# Patient Record
Sex: FEMALE | Race: WHITE | Employment: OTHER | URBAN - NONMETROPOLITAN AREA
[De-identification: names, ages, dates, MRNs, and addresses within clinical notes are randomized per-mention and may not be internally consistent; named-entity substitution may affect disease eponyms.]

---

## 2019-04-15 ENCOUNTER — OFFICE VISIT (OUTPATIENT)
Dept: PRIMARY CARE CLINIC | Age: 74
End: 2019-04-15
Payer: MEDICARE

## 2019-04-15 VITALS
WEIGHT: 141 LBS | HEART RATE: 82 BPM | BODY MASS INDEX: 25.95 KG/M2 | HEIGHT: 62 IN | OXYGEN SATURATION: 96 % | DIASTOLIC BLOOD PRESSURE: 80 MMHG | SYSTOLIC BLOOD PRESSURE: 126 MMHG

## 2019-04-15 DIAGNOSIS — Z86.14 HISTORY OF MRSA INFECTION: ICD-10-CM

## 2019-04-15 DIAGNOSIS — E78.2 MIXED HYPERLIPIDEMIA: ICD-10-CM

## 2019-04-15 DIAGNOSIS — E03.9 HYPOTHYROIDISM, UNSPECIFIED TYPE: ICD-10-CM

## 2019-04-15 DIAGNOSIS — C91.10 CLL (CHRONIC LYMPHOCYTIC LEUKEMIA) (HCC): ICD-10-CM

## 2019-04-15 DIAGNOSIS — I10 ESSENTIAL HYPERTENSION: Primary | ICD-10-CM

## 2019-04-15 PROCEDURE — 4040F PNEUMOC VAC/ADMIN/RCVD: CPT | Performed by: NURSE PRACTITIONER

## 2019-04-15 PROCEDURE — 99203 OFFICE O/P NEW LOW 30 MIN: CPT | Performed by: NURSE PRACTITIONER

## 2019-04-15 PROCEDURE — 1123F ACP DISCUSS/DSCN MKR DOCD: CPT | Performed by: NURSE PRACTITIONER

## 2019-04-15 PROCEDURE — G8427 DOCREV CUR MEDS BY ELIG CLIN: HCPCS | Performed by: NURSE PRACTITIONER

## 2019-04-15 PROCEDURE — 3017F COLORECTAL CA SCREEN DOC REV: CPT | Performed by: NURSE PRACTITIONER

## 2019-04-15 PROCEDURE — G8400 PT W/DXA NO RESULTS DOC: HCPCS | Performed by: NURSE PRACTITIONER

## 2019-04-15 PROCEDURE — 1036F TOBACCO NON-USER: CPT | Performed by: NURSE PRACTITIONER

## 2019-04-15 PROCEDURE — G8419 CALC BMI OUT NRM PARAM NOF/U: HCPCS | Performed by: NURSE PRACTITIONER

## 2019-04-15 PROCEDURE — 1090F PRES/ABSN URINE INCON ASSESS: CPT | Performed by: NURSE PRACTITIONER

## 2019-04-15 RX ORDER — LEVOTHYROXINE SODIUM 88 UG/1
88 TABLET ORAL DAILY
Qty: 90 TABLET | Refills: 3 | Status: SHIPPED | OUTPATIENT
Start: 2019-04-15 | End: 2020-04-28 | Stop reason: SDUPTHER

## 2019-04-15 RX ORDER — LEVOTHYROXINE SODIUM 88 UG/1
88 TABLET ORAL DAILY
COMMUNITY
End: 2019-04-15 | Stop reason: SDUPTHER

## 2019-04-15 RX ORDER — ROSUVASTATIN CALCIUM 5 MG/1
5 TABLET, COATED ORAL DAILY
COMMUNITY
End: 2019-04-15 | Stop reason: SDUPTHER

## 2019-04-15 RX ORDER — ROSUVASTATIN CALCIUM 5 MG/1
5 TABLET, COATED ORAL DAILY
Qty: 90 TABLET | Refills: 2 | Status: SHIPPED | OUTPATIENT
Start: 2019-04-15 | End: 2020-04-09 | Stop reason: SDUPTHER

## 2019-04-15 RX ORDER — RAMIPRIL 5 MG/1
5 CAPSULE ORAL DAILY
COMMUNITY
End: 2019-06-03 | Stop reason: SDUPTHER

## 2019-04-15 SDOH — HEALTH STABILITY: MENTAL HEALTH: HOW OFTEN DO YOU HAVE A DRINK CONTAINING ALCOHOL?: 2-4 TIMES A MONTH

## 2019-04-15 SDOH — HEALTH STABILITY: MENTAL HEALTH: HOW MANY STANDARD DRINKS CONTAINING ALCOHOL DO YOU HAVE ON A TYPICAL DAY?: 1 OR 2

## 2019-04-15 ASSESSMENT — PATIENT HEALTH QUESTIONNAIRE - PHQ9
2. FEELING DOWN, DEPRESSED OR HOPELESS: 0
1. LITTLE INTEREST OR PLEASURE IN DOING THINGS: 0
SUM OF ALL RESPONSES TO PHQ QUESTIONS 1-9: 0
SUM OF ALL RESPONSES TO PHQ9 QUESTIONS 1 & 2: 0
SUM OF ALL RESPONSES TO PHQ QUESTIONS 1-9: 0

## 2019-04-15 ASSESSMENT — ENCOUNTER SYMPTOMS
BACK PAIN: 1
SHORTNESS OF BREATH: 0
NAUSEA: 0
DIARRHEA: 0
SORE THROAT: 0
CONSTIPATION: 0
ABDOMINAL PAIN: 0
PHOTOPHOBIA: 0
COUGH: 0
VOMITING: 0

## 2019-04-15 NOTE — PROGRESS NOTES
[address]  PRIMARY CARE  [address] [facility] [address]  Dept: [phone]    Mara Titus is a 74 y.o. female who presents today as an new patient for her medical conditionsas noted below. Chief Complaint   Patient presents with    New Patient     New to area from [place], now resides in the [facility] here at [facility]. Here to get established as a new patient. HPI:     HPI  No concerns today  Back pain related to recent move   A lot of exercise, tia chi, golf, and walking  CLL - father had it also, oncologist in [place], [place]   History of MRSA like boil on nose and moved to other parts of visit. She saw an ENT and was treated continues mupirocin. Last colonoscopy was completed about 3-4 years ago  Pneumonia vaccine upto date  No shingles vaccine due to CLL      No results found for: LDLCHOLESTEROL, LDLCALC    (goal LDL is <100)   No results found for: AST, ALT, BUN  BP Readings from Last 3 Encounters:   04/15/19 126/80          (goal 120/80)    Past Medical History:   Diagnosis Date    CLL (chronic lymphocytic leukemia) ([facility] [address] 2004    Hyperlipidemia     Hypothyroidism     MRSA (methicillin resistant Staphylococcus aureus) infection       Past Surgical History:   Procedure Laterality Date    BREAST BIOPSY Right        Family History   Problem Relation Age of Onset    Dementia Mother     Other Father         CLL       Social History     Tobacco Use    Smoking status: Never Smoker    Smokeless tobacco: Never Used   Substance Use Topics    Alcohol use: Yes     Frequency: 2-4 times a month     Drinks per session: 1 or 2     Binge frequency: Never      Current Outpatient Medications   Medication Sig Dispense Refill    ramipril (ALTACE) 5 MG capsule Take 5 mg by mouth daily      mupirocin (BACTROBAN) 2 % ointment Apply 3 times daily.  1 Tube 3    rosuvastatin (CRESTOR) 5 MG tablet Take 1 tablet by mouth daily 90 tablet well-developed and well-nourished. HENT:   Head: Normocephalic and atraumatic. Right Ear: External ear normal.   Left Ear: External ear normal.   Eyes: Pupils are equal, round, and reactive to light. Conjunctivae and EOM are normal.   Neck: Normal range of motion. Neck supple. No thyromegaly present. Cardiovascular: Normal rate, regular rhythm and normal heart sounds. No carotid bruit   Pulmonary/Chest: Effort normal and breath sounds normal.   Abdominal: Soft. Bowel sounds are normal.   Musculoskeletal: Normal range of motion. She exhibits no edema. Lymphadenopathy:     She has no cervical adenopathy. Neurological: She is alert and oriented to person, place, and time. No cranial nerve deficit. Skin: Skin is warm and dry. Capillary refill takes less than 2 seconds. Psychiatric: She has a normal mood and affect. Her behavior is normal. Thought content normal.   Nursing note and vitals reviewed. Assessment:       Diagnosis Orders   1. Essential hypertension     2. Mixed hyperlipidemia  rosuvastatin (CRESTOR) 5 MG tablet   3. Hypothyroidism, unspecified type  levothyroxine (SYNTHROID) 88 MCG tablet   4. CLL (chronic lymphocytic leukemia) [facility])  Jennifer Kelley MD, Hematology/Oncology, [place]   5. History of MRSA infection  mupirocin (BACTROBAN) 2 % ointment        Plan:    Referral to Dr. Yves Sinha  Refill medications  Obtain previous records    Return in about 6 months (around 10/15/2019) for med check. Orders Placed This Encounter   Procedures   Jennifer Kelley MD, Hematology/Oncology, [place]     Referral Priority:   Routine     Referral Type:   Eval and Treat     Referral Reason:   Specialty Services Required     Referred to Provider:   Disha Cabello MD     Requested Specialty:   Hematology and Oncology     Number of Visits Requested:   1     Orders Placed This Encounter   Medications    mupirocin (BACTROBAN) 2 % ointment     Sig: Apply 3 times daily. Dispense:  1 Tube     Refill:  3    rosuvastatin (CRESTOR) 5 MG tablet     Sig: Take 1 tablet by mouth daily     Dispense:  90 tablet     Refill:  2    levothyroxine (SYNTHROID) 88 MCG tablet     Sig: Take 1 tablet by mouth Daily     Dispense:  90 tablet     Refill:  3       Patient given educationalmaterials - see patient instructions. Discussed use, benefit, and side effectsof prescribed medications. All patient questions answered. Pt voiced understanding. Reviewed health maintenance. Instructed to continue current medications, diet andexercise. Patient agreed with treatment plan. Follow up as directed.      Electronicallysigned by KIRA Gonzalez CNP on 4/15/2019 at 9:56 PM

## 2019-04-23 ENCOUNTER — OFFICE VISIT (OUTPATIENT)
Dept: PRIMARY CARE CLINIC | Age: 74
End: 2019-04-23
Payer: MEDICARE

## 2019-04-23 VITALS
BODY MASS INDEX: 26.02 KG/M2 | SYSTOLIC BLOOD PRESSURE: 146 MMHG | WEIGHT: 141.4 LBS | RESPIRATION RATE: 16 BRPM | OXYGEN SATURATION: 98 % | HEIGHT: 62 IN | HEART RATE: 81 BPM | DIASTOLIC BLOOD PRESSURE: 90 MMHG

## 2019-04-23 DIAGNOSIS — M62.838 MUSCLE SPASM: ICD-10-CM

## 2019-04-23 DIAGNOSIS — M54.6 ACUTE BILATERAL THORACIC BACK PAIN: Primary | ICD-10-CM

## 2019-04-23 PROCEDURE — G8400 PT W/DXA NO RESULTS DOC: HCPCS | Performed by: NURSE PRACTITIONER

## 2019-04-23 PROCEDURE — 1090F PRES/ABSN URINE INCON ASSESS: CPT | Performed by: NURSE PRACTITIONER

## 2019-04-23 PROCEDURE — 3017F COLORECTAL CA SCREEN DOC REV: CPT | Performed by: NURSE PRACTITIONER

## 2019-04-23 PROCEDURE — G8419 CALC BMI OUT NRM PARAM NOF/U: HCPCS | Performed by: NURSE PRACTITIONER

## 2019-04-23 PROCEDURE — G8427 DOCREV CUR MEDS BY ELIG CLIN: HCPCS | Performed by: NURSE PRACTITIONER

## 2019-04-23 PROCEDURE — 99213 OFFICE O/P EST LOW 20 MIN: CPT | Performed by: NURSE PRACTITIONER

## 2019-04-23 PROCEDURE — 1036F TOBACCO NON-USER: CPT | Performed by: NURSE PRACTITIONER

## 2019-04-23 PROCEDURE — 4040F PNEUMOC VAC/ADMIN/RCVD: CPT | Performed by: NURSE PRACTITIONER

## 2019-04-23 PROCEDURE — 1123F ACP DISCUSS/DSCN MKR DOCD: CPT | Performed by: NURSE PRACTITIONER

## 2019-04-23 ASSESSMENT — ENCOUNTER SYMPTOMS
COUGH: 0
NAUSEA: 0
DIARRHEA: 0
BACK PAIN: 1
CONSTIPATION: 0
SHORTNESS OF BREATH: 0

## 2019-04-23 NOTE — PROGRESS NOTES
is oriented to person, place, and time. She appears well-developed and well-nourished. HENT:   Head: Normocephalic and atraumatic. Neck: Normal range of motion. Neck supple. Cardiovascular: Normal rate, regular rhythm and normal heart sounds. No murmur heard. No carotid bruit   Pulmonary/Chest: Effort normal and breath sounds normal.   Abdominal: Soft. Bowel sounds are normal.   Musculoskeletal: Normal range of motion. She exhibits no edema. Thoracic back: She exhibits pain. She exhibits normal range of motion, no tenderness, no bony tenderness and no spasm. Lumbar back: She exhibits normal range of motion, no tenderness, no bony tenderness and no pain. Neurological: She is alert and oriented to person, place, and time. Skin: Skin is warm and dry. Psychiatric: She has a normal mood and affect. Her behavior is normal.   Nursing note and vitals reviewed. Assessment:       Diagnosis Orders   1. Acute bilateral thoracic back pain  External Referral To Physical Therapy   2. Muscle spasm  External Referral To Physical Therapy        Plan:     Apply heat and ice to effective area. First apply heat to effective area for 15-20 minutes then immediately afterward apply ice for 15-20 minutes 2-3x per day. Physical therapy    Blood pressure elevated today - may be due to pain and coffee. Recheck in one week. Return if symptoms worsen or fail to improve, for nurse visit 1 week for HTN. Orders Placed This Encounter   Procedures    External Referral To Physical Therapy     Referral Priority:   Routine     Referral Type:   Eval and Treat     Referral Reason:   Specialty Services Required     Requested Specialty:   Physical Therapy     Number of Visits Requested:   1     No orders of the defined types were placed in this encounter. Patient given educationalmaterials - see patient instructions. Discussed use, benefit, and side effectsof prescribed medications.   All patient questions answered. Pt voiced understanding. Reviewed health maintenance. Instructed to continue current medications, diet andexercise. Patient agreed with treatment plan. Follow up as directed.      Electronicallysigned by KIRA Darling CNP on 4/23/2019 at 10:38 AM

## 2019-04-23 NOTE — PATIENT INSTRUCTIONS
Apply heat and ice to effective area. First apply heat to effective area for 15-20 minutes then immediately afterward apply ice for 15-20 minutes 2-3x per day.      Physical therapy

## 2019-04-24 ENCOUNTER — TELEPHONE (OUTPATIENT)
Dept: ONCOLOGY | Age: 74
End: 2019-04-24

## 2019-04-24 ENCOUNTER — OFFICE VISIT (OUTPATIENT)
Dept: ONCOLOGY | Age: 74
End: 2019-04-24
Payer: MEDICARE

## 2019-04-24 ENCOUNTER — HOSPITAL ENCOUNTER (OUTPATIENT)
Age: 74
Discharge: HOME | End: 2019-04-24
Payer: MEDICARE

## 2019-04-24 VITALS
DIASTOLIC BLOOD PRESSURE: 76 MMHG | HEART RATE: 80 BPM | BODY MASS INDEX: 26.25 KG/M2 | TEMPERATURE: 98.2 F | SYSTOLIC BLOOD PRESSURE: 135 MMHG | WEIGHT: 141.2 LBS

## 2019-04-24 DIAGNOSIS — C91.10 CLL (CHRONIC LYMPHOCYTIC LEUKEMIA) (HCC): ICD-10-CM

## 2019-04-24 DIAGNOSIS — E03.9 HYPOTHYROIDISM, UNSPECIFIED TYPE: ICD-10-CM

## 2019-04-24 DIAGNOSIS — Z86.14 HISTORY OF MRSA INFECTION: ICD-10-CM

## 2019-04-24 DIAGNOSIS — E78.2 MIXED HYPERLIPIDEMIA: ICD-10-CM

## 2019-04-24 DIAGNOSIS — C91.10 CLL (CHRONIC LYMPHOCYTIC LEUKEMIA) (HCC): Primary | ICD-10-CM

## 2019-04-24 DIAGNOSIS — I10 ESSENTIAL HYPERTENSION: ICD-10-CM

## 2019-04-24 LAB
ABSOLUTE EOS #: 0 K/UL (ref 0–0.4)
ABSOLUTE IMMATURE GRANULOCYTE: ABNORMAL K/UL (ref 0–0.3)
ABSOLUTE LYMPH #: 35.1 K/UL (ref 1–4.8)
ABSOLUTE MONO #: 0.78 K/UL (ref 0.1–1.2)
ALBUMIN SERPL-MCNC: 4.6 G/DL (ref 3.5–5.2)
ALBUMIN/GLOBULIN RATIO: 1.8 (ref 1–2.5)
ALP BLD-CCNC: 89 U/L (ref 35–104)
ALT SERPL-CCNC: 11 U/L (ref 5–33)
ANION GAP SERPL CALCULATED.3IONS-SCNC: 13 MMOL/L (ref 9–17)
AST SERPL-CCNC: 26 U/L
BASOPHILS # BLD: 0 % (ref 0–2)
BASOPHILS ABSOLUTE: 0 K/UL (ref 0–0.2)
BILIRUB SERPL-MCNC: 0.52 MG/DL (ref 0.3–1.2)
BUN BLDV-MCNC: 17 MG/DL (ref 8–23)
BUN/CREAT BLD: ABNORMAL (ref 9–20)
C-REACTIVE PROTEIN: 1.4 MG/L (ref 0–5)
CALCIUM SERPL-MCNC: 9.6 MG/DL (ref 8.6–10.4)
CHLORIDE BLD-SCNC: 103 MMOL/L (ref 98–107)
CO2: 26 MMOL/L (ref 20–31)
CREAT SERPL-MCNC: 0.8 MG/DL (ref 0.5–0.9)
DIFFERENTIAL TYPE: ABNORMAL
EOSINOPHILS RELATIVE PERCENT: 0 % (ref 1–4)
FREE KAPPA/LAMBDA RATIO: 4.06 (ref 0.26–1.65)
GFR AFRICAN AMERICAN: >60 ML/MIN
GFR NON-AFRICAN AMERICAN: >60 ML/MIN
GFR SERPL CREATININE-BSD FRML MDRD: ABNORMAL ML/MIN/{1.73_M2}
GFR SERPL CREATININE-BSD FRML MDRD: ABNORMAL ML/MIN/{1.73_M2}
GLUCOSE BLD-MCNC: 142 MG/DL (ref 70–99)
HCT VFR BLD CALC: 39.4 % (ref 36–46)
HEMOGLOBIN: 13.2 G/DL (ref 12–16)
IGA: ABNORMAL MG/DL (ref 70–400)
IGG: 521 MG/DL (ref 700–1600)
IGM: <25 MG/DL (ref 40–230)
IMMATURE GRANULOCYTES: ABNORMAL %
KAPPA FREE LIGHT CHAINS QNT: 3.65 MG/DL (ref 0.37–1.94)
LACTATE DEHYDROGENASE: 229 U/L (ref 135–214)
LAMBDA FREE LIGHT CHAINS QNT: 0.9 MG/DL (ref 0.57–2.63)
LYMPHOCYTES # BLD: 90 % (ref 24–44)
MCH RBC QN AUTO: 33.2 PG (ref 26–34)
MCHC RBC AUTO-ENTMCNC: 33.4 G/DL (ref 31–37)
MCV RBC AUTO: 99.5 FL (ref 80–100)
MONOCYTES # BLD: 2 % (ref 2–11)
MORPHOLOGY: ABNORMAL
NRBC AUTOMATED: ABNORMAL PER 100 WBC
PDW BLD-RTO: 14.2 % (ref 12.5–15.4)
PLATELET # BLD: 153 K/UL (ref 140–450)
PLATELET ESTIMATE: ABNORMAL
PMV BLD AUTO: 6.9 FL (ref 6–12)
POTASSIUM SERPL-SCNC: 4.7 MMOL/L (ref 3.7–5.3)
RBC # BLD: 3.96 M/UL (ref 4–5.2)
RBC # BLD: ABNORMAL 10*6/UL
SEDIMENTATION RATE, ERYTHROCYTE: 10 MM (ref 0–20)
SEG NEUTROPHILS: 8 % (ref 36–66)
SEGMENTED NEUTROPHILS ABSOLUTE COUNT: 3.12 K/UL (ref 1.8–7.7)
SODIUM BLD-SCNC: 142 MMOL/L (ref 135–144)
TOTAL PROTEIN: 7.1 G/DL (ref 6.4–8.3)
WBC # BLD: 39 K/UL (ref 3.5–11)
WBC # BLD: ABNORMAL 10*3/UL

## 2019-04-24 PROCEDURE — 36415 COLL VENOUS BLD VENIPUNCTURE: CPT

## 2019-04-24 PROCEDURE — G8427 DOCREV CUR MEDS BY ELIG CLIN: HCPCS | Performed by: INTERNAL MEDICINE

## 2019-04-24 PROCEDURE — 85025 COMPLETE CBC W/AUTO DIFF WBC: CPT

## 2019-04-24 PROCEDURE — 1123F ACP DISCUSS/DSCN MKR DOCD: CPT | Performed by: INTERNAL MEDICINE

## 2019-04-24 PROCEDURE — 85651 RBC SED RATE NONAUTOMATED: CPT

## 2019-04-24 PROCEDURE — 80053 COMPREHEN METABOLIC PANEL: CPT

## 2019-04-24 PROCEDURE — 1090F PRES/ABSN URINE INCON ASSESS: CPT | Performed by: INTERNAL MEDICINE

## 2019-04-24 PROCEDURE — 99204 OFFICE O/P NEW MOD 45 MIN: CPT | Performed by: INTERNAL MEDICINE

## 2019-04-24 PROCEDURE — 83883 ASSAY NEPHELOMETRY NOT SPEC: CPT

## 2019-04-24 PROCEDURE — 83615 LACTATE (LD) (LDH) ENZYME: CPT

## 2019-04-24 PROCEDURE — 82232 ASSAY OF BETA-2 PROTEIN: CPT

## 2019-04-24 PROCEDURE — 1036F TOBACCO NON-USER: CPT | Performed by: INTERNAL MEDICINE

## 2019-04-24 PROCEDURE — G8400 PT W/DXA NO RESULTS DOC: HCPCS | Performed by: INTERNAL MEDICINE

## 2019-04-24 PROCEDURE — 3017F COLORECTAL CA SCREEN DOC REV: CPT | Performed by: INTERNAL MEDICINE

## 2019-04-24 PROCEDURE — G8419 CALC BMI OUT NRM PARAM NOF/U: HCPCS | Performed by: INTERNAL MEDICINE

## 2019-04-24 PROCEDURE — 86140 C-REACTIVE PROTEIN: CPT

## 2019-04-24 PROCEDURE — 86334 IMMUNOFIX E-PHORESIS SERUM: CPT

## 2019-04-24 PROCEDURE — 99201 HC NEW PT, E/M LEVEL 1: CPT | Performed by: INTERNAL MEDICINE

## 2019-04-24 PROCEDURE — 84155 ASSAY OF PROTEIN SERUM: CPT

## 2019-04-24 PROCEDURE — 82784 ASSAY IGA/IGD/IGG/IGM EACH: CPT

## 2019-04-24 PROCEDURE — 84165 PROTEIN E-PHORESIS SERUM: CPT

## 2019-04-24 PROCEDURE — 4040F PNEUMOC VAC/ADMIN/RCVD: CPT | Performed by: INTERNAL MEDICINE

## 2019-04-24 NOTE — TELEPHONE ENCOUNTER
Per Dr Giovany Gunn AVS-pt to have labs before her return visit in July to see him-pt instructed to come to  a week before her rv to get labs-pt understood

## 2019-04-24 NOTE — PROGRESS NOTES
ONCOLOGY NOTE    PCP: KIRA Gonzalez - CNP  Referring Provider: KIRA Corbett -*    VISIT DIAGNOSIS:  The primary encounter diagnosis was CLL (chronic lymphocytic leukemia) ([facility] Diagnoses of Mixed hyperlipidemia, Hypothyroidism, unspecified type, Essential hypertension, and History of MRSA infection were also pertinent to this visit. STAGING:  Cancer Staging  No matching staging information was found for the patient. Patient Active Problem List    Diagnosis Date Noted    Mixed hyperlipidemia 04/15/2019    Hypothyroidism 04/15/2019    Essential hypertension 04/15/2019    CLL (chronic lymphocytic leukemia) ([facility] 04/15/2019    History of MRSA infection 04/15/2019       SUBJECTIVE/HPI:  Patt Egan is a very pleasant 74 y.o. female who is presenting for consultation at the request of her PCP. She was diagnosed with CLL/SLL around September 2004. She was following with a medical oncologist in [place], [place]. She has been living there for many years. Recently, she moved to [facility]. Because of her relocation, she was referred to me to establish care with a new oncologist.  Up to this point, her cancer has been treated with observational therapy alone. Today, she reports feeling extremely well. She's not reporting any fevers, chills or symptoms of infection. She does have a history of MRSA infection in the sinuses. However, this was adequately treated and resolved. She denies any drenching sweats or B symptoms. Her appetite and weight are stable. Her ECOG performance status is 0. She is a very good quality of life. She reports no worrisome symptoms such as pain, bruising or bleeding, lethargy or failure to thrive. She would like to hear my recommendations for ongoing surveillance of her cancer.     PAST MEDICAL HISTORY:      Diagnosis Date    CLL (chronic lymphocytic leukemia) ([facility] 2004    Hyperlipidemia     Hypothyroidism     MRSA (methicillin resistant Staphylococcus aureus) infection        PAST SURGICAL HISTORY:      Procedure Laterality Date    BREAST BIOPSY Right        CURRENT MEDICATIONS:   Current Outpatient Medications   Medication Sig Dispense Refill    ramipril (ALTACE) 5 MG capsule Take 5 mg by mouth daily      rosuvastatin (CRESTOR) 5 MG tablet Take 1 tablet by mouth daily 90 tablet 2    levothyroxine (SYNTHROID) 88 MCG tablet Take 1 tablet by mouth Daily 90 tablet 3     No current facility-administered medications for this visit. ALLERGIES:   Allergies   Allergen Reactions    Penicillins Hives, Itching and Swelling    Epinephrine Other (See Comments)     Can tolerate lower doses, but if higher dose: will cause high heart rate. FAMILY HISTORY:       Problem Relation Age of Onset    Dementia Mother     Other Father         CLL       SOCIAL HISTORY:  Social History     Tobacco Use    Smoking status: Never Smoker    Smokeless tobacco: Never Used   Substance Use Topics    Alcohol use: Yes     Frequency: 2-4 times a month     Drinks per session: 1 or 2     Binge frequency: Never    Drug use: Never       REVIEW OF SYSTEMS:   Review of Systems   All other systems reviewed and are negative. PHYSICAL EXAM:   Vitals:    04/24/19 1436   BP: 135/76   Pulse: 80   Temp: 98.2 °F (36.8 °C)        Physical Exam   Constitutional: She is oriented to person, place, and time. She appears well-developed and well-nourished. No distress. HENT:   Head: Normocephalic and atraumatic. Eyes: Pupils are equal, round, and reactive to light. Neck: Neck supple. Cardiovascular: Normal rate, regular rhythm and normal heart sounds. No murmur heard. Pulmonary/Chest: Effort normal and breath sounds normal. No stridor. No respiratory distress. She has no wheezes. Abdominal: Soft. Bowel sounds are normal. She exhibits no distension. There is no tenderness. Musculoskeletal: Normal range of motion. She exhibits no edema.    Neurological: She is alert and oriented to person, place, and time. No cranial nerve deficit. Skin: Skin is warm and dry. No rash noted. Psychiatric: She has a normal mood and affect. Her behavior is normal.   Nursing note and vitals reviewed. LABS:   No results found for this or any previous visit. IMPRESSION:     1. CLL (chronic lymphocytic leukemia) ([facility] [address]    2. Mixed hyperlipidemia    3. Hypothyroidism, unspecified type    4. Essential hypertension    5. History of MRSA infection        Patient Active Problem List   Diagnosis    Mixed hyperlipidemia    Hypothyroidism    Essential hypertension    CLL [facility]ch[facility] lymphocytic leukemia) ([facility])    History of MRSA infection       PLAN:     1. I met with the patient today for approximately 45 minutes or so face-to-face. We discussed her diagnosis, the natural history disease, the workup and treatment carried out of this point and recommendations for additional workup and management moving forward. The patient has numerous good questions. Abdomen best to answer these to her satisfaction. She was grateful for the time spent with her. 2. She was diagnosed with CLL/SLL around September 2004 by peripheral blood flow cytometry. She had asymptomatic leukocytosis with lymphocytosis. Over the course of the last 15 years or so, her white count and absolute lymphocyte count have been steadily climbing. However, she is asymptomatic. She's not reporting any bulky lymph nodes. She's not having any lethargy or failure to thrive. She has no drenching sweats or B symptoms. She has no evidence of end organ damage. All taken in the context, she doesn't really meet any criteria to recommend proceeding with systemic therapy. 3. In lieu of the above, I would advise continuation with observation and watchful waiting. Patient is highly agreeable with this plan. 4. We will check labs today to establish a baseline. Our plan is to recheck a CBC in 3 months to assess for stability. Patient is in agreement with this plan. 5. She'll return to see me in about 3 months or sooner if clinically indicated.       Electronically signed by Kristine Terry MD on 4/24/2019 at 3:05 PM

## 2019-04-25 LAB
BETA-2 MICROGLOBULIN: 3.1 MG/L (ref 0.6–2.4)
IGA, LOW RANGE: 34 MG/DL (ref 70–400)

## 2019-04-26 LAB
ALBUMIN (CALCULATED): 4.4 G/DL (ref 3.2–5.2)
ALBUMIN PERCENT: 70 % (ref 45–65)
ALPHA 1 PERCENT: 3 % (ref 3–6)
ALPHA 2 PERCENT: 11 % (ref 6–13)
ALPHA-1-GLOBULIN: 0.2 G/DL (ref 0.1–0.4)
ALPHA-2-GLOBULIN: 0.7 G/DL (ref 0.5–0.9)
BETA GLOBULIN: 0.6 G/DL (ref 0.5–1.1)
BETA PERCENT: 10 % (ref 11–19)
GAMMA GLOBULIN %: 6 % (ref 9–20)
GAMMA GLOBULIN: 0.4 G/DL (ref 0.5–1.5)
PATHOLOGIST: ABNORMAL
PATHOLOGIST: NORMAL
PROTEIN ELECTROPHORESIS, SERUM: ABNORMAL
SERUM IFX INTERP: NORMAL
TOTAL PROT. SUM,%: 100 % (ref 98–102)
TOTAL PROT. SUM: 6.3 G/DL (ref 6.3–8.2)
TOTAL PROTEIN: 6.3 G/DL (ref 6.4–8.3)

## 2019-04-30 ENCOUNTER — NURSE ONLY (OUTPATIENT)
Dept: PRIMARY CARE CLINIC | Age: 74
End: 2019-04-30

## 2019-04-30 VITALS
OXYGEN SATURATION: 95 % | WEIGHT: 141.4 LBS | HEIGHT: 62 IN | SYSTOLIC BLOOD PRESSURE: 130 MMHG | RESPIRATION RATE: 16 BRPM | DIASTOLIC BLOOD PRESSURE: 78 MMHG | BODY MASS INDEX: 26.02 KG/M2 | HEART RATE: 67 BPM

## 2019-04-30 DIAGNOSIS — I10 ESSENTIAL HYPERTENSION: Primary | ICD-10-CM

## 2019-04-30 NOTE — PROGRESS NOTES
Patient is here for a 1 week blood pressure check. She denies any headaches, light-headedness, dizziness, chest pain or SOB. She is currently taking Ramipril 5 mg daily for her blood pressure. After discussed with Zach Rogers, he said that her blood pressure is good and to have a HTN follow up in 3 months. Appointment was scheduled.

## 2019-06-03 RX ORDER — RAMIPRIL 5 MG/1
5 CAPSULE ORAL DAILY
Qty: 90 CAPSULE | Refills: 3 | Status: SHIPPED | OUTPATIENT
Start: 2019-06-03 | End: 2020-06-03 | Stop reason: SDUPTHER

## 2019-06-03 RX ORDER — RAMIPRIL 1.25 MG/1
1.25 CAPSULE ORAL DAILY
Qty: 90 CAPSULE | Refills: 3 | OUTPATIENT
Start: 2019-06-03

## 2019-06-03 NOTE — TELEPHONE ENCOUNTER
Need Ramipril 5mg normally given a 90 day supply.  Need to order prescription again, needs to be sent to [facility] at [facility]

## 2019-07-09 ENCOUNTER — HOSPITAL ENCOUNTER (OUTPATIENT)
Age: 74
Discharge: HOME | End: 2019-07-09
Payer: MEDICARE

## 2019-07-09 DIAGNOSIS — C91.10 CLL (CHRONIC LYMPHOCYTIC LEUKEMIA) (HCC): ICD-10-CM

## 2019-07-09 LAB
ABSOLUTE EOS #: 0 K/UL (ref 0–0.4)
ABSOLUTE IMMATURE GRANULOCYTE: ABNORMAL K/UL (ref 0–0.3)
ABSOLUTE LYMPH #: 47.12 K/UL (ref 1–4.8)
ABSOLUTE MONO #: 0.5 K/UL (ref 0.1–0.8)
ALBUMIN SERPL-MCNC: 4.5 G/DL (ref 3.5–5.2)
ALBUMIN/GLOBULIN RATIO: 2.3 (ref 1–2.5)
ALP BLD-CCNC: 65 U/L (ref 35–104)
ALT SERPL-CCNC: 10 U/L (ref 5–33)
ANION GAP SERPL CALCULATED.3IONS-SCNC: 9 MMOL/L (ref 9–17)
AST SERPL-CCNC: 24 U/L
BASOPHILS # BLD: 0 % (ref 0–2)
BASOPHILS ABSOLUTE: 0 K/UL (ref 0–0.2)
BILIRUB SERPL-MCNC: 0.55 MG/DL (ref 0.3–1.2)
BUN BLDV-MCNC: 19 MG/DL (ref 8–23)
BUN/CREAT BLD: ABNORMAL (ref 9–20)
CALCIUM SERPL-MCNC: 9.9 MG/DL (ref 8.6–10.4)
CHLORIDE BLD-SCNC: 104 MMOL/L (ref 98–107)
CO2: 29 MMOL/L (ref 20–31)
CREAT SERPL-MCNC: 0.84 MG/DL (ref 0.5–0.9)
DIFFERENTIAL TYPE: ABNORMAL
EOSINOPHILS RELATIVE PERCENT: 0 % (ref 1–4)
GFR AFRICAN AMERICAN: >60 ML/MIN
GFR NON-AFRICAN AMERICAN: >60 ML/MIN
GFR SERPL CREATININE-BSD FRML MDRD: ABNORMAL ML/MIN/{1.73_M2}
GFR SERPL CREATININE-BSD FRML MDRD: ABNORMAL ML/MIN/{1.73_M2}
GLUCOSE BLD-MCNC: 74 MG/DL (ref 70–99)
HCT VFR BLD CALC: 41.7 % (ref 36–46)
HEMOGLOBIN: 14 G/DL (ref 12–16)
IMMATURE GRANULOCYTES: ABNORMAL %
LACTATE DEHYDROGENASE: 231 U/L (ref 135–214)
LYMPHOCYTES # BLD: 95 % (ref 24–44)
MCH RBC QN AUTO: 33.8 PG (ref 26–34)
MCHC RBC AUTO-ENTMCNC: 33.5 G/DL (ref 31–37)
MCV RBC AUTO: 100.9 FL (ref 80–100)
MONOCYTES # BLD: 1 % (ref 1–7)
MORPHOLOGY: ABNORMAL
NRBC AUTOMATED: ABNORMAL PER 100 WBC
PDW BLD-RTO: 14.6 % (ref 12.5–15.4)
PLATELET # BLD: 123 K/UL (ref 140–450)
PLATELET ESTIMATE: ABNORMAL
PMV BLD AUTO: 7.2 FL (ref 6–12)
POTASSIUM SERPL-SCNC: 5.9 MMOL/L (ref 3.7–5.3)
RBC # BLD: 4.14 M/UL (ref 4–5.2)
RBC # BLD: ABNORMAL 10*6/UL
SEG NEUTROPHILS: 4 % (ref 36–66)
SEGMENTED NEUTROPHILS ABSOLUTE COUNT: 1.98 K/UL (ref 1.8–7.7)
SODIUM BLD-SCNC: 142 MMOL/L (ref 135–144)
TOTAL PROTEIN: 6.5 G/DL (ref 6.4–8.3)
WBC # BLD: 49.6 K/UL (ref 3.5–11)
WBC # BLD: ABNORMAL 10*3/UL

## 2019-07-09 PROCEDURE — 85025 COMPLETE CBC W/AUTO DIFF WBC: CPT

## 2019-07-09 PROCEDURE — 83615 LACTATE (LD) (LDH) ENZYME: CPT

## 2019-07-09 PROCEDURE — 36415 COLL VENOUS BLD VENIPUNCTURE: CPT

## 2019-07-09 PROCEDURE — 80053 COMPREHEN METABOLIC PANEL: CPT

## 2019-07-17 ENCOUNTER — OFFICE VISIT (OUTPATIENT)
Dept: ONCOLOGY | Age: 74
End: 2019-07-17
Payer: MEDICARE

## 2019-07-17 VITALS
SYSTOLIC BLOOD PRESSURE: 147 MMHG | BODY MASS INDEX: 26.02 KG/M2 | RESPIRATION RATE: 16 BRPM | TEMPERATURE: 97.7 F | HEIGHT: 62 IN | HEART RATE: 79 BPM | DIASTOLIC BLOOD PRESSURE: 82 MMHG | WEIGHT: 141.4 LBS

## 2019-07-17 DIAGNOSIS — D69.6 THROMBOCYTOPENIA (HCC): ICD-10-CM

## 2019-07-17 DIAGNOSIS — C91.10 CLL (CHRONIC LYMPHOCYTIC LEUKEMIA) (HCC): Primary | ICD-10-CM

## 2019-07-17 PROCEDURE — 1123F ACP DISCUSS/DSCN MKR DOCD: CPT | Performed by: INTERNAL MEDICINE

## 2019-07-17 PROCEDURE — 99211 OFF/OP EST MAY X REQ PHY/QHP: CPT | Performed by: INTERNAL MEDICINE

## 2019-07-17 PROCEDURE — 4040F PNEUMOC VAC/ADMIN/RCVD: CPT | Performed by: INTERNAL MEDICINE

## 2019-07-17 PROCEDURE — 1036F TOBACCO NON-USER: CPT | Performed by: INTERNAL MEDICINE

## 2019-07-17 PROCEDURE — G8427 DOCREV CUR MEDS BY ELIG CLIN: HCPCS | Performed by: INTERNAL MEDICINE

## 2019-07-17 PROCEDURE — 99214 OFFICE O/P EST MOD 30 MIN: CPT | Performed by: INTERNAL MEDICINE

## 2019-07-17 PROCEDURE — G8419 CALC BMI OUT NRM PARAM NOF/U: HCPCS | Performed by: INTERNAL MEDICINE

## 2019-07-17 PROCEDURE — 1090F PRES/ABSN URINE INCON ASSESS: CPT | Performed by: INTERNAL MEDICINE

## 2019-07-17 PROCEDURE — G8400 PT W/DXA NO RESULTS DOC: HCPCS | Performed by: INTERNAL MEDICINE

## 2019-09-25 ENCOUNTER — TELEPHONE (OUTPATIENT)
Dept: PRIMARY CARE CLINIC | Age: 74
End: 2019-09-25

## 2019-09-25 DIAGNOSIS — C91.10 CLL (CHRONIC LYMPHOCYTIC LEUKEMIA) (HCC): Primary | ICD-10-CM

## 2019-09-25 DIAGNOSIS — E78.2 MIXED HYPERLIPIDEMIA: ICD-10-CM

## 2019-09-25 DIAGNOSIS — Z12.31 ENCOUNTER FOR SCREENING MAMMOGRAM FOR BREAST CANCER: ICD-10-CM

## 2019-09-25 DIAGNOSIS — E03.9 HYPOTHYROIDISM, UNSPECIFIED TYPE: ICD-10-CM

## 2019-09-25 DIAGNOSIS — I10 ESSENTIAL HYPERTENSION: ICD-10-CM

## 2019-09-25 NOTE — TELEPHONE ENCOUNTER
Orders entered for CBC with diff., fasting CMP, fasting lipids, and TSH with reflex. Also order for mammogram. Dr. Oliva Carrion may want additional testing. Please advise.

## 2019-10-02 ENCOUNTER — HOSPITAL ENCOUNTER (OUTPATIENT)
Age: 74
Discharge: HOME | End: 2019-10-02
Payer: MEDICARE

## 2019-10-02 DIAGNOSIS — E78.2 MIXED HYPERLIPIDEMIA: ICD-10-CM

## 2019-10-02 DIAGNOSIS — E03.9 HYPOTHYROIDISM, UNSPECIFIED TYPE: ICD-10-CM

## 2019-10-02 DIAGNOSIS — I10 ESSENTIAL HYPERTENSION: ICD-10-CM

## 2019-10-02 DIAGNOSIS — C91.10 CLL (CHRONIC LYMPHOCYTIC LEUKEMIA) (HCC): ICD-10-CM

## 2019-10-02 LAB
ABSOLUTE EOS #: 0 K/UL (ref 0–0.4)
ABSOLUTE EOS #: 0 K/UL (ref 0–0.4)
ABSOLUTE IMMATURE GRANULOCYTE: ABNORMAL K/UL (ref 0–0.3)
ABSOLUTE IMMATURE GRANULOCYTE: ABNORMAL K/UL (ref 0–0.3)
ABSOLUTE LYMPH #: 38.22 K/UL (ref 1–4.8)
ABSOLUTE LYMPH #: 38.22 K/UL (ref 1–4.8)
ABSOLUTE MONO #: 0.82 K/UL (ref 0.1–1.2)
ABSOLUTE MONO #: 0.82 K/UL (ref 0.1–1.2)
ABSOLUTE RETIC #: 0.06 M/UL (ref 0.03–0.08)
ALBUMIN SERPL-MCNC: 4.8 G/DL (ref 3.5–5.2)
ALBUMIN/GLOBULIN RATIO: 2.8 (ref 1–2.5)
ALP BLD-CCNC: 70 U/L (ref 35–104)
ALT SERPL-CCNC: 12 U/L (ref 5–33)
ANION GAP SERPL CALCULATED.3IONS-SCNC: 8 MMOL/L (ref 9–17)
AST SERPL-CCNC: 29 U/L
BASOPHILS # BLD: 0 % (ref 0–2)
BASOPHILS # BLD: 0 % (ref 0–2)
BASOPHILS ABSOLUTE: 0 K/UL (ref 0–0.2)
BASOPHILS ABSOLUTE: 0 K/UL (ref 0–0.2)
BILIRUB SERPL-MCNC: 0.81 MG/DL (ref 0.3–1.2)
BUN BLDV-MCNC: 15 MG/DL (ref 8–23)
BUN/CREAT BLD: ABNORMAL (ref 9–20)
CALCIUM SERPL-MCNC: 9.7 MG/DL (ref 8.6–10.4)
CHLORIDE BLD-SCNC: 106 MMOL/L (ref 98–107)
CHOLESTEROL, FASTING: 180 MG/DL
CHOLESTEROL/HDL RATIO: 3
CO2: 29 MMOL/L (ref 20–31)
CREAT SERPL-MCNC: 0.76 MG/DL (ref 0.5–0.9)
DIFFERENTIAL TYPE: ABNORMAL
DIFFERENTIAL TYPE: ABNORMAL
EOSINOPHILS RELATIVE PERCENT: 0 % (ref 1–4)
EOSINOPHILS RELATIVE PERCENT: 0 % (ref 1–4)
FERRITIN: 82 UG/L (ref 13–150)
FOLATE: 15.2 NG/ML
FREE KAPPA/LAMBDA RATIO: 6.68 (ref 0.26–1.65)
GFR AFRICAN AMERICAN: >60 ML/MIN
GFR NON-AFRICAN AMERICAN: >60 ML/MIN
GFR SERPL CREATININE-BSD FRML MDRD: ABNORMAL ML/MIN/{1.73_M2}
GFR SERPL CREATININE-BSD FRML MDRD: ABNORMAL ML/MIN/{1.73_M2}
GLUCOSE FASTING: 107 MG/DL (ref 70–99)
HAPTOGLOBIN: 106 MG/DL (ref 30–200)
HCT VFR BLD CALC: 41.3 % (ref 36–46)
HCT VFR BLD CALC: 41.3 % (ref 36–46)
HDLC SERPL-MCNC: 60 MG/DL
HEMOGLOBIN: 13.3 G/DL (ref 12–16)
HEMOGLOBIN: 13.3 G/DL (ref 12–16)
IGA, LOW RANGE: 26 MG/DL (ref 70–400)
IGA: ABNORMAL MG/DL (ref 70–400)
IGG: 466 MG/DL (ref 700–1600)
IGM: <25 MG/DL (ref 40–230)
IMMATURE GRANULOCYTES: ABNORMAL %
IMMATURE GRANULOCYTES: ABNORMAL %
IMMATURE RETIC FRACT: 8.9 % (ref 2.7–18.3)
IRON SATURATION: 34 % (ref 20–55)
IRON: 98 UG/DL (ref 37–145)
KAPPA FREE LIGHT CHAINS QNT: 4.01 MG/DL (ref 0.37–1.94)
LACTATE DEHYDROGENASE: 235 U/L (ref 135–214)
LAMBDA FREE LIGHT CHAINS QNT: 0.6 MG/DL (ref 0.57–2.63)
LDL CHOLESTEROL: 105 MG/DL (ref 0–130)
LYMPHOCYTES # BLD: 93 % (ref 24–44)
LYMPHOCYTES # BLD: 93 % (ref 24–44)
MCH RBC QN AUTO: 32.3 PG (ref 26–34)
MCH RBC QN AUTO: 32.3 PG (ref 26–34)
MCHC RBC AUTO-ENTMCNC: 32.3 G/DL (ref 31–37)
MCHC RBC AUTO-ENTMCNC: 32.3 G/DL (ref 31–37)
MCV RBC AUTO: 100 FL (ref 80–100)
MCV RBC AUTO: 100 FL (ref 80–100)
MONOCYTES # BLD: 2 % (ref 2–11)
MONOCYTES # BLD: 2 % (ref 2–11)
MORPHOLOGY: ABNORMAL
MORPHOLOGY: ABNORMAL
NRBC AUTOMATED: ABNORMAL PER 100 WBC
NRBC AUTOMATED: ABNORMAL PER 100 WBC
PDW BLD-RTO: 14.7 % (ref 12.5–15.4)
PDW BLD-RTO: 14.7 % (ref 12.5–15.4)
PLATELET # BLD: 115 K/UL (ref 140–450)
PLATELET # BLD: 115 K/UL (ref 140–450)
PLATELET ESTIMATE: ABNORMAL
PLATELET ESTIMATE: ABNORMAL
PMV BLD AUTO: 7.3 FL (ref 6–12)
PMV BLD AUTO: 7.3 FL (ref 6–12)
POTASSIUM SERPL-SCNC: 4.9 MMOL/L (ref 3.7–5.3)
RBC # BLD: 4.13 M/UL (ref 4–5.2)
RBC # BLD: 4.13 M/UL (ref 4–5.2)
RBC # BLD: ABNORMAL 10*6/UL
RBC # BLD: ABNORMAL 10*6/UL
RETIC %: 1.4 % (ref 0.5–1.9)
RETIC HEMOGLOBIN: 36.6 PG (ref 28.2–35.7)
SEG NEUTROPHILS: 5 % (ref 36–66)
SEG NEUTROPHILS: 5 % (ref 36–66)
SEGMENTED NEUTROPHILS ABSOLUTE COUNT: 2.06 K/UL (ref 1.8–7.7)
SEGMENTED NEUTROPHILS ABSOLUTE COUNT: 2.06 K/UL (ref 1.8–7.7)
SODIUM BLD-SCNC: 143 MMOL/L (ref 135–144)
TOTAL IRON BINDING CAPACITY: 285 UG/DL (ref 250–450)
TOTAL PROTEIN: 6.5 G/DL (ref 6.4–8.3)
TRIGLYCERIDE, FASTING: 73 MG/DL
TSH SERPL DL<=0.05 MIU/L-ACNC: 4.45 MIU/L (ref 0.3–5)
UNSATURATED IRON BINDING CAPACITY: 187 UG/DL (ref 112–347)
VITAMIN B-12: 499 PG/ML (ref 232–1245)
VLDLC SERPL CALC-MCNC: NORMAL MG/DL (ref 1–30)
WBC # BLD: 41.1 K/UL (ref 3.5–11)
WBC # BLD: 41.1 K/UL (ref 3.5–11)
WBC # BLD: ABNORMAL 10*3/UL
WBC # BLD: ABNORMAL 10*3/UL

## 2019-10-02 PROCEDURE — 83540 ASSAY OF IRON: CPT

## 2019-10-02 PROCEDURE — 84165 PROTEIN E-PHORESIS SERUM: CPT

## 2019-10-02 PROCEDURE — 82746 ASSAY OF FOLIC ACID SERUM: CPT

## 2019-10-02 PROCEDURE — 83550 IRON BINDING TEST: CPT

## 2019-10-02 PROCEDURE — 85045 AUTOMATED RETICULOCYTE COUNT: CPT

## 2019-10-02 PROCEDURE — 84155 ASSAY OF PROTEIN SERUM: CPT

## 2019-10-02 PROCEDURE — 80053 COMPREHEN METABOLIC PANEL: CPT

## 2019-10-02 PROCEDURE — 82784 ASSAY IGA/IGD/IGG/IGM EACH: CPT

## 2019-10-02 PROCEDURE — 85025 COMPLETE CBC W/AUTO DIFF WBC: CPT

## 2019-10-02 PROCEDURE — 83883 ASSAY NEPHELOMETRY NOT SPEC: CPT

## 2019-10-02 PROCEDURE — 83010 ASSAY OF HAPTOGLOBIN QUANT: CPT

## 2019-10-02 PROCEDURE — 82232 ASSAY OF BETA-2 PROTEIN: CPT

## 2019-10-02 PROCEDURE — 84443 ASSAY THYROID STIM HORMONE: CPT

## 2019-10-02 PROCEDURE — 86334 IMMUNOFIX E-PHORESIS SERUM: CPT

## 2019-10-02 PROCEDURE — 36415 COLL VENOUS BLD VENIPUNCTURE: CPT

## 2019-10-02 PROCEDURE — 80061 LIPID PANEL: CPT

## 2019-10-02 PROCEDURE — 83615 LACTATE (LD) (LDH) ENZYME: CPT

## 2019-10-02 PROCEDURE — 82607 VITAMIN B-12: CPT

## 2019-10-02 PROCEDURE — 82728 ASSAY OF FERRITIN: CPT

## 2019-10-03 ENCOUNTER — HOSPITAL ENCOUNTER (OUTPATIENT)
Dept: MAMMOGRAPHY | Age: 74
Discharge: HOME | End: 2019-10-05
Payer: MEDICARE

## 2019-10-03 DIAGNOSIS — Z12.31 ENCOUNTER FOR SCREENING MAMMOGRAM FOR BREAST CANCER: ICD-10-CM

## 2019-10-03 LAB — BETA-2 MICROGLOBULIN: 2.5 MG/L (ref 0.6–2.4)

## 2019-10-03 PROCEDURE — 77063 BREAST TOMOSYNTHESIS BI: CPT

## 2019-10-04 LAB
ALBUMIN (CALCULATED): 4.7 G/DL (ref 3.2–5.2)
ALBUMIN PERCENT: 75 % (ref 45–65)
ALPHA 1 PERCENT: 2 % (ref 3–6)
ALPHA 2 PERCENT: 10 % (ref 6–13)
ALPHA-1-GLOBULIN: 0.1 G/DL (ref 0.1–0.4)
ALPHA-2-GLOBULIN: 0.6 G/DL (ref 0.5–0.9)
BETA GLOBULIN: 0.5 G/DL (ref 0.5–1.1)
BETA PERCENT: 9 % (ref 11–19)
GAMMA GLOBULIN %: 5 % (ref 9–20)
GAMMA GLOBULIN: 0.3 G/DL (ref 0.5–1.5)
PATHOLOGIST: ABNORMAL
PATHOLOGIST: NORMAL
PROTEIN ELECTROPHORESIS, SERUM: ABNORMAL
SERUM IFX INTERP: NORMAL
TOTAL PROT. SUM,%: 101 % (ref 98–102)
TOTAL PROT. SUM: 6.2 G/DL (ref 6.3–8.2)
TOTAL PROTEIN: 6.2 G/DL (ref 6.4–8.3)

## 2019-10-09 ENCOUNTER — OFFICE VISIT (OUTPATIENT)
Dept: ONCOLOGY | Age: 74
End: 2019-10-09
Payer: MEDICARE

## 2019-10-09 ENCOUNTER — TELEPHONE (OUTPATIENT)
Dept: ONCOLOGY | Age: 74
End: 2019-10-09

## 2019-10-09 VITALS
TEMPERATURE: 97.7 F | WEIGHT: 141.4 LBS | SYSTOLIC BLOOD PRESSURE: 134 MMHG | DIASTOLIC BLOOD PRESSURE: 80 MMHG | HEART RATE: 75 BPM | BODY MASS INDEX: 25.86 KG/M2

## 2019-10-09 DIAGNOSIS — D69.6 THROMBOCYTOPENIA (HCC): ICD-10-CM

## 2019-10-09 DIAGNOSIS — C91.10 CLL (CHRONIC LYMPHOCYTIC LEUKEMIA) (HCC): Primary | ICD-10-CM

## 2019-10-09 PROCEDURE — G8400 PT W/DXA NO RESULTS DOC: HCPCS | Performed by: INTERNAL MEDICINE

## 2019-10-09 PROCEDURE — G8484 FLU IMMUNIZE NO ADMIN: HCPCS | Performed by: INTERNAL MEDICINE

## 2019-10-09 PROCEDURE — G8419 CALC BMI OUT NRM PARAM NOF/U: HCPCS | Performed by: INTERNAL MEDICINE

## 2019-10-09 PROCEDURE — 99211 OFF/OP EST MAY X REQ PHY/QHP: CPT | Performed by: INTERNAL MEDICINE

## 2019-10-09 PROCEDURE — 1036F TOBACCO NON-USER: CPT | Performed by: INTERNAL MEDICINE

## 2019-10-09 PROCEDURE — 1123F ACP DISCUSS/DSCN MKR DOCD: CPT | Performed by: INTERNAL MEDICINE

## 2019-10-09 PROCEDURE — 99214 OFFICE O/P EST MOD 30 MIN: CPT | Performed by: INTERNAL MEDICINE

## 2019-10-09 PROCEDURE — 4040F PNEUMOC VAC/ADMIN/RCVD: CPT | Performed by: INTERNAL MEDICINE

## 2019-10-09 PROCEDURE — G8427 DOCREV CUR MEDS BY ELIG CLIN: HCPCS | Performed by: INTERNAL MEDICINE

## 2019-10-09 PROCEDURE — 1090F PRES/ABSN URINE INCON ASSESS: CPT | Performed by: INTERNAL MEDICINE

## 2019-10-14 ENCOUNTER — OFFICE VISIT (OUTPATIENT)
Dept: PRIMARY CARE CLINIC | Age: 74
End: 2019-10-14
Payer: MEDICARE

## 2019-10-14 VITALS
OXYGEN SATURATION: 95 % | HEIGHT: 62 IN | SYSTOLIC BLOOD PRESSURE: 132 MMHG | WEIGHT: 142.6 LBS | BODY MASS INDEX: 26.24 KG/M2 | RESPIRATION RATE: 16 BRPM | TEMPERATURE: 98 F | HEART RATE: 71 BPM | DIASTOLIC BLOOD PRESSURE: 76 MMHG

## 2019-10-14 DIAGNOSIS — Z23 ENCOUNTER FOR IMMUNIZATION: Primary | ICD-10-CM

## 2019-10-14 DIAGNOSIS — Z00.00 ROUTINE GENERAL MEDICAL EXAMINATION AT A HEALTH CARE FACILITY: ICD-10-CM

## 2019-10-14 PROCEDURE — G0008 ADMIN INFLUENZA VIRUS VAC: HCPCS | Performed by: NURSE PRACTITIONER

## 2019-10-14 PROCEDURE — 90653 IIV ADJUVANT VACCINE IM: CPT | Performed by: NURSE PRACTITIONER

## 2019-10-14 PROCEDURE — 4040F PNEUMOC VAC/ADMIN/RCVD: CPT | Performed by: NURSE PRACTITIONER

## 2019-10-14 PROCEDURE — 1123F ACP DISCUSS/DSCN MKR DOCD: CPT | Performed by: NURSE PRACTITIONER

## 2019-10-14 PROCEDURE — G0402 INITIAL PREVENTIVE EXAM: HCPCS | Performed by: NURSE PRACTITIONER

## 2019-10-14 ASSESSMENT — PATIENT HEALTH QUESTIONNAIRE - PHQ9
SUM OF ALL RESPONSES TO PHQ QUESTIONS 1-9: 0
SUM OF ALL RESPONSES TO PHQ QUESTIONS 1-9: 0

## 2020-01-06 ENCOUNTER — OFFICE VISIT (OUTPATIENT)
Dept: PRIMARY CARE CLINIC | Age: 75
End: 2020-01-06
Payer: MEDICARE

## 2020-01-06 VITALS
OXYGEN SATURATION: 99 % | HEART RATE: 96 BPM | BODY MASS INDEX: 25.95 KG/M2 | DIASTOLIC BLOOD PRESSURE: 70 MMHG | HEIGHT: 62 IN | WEIGHT: 141 LBS | RESPIRATION RATE: 16 BRPM | SYSTOLIC BLOOD PRESSURE: 110 MMHG

## 2020-01-06 PROBLEM — D69.6 THROMBOCYTOPENIA (HCC): Status: ACTIVE | Noted: 2020-01-06

## 2020-01-06 PROCEDURE — 4040F PNEUMOC VAC/ADMIN/RCVD: CPT | Performed by: NURSE PRACTITIONER

## 2020-01-06 PROCEDURE — G8427 DOCREV CUR MEDS BY ELIG CLIN: HCPCS | Performed by: NURSE PRACTITIONER

## 2020-01-06 PROCEDURE — G8417 CALC BMI ABV UP PARAM F/U: HCPCS | Performed by: NURSE PRACTITIONER

## 2020-01-06 PROCEDURE — 99213 OFFICE O/P EST LOW 20 MIN: CPT | Performed by: NURSE PRACTITIONER

## 2020-01-06 PROCEDURE — G8482 FLU IMMUNIZE ORDER/ADMIN: HCPCS | Performed by: NURSE PRACTITIONER

## 2020-01-06 PROCEDURE — 1123F ACP DISCUSS/DSCN MKR DOCD: CPT | Performed by: NURSE PRACTITIONER

## 2020-01-06 PROCEDURE — 1090F PRES/ABSN URINE INCON ASSESS: CPT | Performed by: NURSE PRACTITIONER

## 2020-01-06 PROCEDURE — 1036F TOBACCO NON-USER: CPT | Performed by: NURSE PRACTITIONER

## 2020-01-06 PROCEDURE — G8400 PT W/DXA NO RESULTS DOC: HCPCS | Performed by: NURSE PRACTITIONER

## 2020-01-06 ASSESSMENT — ENCOUNTER SYMPTOMS
DIARRHEA: 0
COUGH: 0
EYE PAIN: 0
NAUSEA: 0
BACK PAIN: 0
EYE REDNESS: 0
CONSTIPATION: 0
RHINORRHEA: 1
SHORTNESS OF BREATH: 0

## 2020-01-06 ASSESSMENT — PATIENT HEALTH QUESTIONNAIRE - PHQ9
2. FEELING DOWN, DEPRESSED OR HOPELESS: 0
SUM OF ALL RESPONSES TO PHQ9 QUESTIONS 1 & 2: 0
SUM OF ALL RESPONSES TO PHQ QUESTIONS 1-9: 0
SUM OF ALL RESPONSES TO PHQ QUESTIONS 1-9: 0
1. LITTLE INTEREST OR PLEASURE IN DOING THINGS: 0

## 2020-01-06 NOTE — PROGRESS NOTES
m)   Wt 141 lb (64 kg)   SpO2 99%   BMI 26.00 kg/m²   Physical Exam  Vitals signs and nursing note reviewed. Constitutional:       Appearance: Normal appearance. HENT:      Head: Normocephalic and atraumatic. Right Ear: Ear canal and external ear normal. Tympanic membrane is scarred. Left Ear: Tympanic membrane, ear canal and external ear normal.      Nose: Nose normal.      Mouth/Throat:      Mouth: Mucous membranes are moist.      Pharynx: Oropharynx is clear. Eyes:      Conjunctiva/sclera: Conjunctivae normal.      Pupils: Pupils are equal, round, and reactive to light. Cardiovascular:      Rate and Rhythm: Normal rate and regular rhythm. Pulses: Normal pulses. Heart sounds: Normal heart sounds. No murmur. Pulmonary:      Breath sounds: Normal breath sounds. Abdominal:      General: Bowel sounds are normal.      Palpations: Abdomen is soft. Skin:     General: Skin is warm and dry. Capillary Refill: Capillary refill takes less than 2 seconds. Neurological:      Mental Status: She is alert and oriented to person, place, and time. Cranial Nerves: No cranial nerve deficit. Psychiatric:         Mood and Affect: Mood normal.         Thought Content: Thought content normal.         Judgment: Judgment normal.         Assessment:       Diagnosis Orders   1. Ear congestion, bilateral  loratadine (CLARITIN) 5 MG chewable tablet   2. CLL (chronic lymphocytic leukemia) ([facility] [address]     3. Thrombocytopenia (HCC)          Plan:     CLL and thrombocytopenia is controlled  - managed by Dr. Andrea Ervin    Loratadine 5 mg daily for ear congestion    Return if symptoms worsen or fail to improve. No orders of the defined types were placed in this encounter. Orders Placed This Encounter   Medications    loratadine (CLARITIN) 5 MG chewable tablet     Sig: Take 1 tablet by mouth daily     Dispense:  1 tablet     Refill:  0       Patient given educationalmaterials - see patient instructions. Discussed use, benefit, and side effectsof prescribed medications. All patient questions answered. Pt voiced understanding. Reviewed health maintenance. Instructed to continue current medications, diet andexercise. Patient agreed with treatment plan. Follow up as directed.      Electronicallysigned by KIRA Colon CNP on 1/6/2020 at 9:14 PM

## 2020-01-09 ENCOUNTER — OFFICE VISIT (OUTPATIENT)
Dept: PODIATRY | Age: 75
End: 2020-01-09
Payer: MEDICARE

## 2020-01-09 VITALS — HEIGHT: 61 IN | BODY MASS INDEX: 26.62 KG/M2 | WEIGHT: 141 LBS

## 2020-01-09 PROCEDURE — 1123F ACP DISCUSS/DSCN MKR DOCD: CPT | Performed by: PODIATRIST

## 2020-01-09 PROCEDURE — G8400 PT W/DXA NO RESULTS DOC: HCPCS | Performed by: PODIATRIST

## 2020-01-09 PROCEDURE — 4040F PNEUMOC VAC/ADMIN/RCVD: CPT | Performed by: PODIATRIST

## 2020-01-09 PROCEDURE — G8482 FLU IMMUNIZE ORDER/ADMIN: HCPCS | Performed by: PODIATRIST

## 2020-01-09 PROCEDURE — G8427 DOCREV CUR MEDS BY ELIG CLIN: HCPCS | Performed by: PODIATRIST

## 2020-01-09 PROCEDURE — G8417 CALC BMI ABV UP PARAM F/U: HCPCS | Performed by: PODIATRIST

## 2020-01-09 PROCEDURE — 99203 OFFICE O/P NEW LOW 30 MIN: CPT | Performed by: PODIATRIST

## 2020-01-09 PROCEDURE — 1090F PRES/ABSN URINE INCON ASSESS: CPT | Performed by: PODIATRIST

## 2020-01-09 PROCEDURE — 1036F TOBACCO NON-USER: CPT | Performed by: PODIATRIST

## 2020-01-09 PROCEDURE — 11721 DEBRIDE NAIL 6 OR MORE: CPT | Performed by: PODIATRIST

## 2020-01-09 NOTE — PROGRESS NOTES
[address] [facility] [address] [id number] [address]  Dept: [phone]    NEW PATIENT NAIL PAIN PROGRESS NOTE  Date of patient's visit: 1/9/2020  Patient's Name:  Warren Fairchild YOB: 1945            Patient Care Team:  KIRA Stanley CNP as PCP - General (Family Nurse Practitioner)  KIRA Stanley CNP as PCP - [facility] Empaneled Provider      Chief Complaint   Patient presents with    New Patient    Nail Problem       Subjective: This Warren Fairchild comes to clinic for foot and nail care. Pt currently has complaint of thickened, painful, elongated nails that he/she cannot manage by themselves. Pt. Relates pain to nails with shoe gear. Pt's primary care physician is KIRA Stanley CNP last seen January 6 2020   Past Medical History:   Diagnosis Date    CLL (chronic lymphocytic leukemia) ([facility] [address] 2004    Hyperlipidemia     Hypothyroidism     MRSA (methicillin resistant Staphylococcus aureus) infection        Allergies   Allergen Reactions    Penicillins Hives, Itching and Swelling    Epinephrine Other (See Comments)     Can tolerate lower doses, but if higher dose: will cause high heart rate. Current Outpatient Medications on File Prior to Visit   Medication Sig Dispense Refill    loratadine (CLARITIN) 5 MG chewable tablet Take 1 tablet by mouth daily 1 tablet 0    ramipril (ALTACE) 5 MG capsule Take 1 capsule by mouth daily 90 capsule 3    rosuvastatin (CRESTOR) 5 MG tablet Take 1 tablet by mouth daily 90 tablet 2    levothyroxine (SYNTHROID) 88 MCG tablet Take 1 tablet by mouth Daily 90 tablet 3     No current facility-administered medications on file prior to visit.       Review of Systems      Review of Systems:   History obtained from chart review and the patient  General ROS: negative for - chills, fatigue, fever, night sweats or weight gain  Constitutional: Negative for chills, diaphoresis, fatigue, fever and 2, Bilateral.     Vascular: DP and PT pulses palpable 2/4, Bilateral.  CFT <5 seconds, Bilateral.  Hair growth absent to the level of the digits, Bilateral.  Edema present, Bilateral.  Varicosities absent, Bilateral. Erythema absent, Bilateral    Neurological: Sensation intact to light touch to level of digits, Bilateral.  Protective sensation intact 10/10 sites via 5.07/10g [place] Monofilament, Bilateral.  negative Tinel's, Bilateral.  negative Valleix sign, Bilateral.      Integument: Warm, dry, supple, Bilateral.  Open lesion absent, Bilateral.  Interdigital maceration absent to web spaces 4, Bilateral.  Nails 1-5 left and 1-5 right thickened > 3.0 mm, dystrophic and crumbly, discolored with subungual debris. Fissures absent, Bilateral.     Assessment:  66 y.o. female with:    Diagnosis Orders   1. Dermatophytosis of nail  61803 - CT DEBRIDEMENT OF NAILS, 6 OR MORE   2. Bilateral lower extremity pain  53843 - CT DEBRIDEMENT OF NAILS, 6 OR MORE   3. Ingrown nail  02836 - CT DEBRIDEMENT OF NAILS, 6 OR MORE   4. Hammer toes of both feet  32065 - CT DEBRIDEMENT OF NAILS, 6 OR MORE           Plan:   Pt was evaluated and examined. Patient was given personalized discharge instructions. Nails 1-10 were debrided in length and thickness sharply with a nail nipper and  without incident. Pt will follow up in 9 weeks or sooner if any problems arise. Diagnosis was discussed with the pt and all of their questions were answered in detail. Proper foot hygiene and care was discussed with the pt. Patient to check feet daily and contact the office with any questions/problems/concerns. Other comorbidity noted and will be managed by PCP. Pain waiver discussed with patient and confirmed.    1/9/2020    Electronically signed by Lebron Loya DPM on 1/9/2020 at 9:38 AM  1/9/2020

## 2020-01-16 ENCOUNTER — TELEPHONE (OUTPATIENT)
Dept: ONCOLOGY | Age: 75
End: 2020-01-16

## 2020-02-04 ENCOUNTER — TELEPHONE (OUTPATIENT)
Dept: ONCOLOGY | Age: 75
End: 2020-02-04

## 2020-02-04 ENCOUNTER — HOSPITAL ENCOUNTER (OUTPATIENT)
Age: 75
Discharge: HOME | End: 2020-02-04
Payer: MEDICARE

## 2020-02-04 DIAGNOSIS — D69.6 THROMBOCYTOPENIA (HCC): ICD-10-CM

## 2020-02-04 DIAGNOSIS — C91.10 CLL (CHRONIC LYMPHOCYTIC LEUKEMIA) (HCC): ICD-10-CM

## 2020-02-04 LAB
ABSOLUTE EOS #: 0 K/UL (ref 0–0.4)
ABSOLUTE IMMATURE GRANULOCYTE: ABNORMAL K/UL (ref 0–0.3)
ABSOLUTE LYMPH #: 39.32 K/UL (ref 1–4.8)
ABSOLUTE MONO #: 0.86 K/UL (ref 0.1–0.8)
ALBUMIN SERPL-MCNC: 4.6 G/DL (ref 3.5–5.2)
ALBUMIN/GLOBULIN RATIO: 2.1 (ref 1–2.5)
ALP BLD-CCNC: 98 U/L (ref 35–104)
ALT SERPL-CCNC: 9 U/L (ref 5–33)
ANION GAP SERPL CALCULATED.3IONS-SCNC: 7 MMOL/L (ref 9–17)
AST SERPL-CCNC: 23 U/L
ATYPICAL LYMPHOCYTE ABSOLUTE COUNT: 0.86 K/UL
ATYPICAL LYMPHOCYTES: 2 %
BASOPHILS # BLD: 0 % (ref 0–2)
BASOPHILS ABSOLUTE: 0 K/UL (ref 0–0.2)
BILIRUB SERPL-MCNC: 0.56 MG/DL (ref 0.3–1.2)
BUN BLDV-MCNC: 18 MG/DL (ref 8–23)
BUN/CREAT BLD: ABNORMAL (ref 9–20)
CALCIUM SERPL-MCNC: 9.9 MG/DL (ref 8.6–10.4)
CHLORIDE BLD-SCNC: 102 MMOL/L (ref 98–107)
CO2: 30 MMOL/L (ref 20–31)
CREAT SERPL-MCNC: 0.74 MG/DL (ref 0.5–0.9)
DIFFERENTIAL TYPE: ABNORMAL
EOSINOPHILS RELATIVE PERCENT: 0 % (ref 1–4)
GFR AFRICAN AMERICAN: >60 ML/MIN
GFR NON-AFRICAN AMERICAN: >60 ML/MIN
GFR SERPL CREATININE-BSD FRML MDRD: ABNORMAL ML/MIN/{1.73_M2}
GFR SERPL CREATININE-BSD FRML MDRD: ABNORMAL ML/MIN/{1.73_M2}
GLUCOSE BLD-MCNC: 70 MG/DL (ref 70–99)
HCT VFR BLD CALC: 41.1 % (ref 36–46)
HEMOGLOBIN: 13.3 G/DL (ref 12–16)
IMMATURE GRANULOCYTES: ABNORMAL %
LYMPHOCYTES # BLD: 91 % (ref 24–44)
MCH RBC QN AUTO: 32.3 PG (ref 26–34)
MCHC RBC AUTO-ENTMCNC: 32.3 G/DL (ref 31–37)
MCV RBC AUTO: 100 FL (ref 80–100)
MONOCYTES # BLD: 2 % (ref 1–7)
MORPHOLOGY: ABNORMAL
NRBC AUTOMATED: ABNORMAL PER 100 WBC
PDW BLD-RTO: 14 % (ref 12.5–15.4)
PLATELET # BLD: 139 K/UL (ref 140–450)
PLATELET ESTIMATE: ABNORMAL
PMV BLD AUTO: 7.5 FL (ref 6–12)
POTASSIUM SERPL-SCNC: 5.4 MMOL/L (ref 3.7–5.3)
RBC # BLD: 4.11 M/UL (ref 4–5.2)
RBC # BLD: ABNORMAL 10*6/UL
SEG NEUTROPHILS: 5 % (ref 36–66)
SEGMENTED NEUTROPHILS ABSOLUTE COUNT: 2.16 K/UL (ref 1.8–7.7)
SODIUM BLD-SCNC: 139 MMOL/L (ref 135–144)
TOTAL PROTEIN: 6.8 G/DL (ref 6.4–8.3)
WBC # BLD: 43.2 K/UL (ref 3.5–11)
WBC # BLD: ABNORMAL 10*3/UL

## 2020-02-04 PROCEDURE — 80053 COMPREHEN METABOLIC PANEL: CPT

## 2020-02-04 PROCEDURE — 85025 COMPLETE CBC W/AUTO DIFF WBC: CPT

## 2020-02-04 PROCEDURE — 36415 COLL VENOUS BLD VENIPUNCTURE: CPT

## 2020-02-04 NOTE — TELEPHONE ENCOUNTER
Lan Carreon from lab called to let us know the patient had a critical WBC of 43.2 today. Patient is a CLL patient. He WBC run in that range. Patient is switching to Dr. Jacinto Mcintyre from Dr. Yan Su. She has an appt with Dr. Jacinto Mcintyre on 2/12/20, next Wednesday.

## 2020-02-12 ENCOUNTER — TELEPHONE (OUTPATIENT)
Dept: ONCOLOGY | Age: 75
End: 2020-02-12

## 2020-02-12 ENCOUNTER — OFFICE VISIT (OUTPATIENT)
Dept: ONCOLOGY | Age: 75
End: 2020-02-12
Payer: MEDICARE

## 2020-02-12 VITALS
HEART RATE: 75 BPM | TEMPERATURE: 97.8 F | RESPIRATION RATE: 16 BRPM | DIASTOLIC BLOOD PRESSURE: 76 MMHG | SYSTOLIC BLOOD PRESSURE: 127 MMHG | WEIGHT: 144.2 LBS | BODY MASS INDEX: 27.25 KG/M2

## 2020-02-12 PROCEDURE — 99214 OFFICE O/P EST MOD 30 MIN: CPT | Performed by: INTERNAL MEDICINE

## 2020-02-12 PROCEDURE — G8417 CALC BMI ABV UP PARAM F/U: HCPCS | Performed by: INTERNAL MEDICINE

## 2020-02-12 PROCEDURE — 1123F ACP DISCUSS/DSCN MKR DOCD: CPT | Performed by: INTERNAL MEDICINE

## 2020-02-12 PROCEDURE — 4040F PNEUMOC VAC/ADMIN/RCVD: CPT | Performed by: INTERNAL MEDICINE

## 2020-02-12 PROCEDURE — G8400 PT W/DXA NO RESULTS DOC: HCPCS | Performed by: INTERNAL MEDICINE

## 2020-02-12 PROCEDURE — 1036F TOBACCO NON-USER: CPT | Performed by: INTERNAL MEDICINE

## 2020-02-12 PROCEDURE — 1090F PRES/ABSN URINE INCON ASSESS: CPT | Performed by: INTERNAL MEDICINE

## 2020-02-12 PROCEDURE — 99211 OFF/OP EST MAY X REQ PHY/QHP: CPT | Performed by: INTERNAL MEDICINE

## 2020-02-12 PROCEDURE — G8482 FLU IMMUNIZE ORDER/ADMIN: HCPCS | Performed by: INTERNAL MEDICINE

## 2020-02-12 PROCEDURE — G8427 DOCREV CUR MEDS BY ELIG CLIN: HCPCS | Performed by: INTERNAL MEDICINE

## 2020-02-12 NOTE — PROGRESS NOTES
hemoptysis. Cardiovascular: Denies chest pain, PND or orthopnea. No L E swelling or palpitations. Gastrointestinal: No nausea or vomiting, abdominal pain, diarrhea or constipation. Genitourinary: Denies dysuria, hematuria, frequency, urgency or incontinence. Neurological: Denies headaches, decreased LOC, no sensory or motor focal deficits. Musculoskeletal: No arthralgia no back pain or joint swelling. Skin: There are no rashes or bleeding. Psych: Denies hallucinations or intentions to harm self      PHYSICAL EXAM:     Physical Exam  Vitals signs and nursing note reviewed. Constitutional:       General: She is not in acute distress. Appearance: She is well-developed. HENT:      Head: Normocephalic and atraumatic. Eyes:      Pupils: Pupils are equal, round, and reactive to light. Neck:      Musculoskeletal: Neck supple. Cardiovascular:      Rate and Rhythm: Normal rate and regular rhythm. Heart sounds: Normal heart sounds. No murmur. Pulmonary:      Effort: Pulmonary effort is normal. No respiratory distress. Breath sounds: Normal breath sounds. No stridor. No wheezing. Abdominal:      General: Bowel sounds are normal. There is no distension. Palpations: Abdomen is soft. Tenderness: There is no abdominal tenderness. Musculoskeletal: Normal range of motion. Skin:     General: Skin is warm and dry. Findings: No rash. Neurological:      Mental Status: She is alert and oriented to person, place, and time. Cranial Nerves: No cranial nerve deficit.    Psychiatric:         Behavior: Behavior normal.         LABS:   Results for orders placed or performed during the hospital encounter of 02/04/20   Comprehensive Metabolic Panel   Result Value Ref Range    Glucose 70 70 - 99 mg/dL    BUN 18 8 - 23 mg/dL    CREATININE 0.74 0.50 - 0.90 mg/dL    Bun/Cre Ratio NOT REPORTED 9 - 20    Calcium 9.9 8.6 - 10.4 mg/dL    Sodium 139 135 - 144 mmol/L    Potassium 5.4 (H) 3.7 - 5.3 mmol/L    Chloride 102 98 - 107 mmol/L    CO2 30 20 - 31 mmol/L    Anion Gap 7 (L) 9 - 17 mmol/L    Alkaline Phosphatase 98 35 - 104 U/L    ALT 9 5 - 33 U/L    AST 23 <32 U/L    Total Bilirubin 0.56 0.3 - 1.2 mg/dL    Total Protein 6.8 6.4 - 8.3 g/dL    Alb 4.6 3.5 - 5.2 g/dL    Albumin/Globulin Ratio 2.1 1.0 - 2.5    GFR Non-African American >60 >60 mL/min    GFR African American >60 >60 mL/min    GFR Comment          GFR Staging NOT REPORTED    CBC Auto Differential   Result Value Ref Range    WBC 43.2 (HH) 3.5 - 11.0 k/uL    RBC 4.11 4.0 - 5.2 m/uL    Hemoglobin 13.3 12.0 - 16.0 g/dL    Hematocrit 41.1 36 - 46 %    .0 80 - 100 fL    MCH 32.3 26 - 34 pg    MCHC 32.3 31 - 37 g/dL    RDW 14.0 12.5 - 15.4 %    Platelets 713 (L) 762 - 450 k/uL    MPV 7.5 6.0 - 12.0 fL    NRBC Automated NOT REPORTED per 100 WBC    Differential Type NOT REPORTED     Immature Granulocytes NOT REPORTED 0 %    Absolute Immature Granulocyte NOT REPORTED 0.00 - 0.30 k/uL    WBC Morphology NOT REPORTED     RBC Morphology NOT REPORTED     Platelet Estimate NOT REPORTED     Seg Neutrophils 5 (L) 36 - 66 %    Lymphocytes 91 (H) 24 - 44 %    Atypical Lymphocytes 2 %    Monocytes 2 1 - 7 %    Eosinophils % 0 (L) 1 - 4 %    Basophils 0 0 - 2 %    Segs Absolute 2.16 1.8 - 7.7 k/uL    Absolute Lymph # 39.32 (H) 1.0 - 4.8 k/uL    Atypical Lymphocytes Absolute 0.86 k/uL    Absolute Mono # 0.86 (H) 0.1 - 0.8 k/uL    Absolute Eos # 0.00 0.0 - 0.4 k/uL    Basophils Absolute 0.00 0.0 - 0.2 k/uL    Morphology SMUDGE CELLS PRESENT        IMPRESSION:     1. CLL (chronic lymphocytic leukemia) ([facility]        Patient Active Problem List   Diagnosis    Mixed hyperlipidemia    Hypothyroidism    Essential hypertension    CLL (chronic lymphocytic leukemia) ([facility]    History of MRSA infection    Thrombocytopenia ([address]       PLAN:   We reviewed her diagnostic and surveillance history.  Her lab work was reviewed and discussed, she is slightly hyperkalemic

## 2020-04-09 RX ORDER — ROSUVASTATIN CALCIUM 5 MG/1
5 TABLET, COATED ORAL DAILY
Qty: 90 TABLET | Refills: 2 | Status: SHIPPED | OUTPATIENT
Start: 2020-04-09 | End: 2021-10-04 | Stop reason: SDUPTHER

## 2020-04-28 RX ORDER — LEVOTHYROXINE SODIUM 88 UG/1
88 TABLET ORAL DAILY
Qty: 90 TABLET | Refills: 3 | Status: SHIPPED | OUTPATIENT
Start: 2020-04-28 | End: 2021-05-03 | Stop reason: SDUPTHER

## 2020-06-02 ENCOUNTER — HOSPITAL ENCOUNTER (OUTPATIENT)
Age: 75
Discharge: HOME | End: 2020-06-02
Payer: MEDICARE

## 2020-06-02 DIAGNOSIS — D69.6 THROMBOCYTOPENIA (HCC): ICD-10-CM

## 2020-06-02 DIAGNOSIS — C91.10 CLL (CHRONIC LYMPHOCYTIC LEUKEMIA) (HCC): ICD-10-CM

## 2020-06-02 DIAGNOSIS — E03.9 HYPOTHYROIDISM, UNSPECIFIED TYPE: ICD-10-CM

## 2020-06-02 DIAGNOSIS — D80.1 HYPOGAMMAGLOBULINEMIA (HCC): ICD-10-CM

## 2020-06-02 LAB
ABSOLUTE EOS #: 0 K/UL (ref 0–0.4)
ABSOLUTE IMMATURE GRANULOCYTE: ABNORMAL K/UL (ref 0–0.3)
ABSOLUTE LYMPH #: 35.91 K/UL (ref 1–4.8)
ABSOLUTE MONO #: 0.38 K/UL (ref 0.1–1.2)
ALBUMIN SERPL-MCNC: 4.6 G/DL (ref 3.5–5.2)
ALBUMIN/GLOBULIN RATIO: 2.7 (ref 1–2.5)
ALP BLD-CCNC: 72 U/L (ref 35–104)
ALT SERPL-CCNC: 12 U/L (ref 5–33)
ANION GAP SERPL CALCULATED.3IONS-SCNC: 9 MMOL/L (ref 9–17)
AST SERPL-CCNC: 30 U/L
BASOPHILS # BLD: 0 % (ref 0–2)
BASOPHILS ABSOLUTE: 0 K/UL (ref 0–0.2)
BILIRUB SERPL-MCNC: 0.63 MG/DL (ref 0.3–1.2)
BUN BLDV-MCNC: 14 MG/DL (ref 8–23)
BUN/CREAT BLD: ABNORMAL (ref 9–20)
CALCIUM SERPL-MCNC: 9.9 MG/DL (ref 8.6–10.4)
CHLORIDE BLD-SCNC: 102 MMOL/L (ref 98–107)
CO2: 30 MMOL/L (ref 20–31)
CREAT SERPL-MCNC: 0.68 MG/DL (ref 0.5–0.9)
DIFFERENTIAL TYPE: ABNORMAL
EOSINOPHILS RELATIVE PERCENT: 0 % (ref 1–4)
GFR AFRICAN AMERICAN: >60 ML/MIN
GFR NON-AFRICAN AMERICAN: >60 ML/MIN
GFR SERPL CREATININE-BSD FRML MDRD: ABNORMAL ML/MIN/{1.73_M2}
GFR SERPL CREATININE-BSD FRML MDRD: ABNORMAL ML/MIN/{1.73_M2}
GLUCOSE BLD-MCNC: 95 MG/DL (ref 70–99)
HCT VFR BLD CALC: 40.5 % (ref 36–46)
HEMOGLOBIN: 13.1 G/DL (ref 12–16)
IGA, LOW RANGE: 26 MG/DL (ref 70–400)
IGA: ABNORMAL MG/DL (ref 70–400)
IGG: 456 MG/DL (ref 700–1600)
IGM: <25 MG/DL (ref 40–230)
IMMATURE GRANULOCYTES: ABNORMAL %
LACTATE DEHYDROGENASE: 228 U/L (ref 135–214)
LYMPHOCYTES # BLD: 94 % (ref 24–44)
MCH RBC QN AUTO: 32.4 PG (ref 26–34)
MCHC RBC AUTO-ENTMCNC: 32.4 G/DL (ref 31–37)
MCV RBC AUTO: 99.9 FL (ref 80–100)
MONOCYTES # BLD: 1 % (ref 2–11)
MORPHOLOGY: ABNORMAL
NRBC AUTOMATED: ABNORMAL PER 100 WBC
PDW BLD-RTO: 14.3 % (ref 12.5–15.4)
PLATELET # BLD: 115 K/UL (ref 140–450)
PLATELET ESTIMATE: ABNORMAL
PMV BLD AUTO: 7.7 FL (ref 6–12)
POTASSIUM SERPL-SCNC: 5.3 MMOL/L (ref 3.7–5.3)
RBC # BLD: 4.05 M/UL (ref 4–5.2)
RBC # BLD: ABNORMAL 10*6/UL
SEG NEUTROPHILS: 5 % (ref 36–66)
SEGMENTED NEUTROPHILS ABSOLUTE COUNT: 1.91 K/UL (ref 1.8–7.7)
SODIUM BLD-SCNC: 141 MMOL/L (ref 135–144)
TOTAL PROTEIN: 6.3 G/DL (ref 6.4–8.3)
WBC # BLD: 38.2 K/UL (ref 3.5–11)
WBC # BLD: ABNORMAL 10*3/UL

## 2020-06-02 PROCEDURE — 82784 ASSAY IGA/IGD/IGG/IGM EACH: CPT

## 2020-06-02 PROCEDURE — 85025 COMPLETE CBC W/AUTO DIFF WBC: CPT

## 2020-06-02 PROCEDURE — 36415 COLL VENOUS BLD VENIPUNCTURE: CPT

## 2020-06-02 PROCEDURE — 83615 LACTATE (LD) (LDH) ENZYME: CPT

## 2020-06-02 PROCEDURE — 80053 COMPREHEN METABOLIC PANEL: CPT

## 2020-06-03 RX ORDER — RAMIPRIL 5 MG/1
5 CAPSULE ORAL DAILY
Qty: 90 CAPSULE | Refills: 3 | Status: SHIPPED | OUTPATIENT
Start: 2020-06-03 | End: 2021-06-07 | Stop reason: SDUPTHER

## 2020-06-10 ENCOUNTER — OFFICE VISIT (OUTPATIENT)
Dept: ONCOLOGY | Age: 75
End: 2020-06-10
Payer: MEDICARE

## 2020-06-10 ENCOUNTER — TELEPHONE (OUTPATIENT)
Dept: ONCOLOGY | Age: 75
End: 2020-06-10

## 2020-06-10 VITALS
WEIGHT: 146.6 LBS | TEMPERATURE: 97.9 F | SYSTOLIC BLOOD PRESSURE: 145 MMHG | DIASTOLIC BLOOD PRESSURE: 81 MMHG | HEART RATE: 67 BPM | BODY MASS INDEX: 27.7 KG/M2

## 2020-06-10 PROCEDURE — 99211 OFF/OP EST MAY X REQ PHY/QHP: CPT | Performed by: INTERNAL MEDICINE

## 2020-06-10 PROCEDURE — G8427 DOCREV CUR MEDS BY ELIG CLIN: HCPCS | Performed by: INTERNAL MEDICINE

## 2020-06-10 PROCEDURE — 1036F TOBACCO NON-USER: CPT | Performed by: INTERNAL MEDICINE

## 2020-06-10 PROCEDURE — 99213 OFFICE O/P EST LOW 20 MIN: CPT | Performed by: INTERNAL MEDICINE

## 2020-06-10 PROCEDURE — G8417 CALC BMI ABV UP PARAM F/U: HCPCS | Performed by: INTERNAL MEDICINE

## 2020-06-10 PROCEDURE — G8400 PT W/DXA NO RESULTS DOC: HCPCS | Performed by: INTERNAL MEDICINE

## 2020-06-10 PROCEDURE — 1090F PRES/ABSN URINE INCON ASSESS: CPT | Performed by: INTERNAL MEDICINE

## 2020-06-10 PROCEDURE — 1123F ACP DISCUSS/DSCN MKR DOCD: CPT | Performed by: INTERNAL MEDICINE

## 2020-06-10 PROCEDURE — 4040F PNEUMOC VAC/ADMIN/RCVD: CPT | Performed by: INTERNAL MEDICINE

## 2020-06-10 NOTE — PROGRESS NOTES
ONCOLOGY NOTE    PCP: KIRA Thomas CNP  Referring Provider: No ref. provider found     DIAGNOSIS:   CLL    CURRENT TREATMENT:   Surveillance    BRIEF CASE HISTORY:   Bill Kapoor is a very pleasant 66 y.o. female who is presenting for follow-up for CLL. She was referred to me in 02/2020 for management of CLL/SLL which was diagnosed around September 2004. She had been living in [place] for quite some time. She moved to [facility] in early 2019. She had MRSA infection in 2016. INTERIM HISTORY: The patient presents today for follow up for CLL and to review lab work. She is doing well with no fever, chills, night sweats, recurrent or prolonged infection, or weight loss. PAST MEDICAL HISTORY:      Diagnosis Date    CLL (chronic lymphocytic leukemia) ([facility] [facility] 2004    Hyperlipidemia     Hypothyroidism     MRSA (methicillin resistant Staphylococcus aureus) infection        PAST SURGICAL HISTORY:      Procedure Laterality Date    BREAST BIOPSY Right        CURRENT MEDICATIONS:   Current Outpatient Medications   Medication Sig Dispense Refill    ramipril (ALTACE) 5 MG capsule Take 1 capsule by mouth daily 90 capsule 3    levothyroxine (SYNTHROID) 88 MCG tablet Take 1 tablet by mouth Daily 90 tablet 3    rosuvastatin (CRESTOR) 5 MG tablet Take 1 tablet by mouth daily 90 tablet 2    loratadine (CLARITIN) 5 MG chewable tablet Take 1 tablet by mouth daily 1 tablet 0     No current facility-administered medications for this visit. Vitals:    06/10/20 1119   BP: (!) 145/81   Pulse: 67   Temp:      General: No fever or night sweats, Weight is stable. ENT: No double or blurred vision, no hearing problem, no dysphagia or sore throat   Respiratory: No chest pain, no shortness of breath, no cough or hemoptysis. Cardiovascular: Denies chest pain, PND or orthopnea. No L E swelling or palpitations.   Gastrointestinal: No nausea or vomiting, abdominal pain, diarrhea or constipation. Genitourinary: Denies dysuria, hematuria, frequency, urgency or incontinence. Neurological: Denies headaches, decreased LOC, no sensory or motor focal deficits. Musculoskeletal: No arthralgia no back pain or joint swelling. Skin: There are no rashes or bleeding. Psych: Denies hallucinations or intentions to harm self      PHYSICAL EXAM:         Physical Exam  Vitals signs and nursing note reviewed. Constitutional:       General: She is not in acute distress. Appearance: She is well-developed. HENT:      Head: Normocephalic and atraumatic. Eyes:      Pupils: Pupils are equal, round, and reactive to light. Neck:      Musculoskeletal: Neck supple. Cardiovascular:      Rate and Rhythm: Normal rate and regular rhythm. Heart sounds: Normal heart sounds. No murmur. Pulmonary:      Effort: Pulmonary effort is normal. No respiratory distress. Breath sounds: Normal breath sounds. No stridor. No wheezing. Abdominal:      General: Bowel sounds are normal. There is no distension. Palpations: Abdomen is soft. Tenderness: There is no abdominal tenderness. Musculoskeletal: Normal range of motion. Skin:     General: Skin is warm and dry. Findings: No rash. Neurological:      Mental Status: She is alert and oriented to person, place, and time. Cranial Nerves: No cranial nerve deficit. Psychiatric:         Behavior: Behavior normal.         LABS:   Results for orders placed or performed during the hospital encounter of 06/02/20   Immunoglobulin Panel (IgG, IgA, IgM)   Result Value Ref Range    IgG 456 (L) 700 - 1600 mg/dL    IgA Refer to IGA, Low Range for result.  70 - 400 mg/dL    IgM <25 (L) 40 - 230 mg/dL   Lactate Dehydrogenase   Result Value Ref Range     (H) 135 - 214 U/L   Comprehensive Metabolic Panel   Result Value Ref Range    Glucose 95 70 - 99 mg/dL    BUN 14 8 - 23 mg/dL    CREATININE 0.68 0.50 - 0.90 mg/dL    Bun/Cre Ratio NOT REPORTED 9 - 20    Calcium 9.9 8.6 - 10.4 mg/dL    Sodium 141 135 - 144 mmol/L    Potassium 5.3 3.7 - 5.3 mmol/L    Chloride 102 98 - 107 mmol/L    CO2 30 20 - 31 mmol/L    Anion Gap 9 9 - 17 mmol/L    Alkaline Phosphatase 72 35 - 104 U/L    ALT 12 5 - 33 U/L    AST 30 <32 U/L    Total Bilirubin 0.63 0.3 - 1.2 mg/dL    Total Protein 6.3 (L) 6.4 - 8.3 g/dL    Alb 4.6 3.5 - 5.2 g/dL    Albumin/Globulin Ratio 2.7 (H) 1.0 - 2.5    GFR Non-African American >60 >60 mL/min    GFR African American >60 >60 mL/min    GFR Comment          GFR Staging NOT REPORTED    CBC Auto Differential   Result Value Ref Range    WBC 38.2 (HH) 3.5 - 11.0 k/uL    RBC 4.05 4.0 - 5.2 m/uL    Hemoglobin 13.1 12.0 - 16.0 g/dL    Hematocrit 40.5 36 - 46 %    MCV 99.9 80 - 100 fL    MCH 32.4 26 - 34 pg    MCHC 32.4 31 - 37 g/dL    RDW 14.3 12.5 - 15.4 %    Platelets 462 (L) 588 - 450 k/uL    MPV 7.7 6.0 - 12.0 fL    NRBC Automated NOT REPORTED per 100 WBC    Differential Type NOT REPORTED     Immature Granulocytes NOT REPORTED 0 %    Absolute Immature Granulocyte NOT REPORTED 0.00 - 0.30 k/uL    WBC Morphology NOT REPORTED     RBC Morphology NOT REPORTED     Platelet Estimate NOT REPORTED     Seg Neutrophils 5 (L) 36 - 66 %    Lymphocytes 94 (H) 24 - 44 %    Monocytes 1 (L) 2 - 11 %    Eosinophils % 0 (L) 1 - 4 %    Basophils 0 0 - 2 %    Segs Absolute 1.91 1.8 - 7.7 k/uL    Absolute Lymph # 35.91 (H) 1.0 - 4.8 k/uL    Absolute Mono # 0.38 0.1 - 1.2 k/uL    Absolute Eos # 0.00 0.0 - 0.4 k/uL    Basophils Absolute 0.00 0.0 - 0.2 k/uL    Morphology SMUDGE CELLS PRESENT    IGA, Low Range   Result Value Ref Range    IGA, Low Range 26 (L) 70 - 400 mg/dL       IMPRESSION:     1.  CLL (chronic lymphocytic leukemia) ([facility]        Patient Active Problem List   Diagnosis    Mixed hyperlipidemia    Hypothyroidism    Essential hypertension    CLL (chronic lymphocytic leukemia) ([facility]    History of MRSA infection    [facility] ([facility]       PLAN:   Her lab work

## 2020-07-21 ENCOUNTER — OFFICE VISIT (OUTPATIENT)
Dept: PRIMARY CARE CLINIC | Age: 75
End: 2020-07-21
Payer: MEDICARE

## 2020-07-21 VITALS
WEIGHT: 143.2 LBS | DIASTOLIC BLOOD PRESSURE: 72 MMHG | OXYGEN SATURATION: 93 % | TEMPERATURE: 97 F | BODY MASS INDEX: 27.06 KG/M2 | SYSTOLIC BLOOD PRESSURE: 138 MMHG | HEART RATE: 70 BPM

## 2020-07-21 PROCEDURE — 1090F PRES/ABSN URINE INCON ASSESS: CPT | Performed by: NURSE PRACTITIONER

## 2020-07-21 PROCEDURE — G8400 PT W/DXA NO RESULTS DOC: HCPCS | Performed by: NURSE PRACTITIONER

## 2020-07-21 PROCEDURE — G8427 DOCREV CUR MEDS BY ELIG CLIN: HCPCS | Performed by: NURSE PRACTITIONER

## 2020-07-21 PROCEDURE — 4040F PNEUMOC VAC/ADMIN/RCVD: CPT | Performed by: NURSE PRACTITIONER

## 2020-07-21 PROCEDURE — 99213 OFFICE O/P EST LOW 20 MIN: CPT | Performed by: NURSE PRACTITIONER

## 2020-07-21 PROCEDURE — 1036F TOBACCO NON-USER: CPT | Performed by: NURSE PRACTITIONER

## 2020-07-21 PROCEDURE — G8417 CALC BMI ABV UP PARAM F/U: HCPCS | Performed by: NURSE PRACTITIONER

## 2020-07-21 PROCEDURE — 1123F ACP DISCUSS/DSCN MKR DOCD: CPT | Performed by: NURSE PRACTITIONER

## 2020-07-21 ASSESSMENT — ENCOUNTER SYMPTOMS
EYE PAIN: 0
COUGH: 0
SHORTNESS OF BREATH: 0
NAUSEA: 0
EYE REDNESS: 0
CONSTIPATION: 0
DIARRHEA: 0
SINUS PRESSURE: 1
BACK PAIN: 0

## 2020-07-21 NOTE — PROGRESS NOTES
Allergen Reactions    Penicillins Hives, Itching and Swelling    Epinephrine Other (See Comments)     Can tolerate lower doses, but if higher dose: will cause high heart rate. Health Maintenance   Topic Date Due    DTaP/Tdap/Td vaccine (1 - Tdap) 06/[birth year]/1962    Shingles Vaccine (1 of 2) 06/15/1993    Pneumococcal 65+ yrs at Risk Vaccine (2 of 2 - PCV13) 10/27/2010    Flu vaccine (1) 09/01/2020    Lipid screen  10/02/2020    TSH testing  10/02/2020    Annual Wellness Visit (AWV)  10/14/2020    Potassium monitoring  06/02/2021    Creatinine monitoring  06/02/2021    DEXA (modify frequency per FRAX score)  Completed    Hepatitis A vaccine  Aged Out    Hepatitis B vaccine  Aged Out    Hib vaccine  Aged Out    Meningococcal (ACWY) vaccine  Aged Out       Subjective:      Review of Systems   Constitutional: Negative for chills, fatigue and fever. HENT: Positive for sinus pressure (with change in pressure). Negative for congestion and ear pain. Eyes: Negative for pain and redness. Respiratory: Negative for cough and shortness of breath. Cardiovascular: Negative for chest pain, palpitations and leg swelling. Gastrointestinal: Negative for constipation, diarrhea and nausea. Genitourinary: Negative for difficulty urinating and dysuria. Musculoskeletal: Negative for back pain and gait problem. Skin: Negative for rash and wound. Neurological: Negative for dizziness, light-headedness and headaches. Psychiatric/Behavioral: Negative for dysphoric mood and sleep disturbance. The patient is not nervous/anxious. Objective:     /72   Pulse 70   Temp 97 °F (36.1 °C)   Wt 143 lb 3.2 oz (65 kg)   SpO2 93%   BMI 27.06 kg/m²   Physical Exam  Vitals signs and nursing note reviewed. Constitutional:       Appearance: Normal appearance. HENT:      Head: Normocephalic and atraumatic.       Right Ear: Tympanic membrane, ear canal and external ear normal.      Left Ear: Tympanic membrane, ear canal and external ear normal.   Eyes:      Extraocular Movements: Extraocular movements intact. Conjunctiva/sclera: Conjunctivae normal.   Neck:      Musculoskeletal: Normal range of motion and neck supple. Cardiovascular:      Rate and Rhythm: Normal rate and regular rhythm. Heart sounds: Normal heart sounds. Comments: No carotid bruit  Pulmonary:      Effort: Pulmonary effort is normal.      Breath sounds: Normal breath sounds. Abdominal:      General: Bowel sounds are normal.      Palpations: Abdomen is soft. Musculoskeletal: Normal range of motion. Right lower leg: No edema. Left lower leg: No edema. Skin:     General: Skin is warm and dry. Capillary Refill: Capillary refill takes less than 2 seconds. Neurological:      Mental Status: She is alert and oriented to person, place, and time. Cranial Nerves: No cranial nerve deficit. Psychiatric:         Mood and Affect: Mood normal.         Thought Content: Thought content normal.         Judgment: Judgment normal.         Assessment:       Diagnosis Orders   1. Essential hypertension          Plan:    Continue current medication  Continue frequent exercise     Will check with Dr. Almaz Michaud   On Shingles and pneumonia (Prevnar) vaccine  Return in about 6 months (around 1/21/2021) for HTN, Hyperthyroidism. No orders of the defined types were placed in this encounter. No orders of the defined types were placed in this encounter. Patient given educationalmaterials - see patient instructions. Discussed use, benefit, and side effectsof prescribed medications. All patient questions answered. Pt voiced understanding. Reviewed health maintenance. Instructed to continue current medications, diet andexercise. Patient agreed with treatment plan. Follow up as directed.      Electronicallysigned by KIRA Romeo CNP on 7/21/2020 at 5:26 PM

## 2020-10-08 ENCOUNTER — TELEPHONE (OUTPATIENT)
Dept: INFUSION THERAPY | Age: 75
End: 2020-10-08

## 2020-10-08 ENCOUNTER — HOSPITAL ENCOUNTER (OUTPATIENT)
Age: 75
Discharge: HOME | End: 2020-10-08
Payer: MEDICARE

## 2020-10-08 ENCOUNTER — TELEPHONE (OUTPATIENT)
Dept: ONCOLOGY | Age: 75
End: 2020-10-08

## 2020-10-08 DIAGNOSIS — D72.820 LYMPHOCYTOSIS: ICD-10-CM

## 2020-10-08 DIAGNOSIS — C91.10 CLL (CHRONIC LYMPHOCYTIC LEUKEMIA) (HCC): ICD-10-CM

## 2020-10-08 DIAGNOSIS — D80.1 HYPOGAMMAGLOBULINEMIA (HCC): ICD-10-CM

## 2020-10-08 DIAGNOSIS — D69.6 THROMBOCYTOPENIA (HCC): ICD-10-CM

## 2020-10-08 LAB
ABSOLUTE EOS #: 0 K/UL (ref 0–0.4)
ABSOLUTE IMMATURE GRANULOCYTE: ABNORMAL K/UL (ref 0–0.3)
ABSOLUTE LYMPH #: 38.49 K/UL (ref 1–4.8)
ABSOLUTE MONO #: 0.85 K/UL (ref 0.1–0.8)
ALBUMIN SERPL-MCNC: 4.5 G/DL (ref 3.5–5.2)
ALBUMIN/GLOBULIN RATIO: 2.6 (ref 1–2.5)
ALP BLD-CCNC: 69 U/L (ref 35–104)
ALT SERPL-CCNC: 10 U/L (ref 5–33)
ANION GAP SERPL CALCULATED.3IONS-SCNC: 10 MMOL/L (ref 9–17)
AST SERPL-CCNC: 29 U/L
BASOPHILS # BLD: 0 % (ref 0–2)
BASOPHILS ABSOLUTE: 0 K/UL (ref 0–0.2)
BILIRUB SERPL-MCNC: 0.49 MG/DL (ref 0.3–1.2)
BUN BLDV-MCNC: 17 MG/DL (ref 8–23)
BUN/CREAT BLD: ABNORMAL (ref 9–20)
CALCIUM SERPL-MCNC: 9.7 MG/DL (ref 8.6–10.4)
CHLORIDE BLD-SCNC: 104 MMOL/L (ref 98–107)
CO2: 28 MMOL/L (ref 20–31)
CREAT SERPL-MCNC: 0.73 MG/DL (ref 0.5–0.9)
DIFFERENTIAL TYPE: ABNORMAL
EOSINOPHILS RELATIVE PERCENT: 0 % (ref 1–4)
GFR AFRICAN AMERICAN: >60 ML/MIN
GFR NON-AFRICAN AMERICAN: >60 ML/MIN
GFR SERPL CREATININE-BSD FRML MDRD: ABNORMAL ML/MIN/{1.73_M2}
GFR SERPL CREATININE-BSD FRML MDRD: ABNORMAL ML/MIN/{1.73_M2}
GLUCOSE BLD-MCNC: 98 MG/DL (ref 70–99)
HCT VFR BLD CALC: 40 % (ref 36–46)
HEMOGLOBIN: 12.8 G/DL (ref 12–16)
IGA, LOW RANGE: 25 MG/DL (ref 70–400)
IGA: ABNORMAL MG/DL (ref 70–400)
IGG: 461 MG/DL (ref 700–1600)
IGM: <25 MG/DL (ref 40–230)
IMMATURE GRANULOCYTES: ABNORMAL %
LACTATE DEHYDROGENASE: 238 U/L (ref 135–214)
LYMPHOCYTES # BLD: 91 % (ref 24–44)
MCH RBC QN AUTO: 32.5 PG (ref 26–34)
MCHC RBC AUTO-ENTMCNC: 32 G/DL (ref 31–37)
MCV RBC AUTO: 101.5 FL (ref 80–100)
MONOCYTES # BLD: 2 % (ref 1–7)
MORPHOLOGY: ABNORMAL
NRBC AUTOMATED: ABNORMAL PER 100 WBC
PDW BLD-RTO: 13.9 % (ref 12.5–15.4)
PLATELET # BLD: 128 K/UL (ref 140–450)
PLATELET ESTIMATE: ABNORMAL
PMV BLD AUTO: 7.8 FL (ref 6–12)
POTASSIUM SERPL-SCNC: 5.4 MMOL/L (ref 3.7–5.3)
RBC # BLD: 3.94 M/UL (ref 4–5.2)
RBC # BLD: ABNORMAL 10*6/UL
SEG NEUTROPHILS: 7 % (ref 36–66)
SEGMENTED NEUTROPHILS ABSOLUTE COUNT: 2.96 K/UL (ref 1.8–7.7)
SODIUM BLD-SCNC: 142 MMOL/L (ref 135–144)
TOTAL PROTEIN: 6.2 G/DL (ref 6.4–8.3)
WBC # BLD: 42.3 K/UL (ref 3.5–11)
WBC # BLD: ABNORMAL 10*3/UL

## 2020-10-08 PROCEDURE — 83615 LACTATE (LD) (LDH) ENZYME: CPT

## 2020-10-08 PROCEDURE — 82784 ASSAY IGA/IGD/IGG/IGM EACH: CPT

## 2020-10-08 PROCEDURE — 85025 COMPLETE CBC W/AUTO DIFF WBC: CPT

## 2020-10-08 PROCEDURE — 80053 COMPREHEN METABOLIC PANEL: CPT

## 2020-10-08 PROCEDURE — 36415 COLL VENOUS BLD VENIPUNCTURE: CPT

## 2020-10-08 NOTE — TELEPHONE ENCOUNTER
Notified of critical WBC=42.3 from Kaley Gavin in lab. Pt hx. CLL. Has appt 10/14/20 with Dr. Kenia Velez.

## 2020-10-08 NOTE — TELEPHONE ENCOUNTER
Left message for patient to remind of lab work needed prior to upcoming appointment. Advised that if the labs are done a at [facility] the orders are in the chart, or if the labs were already done or orders needed faxed to the lab of their choice to call our office.

## 2020-10-09 ENCOUNTER — TELEPHONE (OUTPATIENT)
Dept: PRIMARY CARE CLINIC | Age: 75
End: 2020-10-09

## 2020-10-09 NOTE — TELEPHONE ENCOUNTER
Patient stated her pcp told her to let him know when she was ready for her order to get blood work. Patient would like to know if this can be done at this time and if she can also have her flu shot at the same time. Patient would like a call and can be reached at [phone].

## 2020-10-09 NOTE — TELEPHONE ENCOUNTER
Thyroid lab and lipids ordered. Looks like Dr. Tanvi Green just had other labs drawn. She may make a nurse appointment to have the flu vaccine and have blood work done a the same time.

## 2020-10-13 ENCOUNTER — HOSPITAL ENCOUNTER (OUTPATIENT)
Age: 75
Setting detail: SPECIMEN
Discharge: HOME | End: 2020-10-13
Payer: MEDICARE

## 2020-10-13 LAB
CHOLESTEROL, FASTING: 188 MG/DL
CHOLESTEROL/HDL RATIO: 2.8
HDLC SERPL-MCNC: 67 MG/DL
LDL CHOLESTEROL: 103 MG/DL (ref 0–130)
TRIGLYCERIDE, FASTING: 90 MG/DL
TSH SERPL DL<=0.05 MIU/L-ACNC: 3.07 MIU/L (ref 0.3–5)
VLDLC SERPL CALC-MCNC: NORMAL MG/DL (ref 1–30)

## 2020-10-14 ENCOUNTER — OFFICE VISIT (OUTPATIENT)
Dept: ONCOLOGY | Age: 75
End: 2020-10-14
Payer: MEDICARE

## 2020-10-14 ENCOUNTER — TELEPHONE (OUTPATIENT)
Dept: ONCOLOGY | Age: 75
End: 2020-10-14

## 2020-10-14 VITALS
DIASTOLIC BLOOD PRESSURE: 81 MMHG | RESPIRATION RATE: 16 BRPM | TEMPERATURE: 97.9 F | SYSTOLIC BLOOD PRESSURE: 153 MMHG | HEART RATE: 68 BPM | BODY MASS INDEX: 28.02 KG/M2 | WEIGHT: 148.3 LBS

## 2020-10-14 PROCEDURE — G8427 DOCREV CUR MEDS BY ELIG CLIN: HCPCS | Performed by: INTERNAL MEDICINE

## 2020-10-14 PROCEDURE — 1036F TOBACCO NON-USER: CPT | Performed by: INTERNAL MEDICINE

## 2020-10-14 PROCEDURE — G8400 PT W/DXA NO RESULTS DOC: HCPCS | Performed by: INTERNAL MEDICINE

## 2020-10-14 PROCEDURE — 4040F PNEUMOC VAC/ADMIN/RCVD: CPT | Performed by: INTERNAL MEDICINE

## 2020-10-14 PROCEDURE — 1090F PRES/ABSN URINE INCON ASSESS: CPT | Performed by: INTERNAL MEDICINE

## 2020-10-14 PROCEDURE — 99211 OFF/OP EST MAY X REQ PHY/QHP: CPT | Performed by: INTERNAL MEDICINE

## 2020-10-14 PROCEDURE — 1123F ACP DISCUSS/DSCN MKR DOCD: CPT | Performed by: INTERNAL MEDICINE

## 2020-10-14 PROCEDURE — G8417 CALC BMI ABV UP PARAM F/U: HCPCS | Performed by: INTERNAL MEDICINE

## 2020-10-14 PROCEDURE — 99214 OFFICE O/P EST MOD 30 MIN: CPT | Performed by: INTERNAL MEDICINE

## 2020-10-14 PROCEDURE — G8484 FLU IMMUNIZE NO ADMIN: HCPCS | Performed by: INTERNAL MEDICINE

## 2020-10-14 NOTE — PROGRESS NOTES
ONCOLOGY NOTE    PCP: KIRA Townsend CNP  Referring Provider: No ref. provider found     DIAGNOSIS:   CLL    CURRENT TREATMENT:   Surveillance    BRIEF CASE HISTORY:   Luis Armando Centeno is a very pleasant 66 y.o. female who is presenting for follow-up for CLL. She was referred to me in 02/2020 for management of CLL/SLL which was diagnosed around September 2004. She had been living in [place], [place] for quite some time. She moved to [facility] in early 2019. She had MRSA infection in 2016. INTERIM HISTORY: The patient presents today for follow up for CLL and to review lab work. She is doing well with no fever, chills, night sweats, recurrent or prolonged infection, or weight loss. She has no new complaints or concerns. During this visit patient's allergy, social, medical, surgical history and medications were reviewed and updated. PAST MEDICAL HISTORY:      Diagnosis Date    CLL (chronic lymphocytic leukemia) ([facility] 2004    Hyperlipidemia     Hypothyroidism     MRSA (methicillin resistant Staphylococcus aureus) infection        PAST SURGICAL HISTORY:      Procedure Laterality Date    BREAST BIOPSY Right        CURRENT MEDICATIONS:   Current Outpatient Medications   Medication Sig Dispense Refill    ramipril (ALTACE) 5 MG capsule Take 1 capsule by mouth daily 90 capsule 3    levothyroxine (SYNTHROID) 88 MCG tablet Take 1 tablet by mouth Daily 90 tablet 3    rosuvastatin (CRESTOR) 5 MG tablet Take 1 tablet by mouth daily 90 tablet 2    loratadine (CLARITIN) 5 MG chewable tablet Take 1 tablet by mouth daily 1 tablet 0     No current facility-administered medications for this visit. Vitals:    10/14/20 1119   BP: (!) 153/81   Pulse: 68   Resp: 16   Temp: 97.9 °F (36.6 °C)     General: No fever or night sweats, Weight is stable.   ENT: No double or blurred vision, no hearing problem, no dysphagia or sore throat   Respiratory: No chest pain, no shortness of breath, no cough or hemoptysis. Cardiovascular: Denies chest pain, PND or orthopnea. No L E swelling or palpitations. Gastrointestinal: No nausea or vomiting, abdominal pain, diarrhea or constipation. Genitourinary: Denies dysuria, hematuria, frequency, urgency or incontinence. Neurological: Denies headaches, decreased LOC, no sensory or motor focal deficits. Musculoskeletal: No arthralgia no back pain or joint swelling. Skin: There are no rashes or bleeding. Psych: Denies hallucinations or intentions to harm self      PHYSICAL EXAM:         Physical Exam  Vitals signs and nursing note reviewed. Constitutional:       General: She is not in acute distress. Appearance: She is well-developed. HENT:      Head: Normocephalic and atraumatic. Eyes:      Pupils: Pupils are equal, round, and reactive to light. Neck:      Musculoskeletal: Neck supple. Cardiovascular:      Rate and Rhythm: Normal rate and regular rhythm. Heart sounds: Normal heart sounds. No murmur. Pulmonary:      Effort: Pulmonary effort is normal. No respiratory distress. Breath sounds: Normal breath sounds. No stridor. No wheezing. Abdominal:      General: Bowel sounds are normal. There is no distension. Palpations: Abdomen is soft. Tenderness: There is no abdominal tenderness. Musculoskeletal: Normal range of motion. Skin:     General: Skin is warm and dry. Findings: No rash. Neurological:      Mental Status: She is alert and oriented to person, place, and time. Cranial Nerves: No cranial nerve deficit.    Psychiatric:         Behavior: Behavior normal.         LABS:   Results for orders placed or performed during the hospital encounter of 10/13/20   Lipid, Fasting   Result Value Ref Range    Cholesterol, Fasting 188 <200 mg/dL    HDL 67 >40 mg/dL    LDL Cholesterol 103 0 - 130 mg/dL    Chol/HDL Ratio 2.8 <5    Triglyceride, Fasting 90 <150 mg/dL    VLDL NOT REPORTED 1 - 30 mg/dL   TSH With Reflex Ft4 Result Value Ref Range    TSH 3.07 0.30 - 5.00 mIU/L     Lab Results   Component Value Date    WBC 42.3 (HH) 10/08/2020    HGB 12.8 10/08/2020    HCT 40.0 10/08/2020    .5 (H) 10/08/2020     (L) 10/08/2020       Chemistry        Component Value Date/Time     10/08/2020 1040    K 5.4 (H) 10/08/2020 1040     10/08/2020 1040    CO2 28 10/08/2020 1040    BUN 17 10/08/2020 1040    CREATININE 0.73 10/08/2020 1040        Component Value Date/Time    CALCIUM 9.7 10/08/2020 1040    ALKPHOS 69 10/08/2020 1040    AST 29 10/08/2020 1040    ALT 10 10/08/2020 1040    BILITOT 0.49 10/08/2020 1040            IMPRESSION:     No diagnosis found. Patient Active Problem List   Diagnosis    Mixed hyperlipidemia    Hypothyroidism    Essential hypertension    [facility]) ([facility]    History of MRSA infection    Thrombocytopenia ([facility])       PLAN:   We discussed her lab work, WBC worsened however it is not an indication for treatment. Platelet count is low but stable. , she is hyperkalemic possibly due to ramipril. Clinically the patient is well with no symptoms. We reivewed her medications, she is not on potassium supplementation, she is on Ramipril which could be factor and we discussed potassium rich food in moderation. Patient's immunoglobulin level, IgG is low but patient denies any recurrent infections. Recommend continued surveillance  Discussed natural history of CLL. Reviewed indications for treatment. Continue surveillance. Patient was counseled on the symptoms and also advised to notify our office if he noted any. We discussed vaccinations, she may not have any live vaccines. Return in 4 months with repeat lab work.        Electronically signed by Al Chong MD on 10/14/2020 at 11:47 AM      This note is created with the assistance of a speech recognition program.  While intending to generate a document that actually reflects the content of the visit, the document can still have some errors including those of syntax and sound a like substitutions which may escape proof reading. It such instances, actual meaning can be extrapolated by contextual diversion.

## 2020-10-14 NOTE — TELEPHONE ENCOUNTER
Per avs, rv in 4 months with labs prior (cbc, cmp, lactate dehydrogenase)appt scheduled for 02/17/2021. Lab orders given to pt to be done prior to rv-pt aware and understood.

## 2020-10-20 ENCOUNTER — NURSE ONLY (OUTPATIENT)
Dept: PRIMARY CARE CLINIC | Age: 75
End: 2020-10-20
Payer: MEDICARE

## 2020-10-20 VITALS — TEMPERATURE: 97.1 F

## 2020-10-20 PROCEDURE — 90694 VACC AIIV4 NO PRSRV 0.5ML IM: CPT | Performed by: FAMILY MEDICINE

## 2020-10-20 PROCEDURE — G0008 ADMIN INFLUENZA VIRUS VAC: HCPCS | Performed by: FAMILY MEDICINE

## 2020-10-20 NOTE — PROGRESS NOTES
After obtaining consent, and per orders of Dr. Naeem Smyth, injection of Fluad given in Left deltoid by Liudmila Bruno. Patient tolerated well.

## 2020-10-30 ENCOUNTER — TELEMEDICINE (OUTPATIENT)
Dept: PRIMARY CARE CLINIC | Age: 75
End: 2020-10-30
Payer: MEDICARE

## 2020-10-30 PROCEDURE — 4040F PNEUMOC VAC/ADMIN/RCVD: CPT | Performed by: NURSE PRACTITIONER

## 2020-10-30 PROCEDURE — 1036F TOBACCO NON-USER: CPT | Performed by: NURSE PRACTITIONER

## 2020-10-30 PROCEDURE — G8484 FLU IMMUNIZE NO ADMIN: HCPCS | Performed by: NURSE PRACTITIONER

## 2020-10-30 PROCEDURE — G8417 CALC BMI ABV UP PARAM F/U: HCPCS | Performed by: NURSE PRACTITIONER

## 2020-10-30 PROCEDURE — G8400 PT W/DXA NO RESULTS DOC: HCPCS | Performed by: NURSE PRACTITIONER

## 2020-10-30 PROCEDURE — 1090F PRES/ABSN URINE INCON ASSESS: CPT | Performed by: NURSE PRACTITIONER

## 2020-10-30 PROCEDURE — 1123F ACP DISCUSS/DSCN MKR DOCD: CPT | Performed by: NURSE PRACTITIONER

## 2020-10-30 PROCEDURE — 99213 OFFICE O/P EST LOW 20 MIN: CPT | Performed by: NURSE PRACTITIONER

## 2020-10-30 PROCEDURE — G8427 DOCREV CUR MEDS BY ELIG CLIN: HCPCS | Performed by: NURSE PRACTITIONER

## 2020-10-30 ASSESSMENT — ENCOUNTER SYMPTOMS
BACK PAIN: 1
CONSTIPATION: 0
COUGH: 0
DIARRHEA: 0

## 2020-10-30 NOTE — PROGRESS NOTES
[facility] PRIMARY CARE  [address]  [facility] [address]  Dept: [phone]    David Augustin is a 66 y.o. female who presents today for her medical conditions/complaintsas noted below. Chief Complaint   Patient presents with    Back Pain     Upper back pain, Pt thinks she pulls a muscle. Pt has been using cold/heat compresses. Pt states she feels better today       HPI:     HPI   Last year pulled upper back muscle while unpacking  She was cleaning closet and feels like the same muscle are painful and spasm  She is doing yoga  She does walking yoga  She golf's  Lifting arms and swinging to right causes pain. Noticed that back muscle were tired and spasm  She tried to exercise it out but did not get better. Then tried alternating heat and ice and feels better  She sneezed and caused spasm and pain  If laying bed good  Worse when standing    She is in her vehicle and she is alone. Provider is in office and student Marie Malone is present. David Augustin is a 66 y.o. female being evaluated by a Virtual Visit (video visit) encounter to address concerns as mentioned above. A caregiver was present when appropriate. Due to this being a TeleHealth encounter (During COKOB-74 public health emergency), evaluation of the following organ systems was limited: Vitals/Constitutional/EENT/Resp/CV/GI//MS/Neuro/Skin/Heme-Lymph-Imm. Pursuant to the emergency declaration under the [facility], [address] [place] [address] and the [employer] Act, this Virtual Visit was conducted with patient's (and/or legal guardian's) consent, to reduce the patient's risk of exposure to COVID-19 and provide necessary medical care.   The patient (and/or legal guardian) has also been advised to contact this office for worsening conditions or problems, and seek emergency medical treatment and/or call 911 if deemed necessary. Patient identification was verified at the start of the visit: Yes    Total time spent for this encounter: Not billed by time    Services were provided through a video synchronous discussion virtually to substitute for in-person clinic visit. Patient and provider were located at their individual homes. --Greig Jeans, APRN - CNP on 10/30/2020 at 10:55 AM    An electronic signature was used to authenticate this note. LDL Cholesterol (mg/dL)   Date Value   10/13/2020 103   10/02/2019 105       (goal LDL is <100)   AST (U/L)   Date Value   10/08/2020 29     ALT (U/L)   Date Value   10/08/2020 10     BUN (mg/dL)   Date Value   10/08/2020 17     BP Readings from Last 3 Encounters:   10/14/20 (!) 153/81   07/21/20 138/72   06/10/20 (!) 145/81          (goal 120/80)    Past Medical History:   Diagnosis Date    [facility]chronic lymphocytic [facility]) ([facility] [address] 2004    Hyperlipidemia     Hypothyroidism     MRSA (methicillin resistant Staphylococcus aureus) infection       Past Surgical History:   Procedure Laterality Date    BREAST BIOPSY Right        Family History   Problem Relation Age of Onset    Dementia Mother     Other Father         CLL       Social History     Tobacco Use    Smoking status: Never Smoker    Smokeless tobacco: Never Used   Substance Use Topics    Alcohol use: Yes     Frequency: 2-4 times a month     Drinks per session: 1 or 2     Binge frequency: Never      Current Outpatient Medications   Medication Sig Dispense Refill    ramipril (ALTACE) 5 MG capsule Take 1 capsule by mouth daily 90 capsule 3    levothyroxine (SYNTHROID) 88 MCG tablet Take 1 tablet by mouth Daily 90 tablet 3    rosuvastatin (CRESTOR) 5 MG tablet Take 1 tablet by mouth daily 90 tablet 2    loratadine (CLARITIN) 5 MG chewable tablet Take 1 tablet by mouth daily 1 tablet 0     No current facility-administered medications for this visit.       Allergies   Allergen Reactions    Penicillins Hives,

## 2020-12-01 ENCOUNTER — TELEPHONE (OUTPATIENT)
Dept: PRIMARY CARE CLINIC | Age: 75
End: 2020-12-01

## 2020-12-01 NOTE — TELEPHONE ENCOUNTER
Pt is having extreme dry mouth and is concerned she may have diabetes. She states [facility] was supposed to contact you regarding this but has not heard anything back.  She has not changed any of her meds or vitamins

## 2020-12-02 ENCOUNTER — OUTSIDE SERVICES (OUTPATIENT)
Dept: PRIMARY CARE CLINIC | Age: 75
End: 2020-12-02

## 2020-12-02 VITALS
OXYGEN SATURATION: 96 % | SYSTOLIC BLOOD PRESSURE: 158 MMHG | TEMPERATURE: 97.6 F | DIASTOLIC BLOOD PRESSURE: 88 MMHG | HEART RATE: 80 BPM

## 2020-12-02 ASSESSMENT — ENCOUNTER SYMPTOMS
SINUS PAIN: 0
EYE PAIN: 0
SINUS PRESSURE: 0
COUGH: 1
EYE REDNESS: 0
DIARRHEA: 0
CONSTIPATION: 0
BACK PAIN: 1
NAUSEA: 0
SHORTNESS OF BREATH: 0

## 2020-12-03 NOTE — PROGRESS NOTES
Timi Eng is a 66 y.o. female being seen for her monthly follow up. Location of visit: [facility] and  patient residence    Visit Date:  12/2/2020       Reason for Visit:    Chief Complaint   Patient presents with    Concern For COVID-19        HPI   She started feeling bad on Nov. 9 with cough, fatigue, and gradually worsened. She was still able to work with PT. Then she felt increase fatigue, chills, fever,  On 11/14 she lost sense of taste and smell and felt worse - she was tested for Covid and it was positive.  also tested positive  She had a fluctuating temperature, Neurological Headache, dry mouth, fatigue, some bowel distress, cough  Dry mouth is still somewhat present but getting better. Energy level is improved  She has CLL    Review of Systems   Constitutional: Positive for fatigue (improved). HENT: Negative for congestion, sinus pressure and sinus pain. Eyes: Negative for pain and redness. Respiratory: Positive for cough. Negative for shortness of breath. Cardiovascular: Negative for chest pain, palpitations and leg swelling. Gastrointestinal: Negative for constipation, diarrhea and nausea. Endocrine: Negative for polydipsia and polyphagia. Genitourinary: Negative for difficulty urinating and dysuria. Musculoskeletal: Positive for back pain. Shoulder pain   Skin: Negative for rash and wound. Neurological: Negative for dizziness, light-headedness and headaches. Psychiatric/Behavioral: Negative for dysphoric mood and sleep disturbance. The patient is not nervous/anxious. Physical Exam  Vitals signs and nursing note reviewed. Constitutional:       General: She is not in acute distress. Appearance: She is well-developed. She is not ill-appearing. HENT:      Head: Normocephalic and atraumatic. Right Ear: External ear normal.      Left Ear: External ear normal.   Neck:      Thyroid: No thyromegaly. Trachea: No tracheal deviation. Cardiovascular:      Rate and Rhythm: Normal rate and regular rhythm. Heart sounds: Normal heart sounds. Pulmonary:      Effort: Pulmonary effort is normal. No respiratory distress. Breath sounds: Normal breath sounds. No wheezing. Abdominal:      General: Bowel sounds are normal.      Palpations: Abdomen is soft. Musculoskeletal:      Right lower leg: No edema. Left lower leg: No edema. Lymphadenopathy:      Cervical: No cervical adenopathy. Skin:     General: Skin is warm. Findings: No rash. Neurological:      Mental Status: She is alert and oriented to person, place, and time. Psychiatric:         Mood and Affect: Mood normal.         Behavior: Behavior normal.         Thought Content: Thought content normal.          Current Outpatient Medications   Medication Sig Dispense Refill    ramipril (ALTACE) 5 MG capsule Take 1 capsule by mouth daily 90 capsule 3    levothyroxine (SYNTHROID) 88 MCG tablet Take 1 tablet by mouth Daily 90 tablet 3    rosuvastatin (CRESTOR) 5 MG tablet Take 1 tablet by mouth daily 90 tablet 2    loratadine (CLARITIN) 5 MG chewable tablet Take 1 tablet by mouth daily 1 tablet 0     No current facility-administered medications for this visit. Allergies   Allergen Reactions    Penicillins Hives, Itching and Swelling    Epinephrine Other (See Comments)     Can tolerate lower doses, but if higher dose: will cause high heart rate. Past Medical History:   Diagnosis Date    CLL (chronic lymphocytic leukemia) ([facility] [address] 2004    Hyperlipidemia     Hypothyroidism     MRSA (methicillin resistant Staphylococcus aureus) infection         ASSESSMENT:  BP (!) 158/88   Pulse 80   Temp 97.6 °F (36.4 °C)   SpO2 96%       Diagnosis Orders   1. COVID-19     2.  Dry mouth          Plan:  Continue to get plenty of rest  Stay hydrated  Resume exercise

## 2020-12-09 ENCOUNTER — TELEPHONE (OUTPATIENT)
Dept: ONCOLOGY | Age: 75
End: 2020-12-09

## 2020-12-09 NOTE — TELEPHONE ENCOUNTER
Patient called in stating that she had tested postitive on 11/14/20 for covid-19. She states she tested negative on 11/28/20. She wanted you aware in case you wanted her to have labs and appointment sooner than her scheduled appointment in Feb 2020. She states to call her if she is to come in sooner than scheduled.

## 2020-12-14 ENCOUNTER — HOSPITAL ENCOUNTER (OUTPATIENT)
Dept: MAMMOGRAPHY | Age: 75
Discharge: HOME | End: 2020-12-16
Payer: MEDICARE

## 2020-12-14 PROCEDURE — 77063 BREAST TOMOSYNTHESIS BI: CPT

## 2021-01-05 ENCOUNTER — TELEPHONE (OUTPATIENT)
Dept: PRIMARY CARE CLINIC | Age: 76
End: 2021-01-05

## 2021-01-05 NOTE — TELEPHONE ENCOUNTER
Pt had a thorough check up in December when she had Covid and she wanted to know if she needed to keep her appt 1/12/2021 with Cinthya Gaytan or if that could be cancelled. Please call pt back either way.

## 2021-01-05 NOTE — TELEPHONE ENCOUNTER
He blood pressure was unusually high the day of her check up. If her blood pressure is normal now she cancel upcoming appointment and schedule medicare wellness visit in a couple months.

## 2021-01-12 ENCOUNTER — OFFICE VISIT (OUTPATIENT)
Dept: PRIMARY CARE CLINIC | Age: 76
End: 2021-01-12
Payer: MEDICARE

## 2021-01-12 VITALS
WEIGHT: 142.8 LBS | BODY MASS INDEX: 26.96 KG/M2 | HEIGHT: 61 IN | DIASTOLIC BLOOD PRESSURE: 82 MMHG | SYSTOLIC BLOOD PRESSURE: 130 MMHG | HEART RATE: 83 BPM | OXYGEN SATURATION: 97 % | TEMPERATURE: 97 F

## 2021-01-12 DIAGNOSIS — D69.6 THROMBOCYTOPENIA (HCC): ICD-10-CM

## 2021-01-12 DIAGNOSIS — I10 ESSENTIAL HYPERTENSION: Primary | ICD-10-CM

## 2021-01-12 DIAGNOSIS — C91.10 CLL (CHRONIC LYMPHOCYTIC LEUKEMIA) (HCC): ICD-10-CM

## 2021-01-12 DIAGNOSIS — D80.1 HYPOGAMMAGLOBULINEMIA (HCC): ICD-10-CM

## 2021-01-12 PROCEDURE — G8400 PT W/DXA NO RESULTS DOC: HCPCS | Performed by: NURSE PRACTITIONER

## 2021-01-12 PROCEDURE — 99213 OFFICE O/P EST LOW 20 MIN: CPT | Performed by: NURSE PRACTITIONER

## 2021-01-12 PROCEDURE — G8484 FLU IMMUNIZE NO ADMIN: HCPCS | Performed by: NURSE PRACTITIONER

## 2021-01-12 PROCEDURE — 1036F TOBACCO NON-USER: CPT | Performed by: NURSE PRACTITIONER

## 2021-01-12 PROCEDURE — 4040F PNEUMOC VAC/ADMIN/RCVD: CPT | Performed by: NURSE PRACTITIONER

## 2021-01-12 PROCEDURE — 1090F PRES/ABSN URINE INCON ASSESS: CPT | Performed by: NURSE PRACTITIONER

## 2021-01-12 PROCEDURE — G8417 CALC BMI ABV UP PARAM F/U: HCPCS | Performed by: NURSE PRACTITIONER

## 2021-01-12 PROCEDURE — 1123F ACP DISCUSS/DSCN MKR DOCD: CPT | Performed by: NURSE PRACTITIONER

## 2021-01-12 PROCEDURE — G8427 DOCREV CUR MEDS BY ELIG CLIN: HCPCS | Performed by: NURSE PRACTITIONER

## 2021-01-12 ASSESSMENT — ENCOUNTER SYMPTOMS
NAUSEA: 0
SHORTNESS OF BREATH: 0
WHEEZING: 0
RHINORRHEA: 0
COUGH: 0
EYE REDNESS: 0
DIARRHEA: 0
VOMITING: 0
EYE DISCHARGE: 0
ABDOMINAL PAIN: 0
SORE THROAT: 0

## 2021-01-12 ASSESSMENT — PATIENT HEALTH QUESTIONNAIRE - PHQ9
SUM OF ALL RESPONSES TO PHQ QUESTIONS 1-9: 0
SUM OF ALL RESPONSES TO PHQ QUESTIONS 1-9: 0

## 2021-01-12 NOTE — PROGRESS NOTES
[facility] PRIMARY CARE  [address]  [facility] [address]  Dept: [phone]    Jacquelin Muñoz is a 66 y.o. female who presents today for her medical conditions/complaintsas noted below.       Chief Complaint   Patient presents with    Hypertension       HPI:     HPI  1/7/2021 She had wellness check and blood pressure check was 167/81  No lightheadedness and dizziness or headache  She is getting the Covid 19 vaccine on 1/29/2021 first dose  Dr. Chanel Umana ordered blood work - next appointment in Feb.      LDL Cholesterol (mg/dL)   Date Value   10/13/2020 103   10/02/2019 105       (goal LDL is <100)   AST (U/L)   Date Value   10/08/2020 29     ALT (U/L)   Date Value   10/08/2020 10     BUN (mg/dL)   Date Value   10/08/2020 17     BP Readings from Last 3 Encounters:   01/12/21 130/82   12/02/20 (!) 158/88   10/14/20 (!) 153/81          (goal 120/80)    Past Medical History:   Diagnosis Date    CLL (chronic lymphocytic leukemia) ([facility] [address] 2004    Hyperlipidemia     Hypothyroidism     MRSA (methicillin resistant Staphylococcus aureus) infection       Past Surgical History:   Procedure Laterality Date    BREAST BIOPSY Right        Family History   Problem Relation Age of Onset    Dementia Mother     Other Father         CLL       Social History     Tobacco Use    Smoking status: Never Smoker    Smokeless tobacco: Never Used   Substance Use Topics    Alcohol use: Yes     Frequency: 2-4 times a month     Drinks per session: 1 or 2     Binge frequency: Never      Current Outpatient Medications   Medication Sig Dispense Refill    ramipril (ALTACE) 5 MG capsule Take 1 capsule by mouth daily 90 capsule 3    levothyroxine (SYNTHROID) 88 MCG tablet Take 1 tablet by mouth Daily 90 tablet 3    rosuvastatin (CRESTOR) 5 MG tablet Take 1 tablet by mouth daily 90 tablet 2    loratadine (CLARITIN) 5 MG chewable tablet Take 1 tablet by mouth daily 1 tablet 0 No current facility-administered medications for this visit. Allergies   Allergen Reactions    Penicillins Hives, Itching and Swelling    Epinephrine Other (See Comments)     Can tolerate lower doses, but if higher dose: will cause high heart rate. Health Maintenance   Topic Date Due    Hepatitis C screen  1945    DTaP/Tdap/Td vaccine (1 - Tdap) 06/15/1962    Shingles Vaccine (1 of 2) 06/15/1993    Pneumococcal 65+ yrs at Risk Vaccine (2 of 2 - PCV13) 10/27/2010    Annual Wellness Visit (AWV)  05/29/2019    Potassium monitoring  10/08/2021    Creatinine monitoring  10/08/2021    Lipid screen  10/13/2021    TSH testing  10/13/2021    DEXA (modify frequency per FRAX score)  Completed    Flu vaccine  Completed    Hepatitis A vaccine  Aged Out    Hepatitis B vaccine  Aged Out    Hib vaccine  Aged Out    Meningococcal (ACWY) vaccine  Aged Out       Subjective:      Review of Systems   Constitutional: Negative for chills and fever. HENT: Negative for rhinorrhea and sore throat. Eyes: Negative for discharge and redness. Respiratory: Negative for cough, shortness of breath and wheezing. Cardiovascular: Negative for chest pain and palpitations. Gastrointestinal: Negative for abdominal pain, diarrhea, nausea and vomiting. Genitourinary: Negative for dysuria and frequency. Musculoskeletal: Negative for arthralgias and myalgias. Neurological: Negative for dizziness, light-headedness and headaches. Psychiatric/Behavioral: Negative for dysphoric mood and sleep disturbance. The patient is not nervous/anxious. Objective:     /82   Pulse 83   Temp 97 °F (36.1 °C)   Ht 5' 0.83\" (1.545 m)   Wt 142 lb 12.8 oz (64.8 kg)   SpO2 97%   BMI 27.14 kg/m²   Physical Exam  Vitals signs and nursing note reviewed. Constitutional:       General: She is not in acute distress. Appearance: She is well-developed. She is not ill-appearing.    HENT: Patient given educationalmaterials - see patient instructions. Discussed use, benefit, and side effectsof prescribed medications. All patient questions answered. Pt voiced understanding. Reviewed health maintenance. Instructed to continue current medications, diet andexercise. Patient agreed with treatment plan. Follow up as directed.      Electronicallysigned by KIRA Leyva CNP on 1/12/2021 at 11:34 AM

## 2021-02-09 ENCOUNTER — HOSPITAL ENCOUNTER (OUTPATIENT)
Age: 76
Discharge: HOME | End: 2021-02-09
Payer: MEDICARE

## 2021-02-09 DIAGNOSIS — E87.5 HYPERKALEMIA: ICD-10-CM

## 2021-02-09 DIAGNOSIS — D80.1 HYPOGAMMAGLOBULINEMIA (HCC): ICD-10-CM

## 2021-02-09 DIAGNOSIS — E03.9 HYPOTHYROIDISM, UNSPECIFIED TYPE: ICD-10-CM

## 2021-02-09 DIAGNOSIS — C91.10 CLL (CHRONIC LYMPHOCYTIC LEUKEMIA) (HCC): ICD-10-CM

## 2021-02-09 DIAGNOSIS — D69.6 THROMBOCYTOPENIA (HCC): ICD-10-CM

## 2021-02-09 LAB
ABSOLUTE EOS #: 0.36 K/UL (ref 0–0.4)
ABSOLUTE IMMATURE GRANULOCYTE: ABNORMAL K/UL (ref 0–0.3)
ABSOLUTE LYMPH #: 32.22 K/UL (ref 1–4.8)
ABSOLUTE MONO #: 0.36 K/UL (ref 0.1–1.2)
ALBUMIN SERPL-MCNC: 4.2 G/DL (ref 3.5–5.2)
ALBUMIN/GLOBULIN RATIO: 1.8 (ref 1–2.5)
ALP BLD-CCNC: 85 U/L (ref 35–104)
ALT SERPL-CCNC: 11 U/L (ref 5–33)
ANION GAP SERPL CALCULATED.3IONS-SCNC: 8 MMOL/L (ref 9–17)
AST SERPL-CCNC: 24 U/L
BASOPHILS # BLD: 1 % (ref 0–2)
BASOPHILS ABSOLUTE: 0.36 K/UL (ref 0–0.2)
BILIRUB SERPL-MCNC: 0.66 MG/DL (ref 0.3–1.2)
BUN BLDV-MCNC: 14 MG/DL (ref 8–23)
BUN/CREAT BLD: ABNORMAL (ref 9–20)
CALCIUM SERPL-MCNC: 9.5 MG/DL (ref 8.6–10.4)
CHLORIDE BLD-SCNC: 103 MMOL/L (ref 98–107)
CO2: 30 MMOL/L (ref 20–31)
CREAT SERPL-MCNC: 0.75 MG/DL (ref 0.5–0.9)
DIFFERENTIAL TYPE: ABNORMAL
EOSINOPHILS RELATIVE PERCENT: 1 % (ref 1–4)
GFR AFRICAN AMERICAN: >60 ML/MIN
GFR NON-AFRICAN AMERICAN: >60 ML/MIN
GFR SERPL CREATININE-BSD FRML MDRD: ABNORMAL ML/MIN/{1.73_M2}
GFR SERPL CREATININE-BSD FRML MDRD: ABNORMAL ML/MIN/{1.73_M2}
GLUCOSE BLD-MCNC: 93 MG/DL (ref 70–99)
HCT VFR BLD CALC: 40.2 % (ref 36–46)
HEMOGLOBIN: 12.9 G/DL (ref 12–16)
IMMATURE GRANULOCYTES: ABNORMAL %
LACTATE DEHYDROGENASE: 290 U/L (ref 135–214)
LYMPHOCYTES # BLD: 89 % (ref 24–44)
MCH RBC QN AUTO: 31.9 PG (ref 26–34)
MCHC RBC AUTO-ENTMCNC: 32.1 G/DL (ref 31–37)
MCV RBC AUTO: 99.3 FL (ref 80–100)
MONOCYTES # BLD: 1 % (ref 2–11)
MORPHOLOGY: ABNORMAL
NRBC AUTOMATED: ABNORMAL PER 100 WBC
PDW BLD-RTO: 14.8 % (ref 12.5–15.4)
PLATELET # BLD: 133 K/UL (ref 140–450)
PLATELET ESTIMATE: ABNORMAL
PMV BLD AUTO: 6.7 FL (ref 6–12)
POTASSIUM SERPL-SCNC: 4.9 MMOL/L (ref 3.7–5.3)
RBC # BLD: 4.04 M/UL (ref 4–5.2)
RBC # BLD: ABNORMAL 10*6/UL
SEG NEUTROPHILS: 8 % (ref 36–66)
SEGMENTED NEUTROPHILS ABSOLUTE COUNT: 2.9 K/UL (ref 1.8–7.7)
SODIUM BLD-SCNC: 141 MMOL/L (ref 135–144)
TOTAL PROTEIN: 6.5 G/DL (ref 6.4–8.3)
WBC # BLD: 36.2 K/UL (ref 3.5–11)
WBC # BLD: ABNORMAL 10*3/UL

## 2021-02-09 PROCEDURE — 36415 COLL VENOUS BLD VENIPUNCTURE: CPT

## 2021-02-09 PROCEDURE — 83615 LACTATE (LD) (LDH) ENZYME: CPT

## 2021-02-09 PROCEDURE — 80053 COMPREHEN METABOLIC PANEL: CPT

## 2021-02-09 PROCEDURE — 85025 COMPLETE CBC W/AUTO DIFF WBC: CPT

## 2021-03-02 ENCOUNTER — TELEPHONE (OUTPATIENT)
Dept: PRIMARY CARE CLINIC | Age: 76
End: 2021-03-02

## 2021-03-24 ENCOUNTER — TELEPHONE (OUTPATIENT)
Dept: ONCOLOGY | Age: 76
End: 2021-03-24

## 2021-03-24 ENCOUNTER — OFFICE VISIT (OUTPATIENT)
Dept: ONCOLOGY | Age: 76
End: 2021-03-24
Payer: MEDICARE

## 2021-03-24 VITALS
DIASTOLIC BLOOD PRESSURE: 80 MMHG | RESPIRATION RATE: 16 BRPM | BODY MASS INDEX: 26.74 KG/M2 | TEMPERATURE: 97.8 F | SYSTOLIC BLOOD PRESSURE: 138 MMHG | HEART RATE: 71 BPM | WEIGHT: 140.7 LBS

## 2021-03-24 DIAGNOSIS — C91.10 CLL (CHRONIC LYMPHOCYTIC LEUKEMIA) (HCC): Primary | ICD-10-CM

## 2021-03-24 DIAGNOSIS — D69.6 THROMBOCYTOPENIA (HCC): ICD-10-CM

## 2021-03-24 DIAGNOSIS — D80.1 HYPOGAMMAGLOBULINEMIA (HCC): ICD-10-CM

## 2021-03-24 DIAGNOSIS — D72.820 LYMPHOCYTOSIS: ICD-10-CM

## 2021-03-24 DIAGNOSIS — E03.9 HYPOTHYROIDISM, UNSPECIFIED TYPE: ICD-10-CM

## 2021-03-24 PROCEDURE — 1036F TOBACCO NON-USER: CPT | Performed by: INTERNAL MEDICINE

## 2021-03-24 PROCEDURE — G8484 FLU IMMUNIZE NO ADMIN: HCPCS | Performed by: INTERNAL MEDICINE

## 2021-03-24 PROCEDURE — 1090F PRES/ABSN URINE INCON ASSESS: CPT | Performed by: INTERNAL MEDICINE

## 2021-03-24 PROCEDURE — G8417 CALC BMI ABV UP PARAM F/U: HCPCS | Performed by: INTERNAL MEDICINE

## 2021-03-24 PROCEDURE — 4040F PNEUMOC VAC/ADMIN/RCVD: CPT | Performed by: INTERNAL MEDICINE

## 2021-03-24 PROCEDURE — 99214 OFFICE O/P EST MOD 30 MIN: CPT | Performed by: INTERNAL MEDICINE

## 2021-03-24 PROCEDURE — 99211 OFF/OP EST MAY X REQ PHY/QHP: CPT | Performed by: INTERNAL MEDICINE

## 2021-03-24 PROCEDURE — G8400 PT W/DXA NO RESULTS DOC: HCPCS | Performed by: INTERNAL MEDICINE

## 2021-03-24 PROCEDURE — G8427 DOCREV CUR MEDS BY ELIG CLIN: HCPCS | Performed by: INTERNAL MEDICINE

## 2021-03-24 PROCEDURE — 1123F ACP DISCUSS/DSCN MKR DOCD: CPT | Performed by: INTERNAL MEDICINE

## 2021-03-24 NOTE — PROGRESS NOTES
ONCOLOGY NOTE    PCP: KIRA Santoyo CNP  Referring Provider: No ref. provider found     DIAGNOSIS:   CLL    CURRENT TREATMENT:   Active surveillance    BRIEF CASE HISTORY:   Shaun Song is a very pleasant 66 y.o. female who is presenting for follow-up for CLL. She was referred to me in 02/2020 for management of CLL/SLL which was diagnosed around September 2004. She had been living in [place] for quite some time. She moved to [facility] in early 2019. She had MRSA infection in 2016. INTERIM HISTORY: The patient presents today for follow up for CLL and to review lab work. She reports she had COVID infection 69/3168 with no complications and has been fully vaccinated since. She is well recovered and is back to baseline and remains active. She is feeling well currently with no symptoms. During this visit patient's allergy, social, medical, surgical history and medications were reviewed and updated. PAST MEDICAL HISTORY:      Diagnosis Date    CLL (chronic lymphocytic leukemia) ([facility] [address] 2004    Hyperlipidemia     Hypothyroidism     MRSA (methicillin resistant Staphylococcus aureus) infection        PAST SURGICAL HISTORY:      Procedure Laterality Date    BREAST BIOPSY Right        CURRENT MEDICATIONS:   Current Outpatient Medications   Medication Sig Dispense Refill    ramipril (ALTACE) 5 MG capsule Take 1 capsule by mouth daily 90 capsule 3    levothyroxine (SYNTHROID) 88 MCG tablet Take 1 tablet by mouth Daily 90 tablet 3    rosuvastatin (CRESTOR) 5 MG tablet Take 1 tablet by mouth daily 90 tablet 2    loratadine (CLARITIN) 5 MG chewable tablet Take 1 tablet by mouth daily 1 tablet 0     No current facility-administered medications for this visit. Vitals:    03/24/21 1009   BP: 138/80   Pulse: 71   Resp: 16   Temp: 97.8 °F (36.6 °C)     General: No fever or night sweats, Weight is stable.   ENT: No double or blurred vision, no hearing problem, no dysphagia or sore throat   Respiratory: No chest pain, no shortness of breath, no cough or hemoptysis. Cardiovascular: Denies chest pain, PND or orthopnea. No L E swelling or palpitations. Gastrointestinal: No nausea or vomiting, abdominal pain, diarrhea or constipation. Genitourinary: Denies dysuria, hematuria, frequency, urgency or incontinence. Neurological: Denies headaches, decreased LOC, no sensory or motor focal deficits. Musculoskeletal: No arthralgia no back pain or joint swelling. Skin: There are no rashes or bleeding. Psych: Denies hallucinations or intentions to harm self      PHYSICAL EXAM:         Physical Exam  Vitals signs and nursing note reviewed. Constitutional:       General: She is not in acute distress. Appearance: She is well-developed. HENT:      Head: Normocephalic and atraumatic. Eyes:      Pupils: Pupils are equal, round, and reactive to light. Neck:      Musculoskeletal: Neck supple. Cardiovascular:      Rate and Rhythm: Normal rate and regular rhythm. Heart sounds: Normal heart sounds. No murmur. Pulmonary:      Effort: Pulmonary effort is normal. No respiratory distress. Breath sounds: Normal breath sounds. No stridor. No wheezing. Abdominal:      General: Bowel sounds are normal. There is no distension. Palpations: Abdomen is soft. Tenderness: There is no abdominal tenderness. Musculoskeletal: Normal range of motion. Skin:     General: Skin is warm and dry. Findings: No rash. Neurological:      Mental Status: She is alert and oriented to person, place, and time. Cranial Nerves: No cranial nerve deficit.    Psychiatric:         Behavior: Behavior normal.         LABS:   Results for orders placed or performed during the hospital encounter of 02/09/21   Lactate Dehydrogenase   Result Value Ref Range     (H) 135 - 214 U/L   Comprehensive Metabolic Panel   Result Value Ref Range    Glucose 93 70 - 99 mg/dL    BUN 14 8 - 23 mg/dL CREATININE 0.75 0.50 - 0.90 mg/dL    Bun/Cre Ratio NOT REPORTED 9 - 20    Calcium 9.5 8.6 - 10.4 mg/dL    Sodium 141 135 - 144 mmol/L    Potassium 4.9 3.7 - 5.3 mmol/L    Chloride 103 98 - 107 mmol/L    CO2 30 20 - 31 mmol/L    Anion Gap 8 (L) 9 - 17 mmol/L    Alkaline Phosphatase 85 35 - 104 U/L    ALT 11 5 - 33 U/L    AST 24 <32 U/L    Total Bilirubin 0.66 0.3 - 1.2 mg/dL    Total Protein 6.5 6.4 - 8.3 g/dL    Albumin 4.2 3.5 - 5.2 g/dL    Albumin/Globulin Ratio 1.8 1.0 - 2.5    GFR Non-African American >60 >60 mL/min    GFR African American >60 >60 mL/min    GFR Comment          GFR Staging NOT REPORTED    CBC Auto Differential   Result Value Ref Range    WBC 36.2 (HH) 3.5 - 11.0 k/uL    RBC 4.04 4.0 - 5.2 m/uL    Hemoglobin 12.9 12.0 - 16.0 g/dL    Hematocrit 40.2 36 - 46 %    MCV 99.3 80 - 100 fL    MCH 31.9 26 - 34 pg    MCHC 32.1 31 - 37 g/dL    RDW 14.8 12.5 - 15.4 %    Platelets 014 (L) 110 - 450 k/uL    MPV 6.7 6.0 - 12.0 fL    NRBC Automated NOT REPORTED per 100 WBC    Differential Type NOT REPORTED     Immature Granulocytes NOT REPORTED 0 %    Absolute Immature Granulocyte NOT REPORTED 0.00 - 0.30 k/uL    WBC Morphology NOT REPORTED     RBC Morphology NOT REPORTED     Platelet Estimate NOT REPORTED     Seg Neutrophils 8 (L) 36 - 66 %    Lymphocytes 89 (H) 24 - 44 %    Monocytes 1 (L) 2 - 11 %    Eosinophils % 1 1 - 4 %    Basophils 1 0 - 2 %    Segs Absolute 2.90 1.8 - 7.7 k/uL    Absolute Lymph # 32.22 (H) 1.0 - 4.8 k/uL    Absolute Mono # 0.36 0.1 - 1.2 k/uL    Absolute Eos # 0.36 0.0 - 0.4 k/uL    Basophils Absolute 0.36 (H) 0.0 - 0.2 k/uL    Morphology SMUDGE CELLS PRESENT      IMPRESSION:     No diagnosis found.     Patient Active Problem List   Diagnosis    Mixed hyperlipidemia    Hypothyroidism    Essential hypertension    [facility]ocytic [facility]) ([facility] [address]    History of MRSA infection    Thrombocytopenia ([facility] [address]    [facility]       PLAN:   We discussed her COVID infection and subsequent vaccination, she is fully recovered and has resumed active lifestyle. Her lab work was reviewed and discussed, her leukocytosis persists with CLL but remains stable, counts are in range otherwise. Clinically she is doing very well with no symptoms and her disease remains stable. Reviewed indication for treatment for CLL. Platelet count is on the lower side but adequate. Reviewed goals and expectations. Patient wishes to pursue continued surveillance. Immunoglobulin level is low but patient denies recurrent infections. We will continue to monitor. we will continue with surveillance as per the guidelines. Return in 4 months. Electronically signed by Trini Godinez MD on 3/24/2021 at 10:20 AM      This note is created with the assistance of a speech recognition program.  While intending to generate a document that actually reflects the content of the visit, the document can still have some errors including those of syntax and sound a like substitutions which may escape proof reading. It such instances, actual meaning can be extrapolated by contextual diversion.

## 2021-03-24 NOTE — TELEPHONE ENCOUNTER
AVS from 3/24/21     rv in 4 months with labs couple of days prior     RV scheduled 7/21/21 @ 10:30am    PT was given AVS and an appt schedule    Electronically signed by Gilles Reyna on 3/24/2021 at 11:21 AM

## 2021-05-03 DIAGNOSIS — E03.9 HYPOTHYROIDISM, UNSPECIFIED TYPE: ICD-10-CM

## 2021-05-03 RX ORDER — LEVOTHYROXINE SODIUM 88 UG/1
88 TABLET ORAL DAILY
Qty: 90 TABLET | Refills: 3 | Status: SHIPPED | OUTPATIENT
Start: 2021-05-03 | End: 2022-04-25 | Stop reason: SDUPTHER

## 2021-06-07 RX ORDER — RAMIPRIL 5 MG/1
5 CAPSULE ORAL DAILY
Qty: 90 CAPSULE | Refills: 3 | Status: SHIPPED | OUTPATIENT
Start: 2021-06-07 | End: 2022-05-23

## 2021-07-08 ENCOUNTER — HOSPITAL ENCOUNTER (OUTPATIENT)
Age: 76
Discharge: HOME | End: 2021-07-08
Payer: MEDICARE

## 2021-07-08 DIAGNOSIS — C91.10 CLL (CHRONIC LYMPHOCYTIC LEUKEMIA) (HCC): ICD-10-CM

## 2021-07-08 DIAGNOSIS — D80.1 HYPOGAMMAGLOBULINEMIA (HCC): ICD-10-CM

## 2021-07-08 DIAGNOSIS — D69.6 THROMBOCYTOPENIA (HCC): ICD-10-CM

## 2021-07-08 DIAGNOSIS — E03.9 HYPOTHYROIDISM, UNSPECIFIED TYPE: ICD-10-CM

## 2021-07-08 LAB
ABSOLUTE EOS #: 0 K/UL (ref 0–0.4)
ABSOLUTE IMMATURE GRANULOCYTE: ABNORMAL K/UL (ref 0–0.3)
ABSOLUTE LYMPH #: 32.12 K/UL (ref 1–4.8)
ABSOLUTE MONO #: 0.77 K/UL (ref 0.1–0.8)
ALBUMIN SERPL-MCNC: 4.6 G/DL (ref 3.5–5.2)
ALBUMIN/GLOBULIN RATIO: 2.7 (ref 1–2.5)
ALP BLD-CCNC: 75 U/L (ref 35–104)
ALT SERPL-CCNC: 10 U/L (ref 5–33)
ANION GAP SERPL CALCULATED.3IONS-SCNC: 10 MMOL/L (ref 9–17)
AST SERPL-CCNC: 27 U/L
ATYPICAL LYMPHOCYTE ABSOLUTE COUNT: 2.71 K/UL
ATYPICAL LYMPHOCYTES: 7 %
BASOPHILS # BLD: 0 % (ref 0–2)
BASOPHILS ABSOLUTE: 0 K/UL (ref 0–0.2)
BILIRUB SERPL-MCNC: 0.66 MG/DL (ref 0.3–1.2)
BUN BLDV-MCNC: 12 MG/DL (ref 8–23)
BUN/CREAT BLD: ABNORMAL (ref 9–20)
CALCIUM SERPL-MCNC: 9.7 MG/DL (ref 8.6–10.4)
CHLORIDE BLD-SCNC: 103 MMOL/L (ref 98–107)
CO2: 28 MMOL/L (ref 20–31)
CREAT SERPL-MCNC: 0.82 MG/DL (ref 0.5–0.9)
DIFFERENTIAL TYPE: ABNORMAL
EOSINOPHILS RELATIVE PERCENT: 0 % (ref 1–4)
GFR AFRICAN AMERICAN: >60 ML/MIN
GFR NON-AFRICAN AMERICAN: >60 ML/MIN
GFR SERPL CREATININE-BSD FRML MDRD: ABNORMAL ML/MIN/{1.73_M2}
GFR SERPL CREATININE-BSD FRML MDRD: ABNORMAL ML/MIN/{1.73_M2}
GLUCOSE BLD-MCNC: 88 MG/DL (ref 70–99)
HCT VFR BLD CALC: 41.4 % (ref 36–46)
HEMOGLOBIN: 13.1 G/DL (ref 12–16)
IMMATURE GRANULOCYTES: ABNORMAL %
LACTATE DEHYDROGENASE: 240 U/L (ref 135–214)
LYMPHOCYTES # BLD: 83 % (ref 24–44)
MCH RBC QN AUTO: 31.5 PG (ref 26–34)
MCHC RBC AUTO-ENTMCNC: 31.7 G/DL (ref 31–37)
MCV RBC AUTO: 99.4 FL (ref 80–100)
MONOCYTES # BLD: 2 % (ref 1–7)
MORPHOLOGY: ABNORMAL
NRBC AUTOMATED: ABNORMAL PER 100 WBC
PDW BLD-RTO: 14.5 % (ref 12.5–15.4)
PLATELET # BLD: 129 K/UL (ref 140–450)
PLATELET ESTIMATE: ABNORMAL
PMV BLD AUTO: 7.3 FL (ref 6–12)
POTASSIUM SERPL-SCNC: 4.6 MMOL/L (ref 3.7–5.3)
RBC # BLD: 4.17 M/UL (ref 4–5.2)
RBC # BLD: ABNORMAL 10*6/UL
SEG NEUTROPHILS: 8 % (ref 36–66)
SEGMENTED NEUTROPHILS ABSOLUTE COUNT: 3.1 K/UL (ref 1.8–7.7)
SODIUM BLD-SCNC: 141 MMOL/L (ref 135–144)
TOTAL PROTEIN: 6.3 G/DL (ref 6.4–8.3)
WBC # BLD: 38.7 K/UL (ref 3.5–11)
WBC # BLD: ABNORMAL 10*3/UL

## 2021-07-08 PROCEDURE — 83615 LACTATE (LD) (LDH) ENZYME: CPT

## 2021-07-08 PROCEDURE — 80053 COMPREHEN METABOLIC PANEL: CPT

## 2021-07-08 PROCEDURE — 85025 COMPLETE CBC W/AUTO DIFF WBC: CPT

## 2021-07-08 PROCEDURE — 36415 COLL VENOUS BLD VENIPUNCTURE: CPT

## 2021-07-13 ENCOUNTER — OFFICE VISIT (OUTPATIENT)
Dept: PRIMARY CARE CLINIC | Age: 76
End: 2021-07-13
Payer: MEDICARE

## 2021-07-13 VITALS
DIASTOLIC BLOOD PRESSURE: 86 MMHG | SYSTOLIC BLOOD PRESSURE: 130 MMHG | HEART RATE: 68 BPM | OXYGEN SATURATION: 96 % | BODY MASS INDEX: 27.14 KG/M2 | WEIGHT: 142.8 LBS

## 2021-07-13 DIAGNOSIS — Z13.220 ENCOUNTER FOR LIPID SCREENING FOR CARDIOVASCULAR DISEASE: ICD-10-CM

## 2021-07-13 DIAGNOSIS — Z13.6 ENCOUNTER FOR LIPID SCREENING FOR CARDIOVASCULAR DISEASE: ICD-10-CM

## 2021-07-13 DIAGNOSIS — I10 ESSENTIAL HYPERTENSION: ICD-10-CM

## 2021-07-13 DIAGNOSIS — Z00.00 ROUTINE GENERAL MEDICAL EXAMINATION AT A HEALTH CARE FACILITY: Primary | ICD-10-CM

## 2021-07-13 DIAGNOSIS — E03.9 HYPOTHYROIDISM, UNSPECIFIED TYPE: ICD-10-CM

## 2021-07-13 PROCEDURE — 1123F ACP DISCUSS/DSCN MKR DOCD: CPT | Performed by: NURSE PRACTITIONER

## 2021-07-13 PROCEDURE — 4040F PNEUMOC VAC/ADMIN/RCVD: CPT | Performed by: NURSE PRACTITIONER

## 2021-07-13 PROCEDURE — G0439 PPPS, SUBSEQ VISIT: HCPCS | Performed by: NURSE PRACTITIONER

## 2021-07-13 SDOH — ECONOMIC STABILITY: FOOD INSECURITY: WITHIN THE PAST 12 MONTHS, YOU WORRIED THAT YOUR FOOD WOULD RUN OUT BEFORE YOU GOT MONEY TO BUY MORE.: NEVER TRUE

## 2021-07-13 SDOH — ECONOMIC STABILITY: FOOD INSECURITY: WITHIN THE PAST 12 MONTHS, THE FOOD YOU BOUGHT JUST DIDN'T LAST AND YOU DIDN'T HAVE MONEY TO GET MORE.: NEVER TRUE

## 2021-07-13 ASSESSMENT — PATIENT HEALTH QUESTIONNAIRE - PHQ9
2. FEELING DOWN, DEPRESSED OR HOPELESS: 0
1. LITTLE INTEREST OR PLEASURE IN DOING THINGS: 0
SUM OF ALL RESPONSES TO PHQ QUESTIONS 1-9: 0
SUM OF ALL RESPONSES TO PHQ QUESTIONS 1-9: 0
SUM OF ALL RESPONSES TO PHQ9 QUESTIONS 1 & 2: 0
SUM OF ALL RESPONSES TO PHQ QUESTIONS 1-9: 0

## 2021-07-13 ASSESSMENT — SOCIAL DETERMINANTS OF HEALTH (SDOH): HOW HARD IS IT FOR YOU TO PAY FOR THE VERY BASICS LIKE FOOD, HOUSING, MEDICAL CARE, AND HEATING?: NOT HARD AT ALL

## 2021-07-13 NOTE — PROGRESS NOTES
Medicare Annual Wellness Visit  Name: Gail Sevilla Date: 2021   MRN: [id number] Sex: Female   Age: 64 y.o. Ethnicity: Non- / Non    : 1945 Race: White (non-)      Navarro Keys is here for Medicare AWV    Screenings for behavioral, psychosocial and functional/safety risks, and cognitive dysfunction are all negative except as indicated below. These results, as well as other patient data from the [address] form, are documented in Flowsheets linked to this Encounter. Allergies   Allergen Reactions    Penicillins Hives, Itching and Swelling    Epinephrine Other (See Comments)     Can tolerate lower doses, but if higher dose: will cause high heart rate. Prior to Visit Medications    Medication Sig Taking?  Authorizing Provider   ramipril (ALTACE) 5 MG capsule Take 1 capsule by mouth daily Yes KIRA Donis CNP   levothyroxine (SYNTHROID) 88 MCG tablet Take 1 tablet by mouth Daily Yes KIRA Donis CNP   rosuvastatin (CRESTOR) 5 MG tablet Take 1 tablet by mouth daily Yes KIRA Bustillos CNP         Past Medical History:   Diagnosis Date    CLL [facility]chronic lymphocytic [facility]) ([facility] [address] 2004    Hyperlipidemia     Hypothyroidism     MRSA (methicillin resistant Staphylococcus aureus) infection        Past Surgical History:   Procedure Laterality Date    BREAST BIOPSY Right          Family History   Problem Relation Age of Onset    Dementia Mother     Other Father         CLL       CareTeam (Including outside providers/suppliers regularly involved in providing care):   Patient Care Team:  KIRA Donis CNP as PCP - General (Family Nurse Practitioner)  KIRA Donis CNP as PCP - [facility] Empaneled Provider    Wt Readings from Last 3 Encounters:   21 142 lb 9.6 oz (64.7 kg)   21 142 lb 12.8 oz (64.8 kg)   21 140 lb 11.2 oz (63.8 kg)     Vitals:    21 1154   BP: 130/86   Pulse: 68   SpO2: 96%   Weight: 142 lb 12.8 oz (64.8 kg)     Body mass index is 27.14 kg/m². Based upon direct observation of the patient, evaluation of cognition reveals recent and remote memory intact. General Appearance: alert and oriented to person, place and time, well-developed and well-nourished, in no acute distress  Skin: warm and dry, no rash or erythema  Head: normocephalic and atraumatic  Eyes: pupils equal, round, and reactive to light, extraocular eye movements intact, conjunctivae normal  ENT: tympanic membrane, external ear and ear canal normal bilaterally, oropharynx clear and moist with normal mucous membranes  Neck: neck supple and non tender without mass, no thyromegaly or thyroid nodules, no cervical lymphadenopathy   Pulmonary/Chest: clear to auscultation bilaterally- no wheezes, rales or rhonchi, normal air movement, no respiratory distress  Cardiovascular: normal rate, normal S1 and S2, no gallops and no carotid bruits  Abdomen: soft, non-tender, non-distended, normal bowel sounds, no masses or organomegaly  Extremities: no cyanosis, no clubbing and no edema  Musculoskeletal: normal range of motion, no joint swelling, deformity or tenderness  Neurologic: gait and coordination normal and speech normal    Patient's complete Health Risk Assessment and screening values have been reviewed and are found in Flowsheets. The following problems were reviewed today and where indicated follow up appointments were made and/or referrals ordered. Positive Risk Factor Screenings with Interventions:      Cognitive:  Clock Drawing Test (CDT) Score: Normal  Total Score Interpretation: Positive Mini-Cog  Cognitive Impairment Interventions:  · She has no cognitive impairment         General Health and ACP:  General  In general, how would you say your health is?: Very Good  In the past 7 days, have you experienced any of the following?  New or Increased Pain, New or Increased Fatigue, Loneliness, Social Isolation, Stress or Anger?: None of These  Do you get the social and emotional support that you need?: Yes  Do you have a Living Will?: Yes  Advance Directives     Power of [facility] Will ACP-Advance Directive ACP-Power of     Not on File Not on File Not on File Not on File      General Health Risk Interventions:  · Patient is up-to-date with health maintenance, has living will in place, no focal concerns. Personalized Preventive Plan   Current Health Maintenance Status  Immunization History   Administered Date(s) Administered    COVID-19, Mio Wheatley PF, 30mcg/0.3mL 01/29/2021, 02/19/2021    Influenza, High Dose (Fluzone 65 yrs and older) 12/29/2011, 10/17/2013, 10/25/2014, 10/01/2015, 09/14/2016, 10/15/2017, 09/30/2018    Influenza, Quadv, adjuvanted, 65 yrs +, IM, PF (Fluad) 10/20/2020    Influenza, Triv, inactivated, subunit, adjuvanted, IM (Fluad 65 yrs and older) 10/14/2019    Pneumococcal Polysaccharide ([id number]) 10/27/2009        Health Maintenance   Topic Date Due    Hepatitis C screen  Never done    DTaP/Tdap/Td vaccine (1 - Tdap) Never done    Shingles Vaccine (1 of 2) Never done    Pneumococcal 65+ yrs at Risk Vaccine (2 of 2 - PCV13) 10/27/2010    COVID-19 Vaccine (3 - Pfizer risk 3-dose series) 03/19/2021    Flu vaccine (1) 09/01/2021    Lipid screen  10/13/2021    TSH testing  10/13/2021    Potassium monitoring  07/08/2022    Creatinine monitoring  07/08/2022    Annual Wellness Visit (AWV)  07/14/2022    DEXA (modify frequency per FRAX score)  Completed    Hepatitis A vaccine  Aged Out    Hepatitis B vaccine  Aged Out    Hib vaccine  Aged Out    Meningococcal (ACWY) vaccine  Aged Out     Recommendations for [employer] Due: see orders and patient instructions/AVS.  . Recommended screening schedule for the next 5-10 years is provided to the patient in written form: see Patient Instructions/AVS.    Phoenix Loyola was seen today for medicare awv.     Diagnoses and all orders for this visit:    Routine general medical examination at a health care facility

## 2021-07-21 ENCOUNTER — TELEPHONE (OUTPATIENT)
Dept: ONCOLOGY | Age: 76
End: 2021-07-21

## 2021-07-21 ENCOUNTER — OFFICE VISIT (OUTPATIENT)
Dept: ONCOLOGY | Age: 76
End: 2021-07-21
Payer: MEDICARE

## 2021-07-21 VITALS
RESPIRATION RATE: 16 BRPM | TEMPERATURE: 97.6 F | BODY MASS INDEX: 27.1 KG/M2 | WEIGHT: 142.6 LBS | SYSTOLIC BLOOD PRESSURE: 146 MMHG | DIASTOLIC BLOOD PRESSURE: 82 MMHG | HEART RATE: 63 BPM

## 2021-07-21 DIAGNOSIS — C91.10 CLL (CHRONIC LYMPHOCYTIC LEUKEMIA) (HCC): Primary | ICD-10-CM

## 2021-07-21 DIAGNOSIS — R74.02 ELEVATED LDH: ICD-10-CM

## 2021-07-21 DIAGNOSIS — D69.6 THROMBOCYTOPENIA (HCC): ICD-10-CM

## 2021-07-21 DIAGNOSIS — D80.1 HYPOGAMMAGLOBULINEMIA (HCC): ICD-10-CM

## 2021-07-21 PROCEDURE — 99211 OFF/OP EST MAY X REQ PHY/QHP: CPT | Performed by: INTERNAL MEDICINE

## 2021-07-21 PROCEDURE — 4040F PNEUMOC VAC/ADMIN/RCVD: CPT | Performed by: INTERNAL MEDICINE

## 2021-07-21 PROCEDURE — 1090F PRES/ABSN URINE INCON ASSESS: CPT | Performed by: INTERNAL MEDICINE

## 2021-07-21 PROCEDURE — G8400 PT W/DXA NO RESULTS DOC: HCPCS | Performed by: INTERNAL MEDICINE

## 2021-07-21 PROCEDURE — G8427 DOCREV CUR MEDS BY ELIG CLIN: HCPCS | Performed by: INTERNAL MEDICINE

## 2021-07-21 PROCEDURE — 1036F TOBACCO NON-USER: CPT | Performed by: INTERNAL MEDICINE

## 2021-07-21 PROCEDURE — 99214 OFFICE O/P EST MOD 30 MIN: CPT | Performed by: INTERNAL MEDICINE

## 2021-07-21 PROCEDURE — G8417 CALC BMI ABV UP PARAM F/U: HCPCS | Performed by: INTERNAL MEDICINE

## 2021-07-21 PROCEDURE — 1123F ACP DISCUSS/DSCN MKR DOCD: CPT | Performed by: INTERNAL MEDICINE

## 2021-07-21 NOTE — TELEPHONE ENCOUNTER
AVS from 7/21/21     rv in 4 months with labs prior     *rv is sched for Send[facility]  *pt will have labs done 1 week prior to rv  (cbc,cmp,ld)    PT was given AVS and an appt schedule

## 2021-07-21 NOTE — PROGRESS NOTES
hemoptysis. Cardiovascular: Denies chest pain, PND or orthopnea. No L E swelling or palpitations. Gastrointestinal: No nausea or vomiting, abdominal pain, diarrhea or constipation. Genitourinary: Denies dysuria, hematuria, frequency, urgency or incontinence. Neurological: Denies headaches, decreased LOC, no sensory or motor focal deficits. Musculoskeletal: No arthralgia no back pain or joint swelling. Skin: There are no rashes or bleeding. Psych: Denies hallucinations or intentions to harm self      PHYSICAL EXAM:         Physical Exam  Vitals and nursing note reviewed. Constitutional:       General: She is not in acute distress. Appearance: She is well-developed. HENT:      Head: Normocephalic and atraumatic. Eyes:      Pupils: Pupils are equal, round, and reactive to light. Cardiovascular:      Rate and Rhythm: Normal rate and regular rhythm. Heart sounds: Normal heart sounds. No murmur heard. Pulmonary:      Effort: Pulmonary effort is normal. No respiratory distress. Breath sounds: Normal breath sounds. No stridor. No wheezing. Abdominal:      General: Bowel sounds are normal. There is no distension. Palpations: Abdomen is soft. Tenderness: There is no abdominal tenderness. Musculoskeletal:         General: Normal range of motion. Cervical back: Neck supple. Skin:     General: Skin is warm and dry. Findings: No rash. Neurological:      Mental Status: She is alert and oriented to person, place, and time. Cranial Nerves: No cranial nerve deficit.    Psychiatric:         Behavior: Behavior normal.         LABS:   Results for orders placed or performed during the hospital encounter of 07/08/21   CBC Auto Differential   Result Value Ref Range    WBC 38.7 (HH) 3.5 - 11.0 k/uL    RBC 4.17 4.0 - 5.2 m/uL    Hemoglobin 13.1 12.0 - 16.0 g/dL    Hematocrit 41.4 36 - 46 %    MCV 99.4 80 - 100 fL    MCH 31.5 26 - 34 pg    MCHC 31.7 31 - 37 g/dL    RDW 14.5 12.5 - 15.4 %    Platelets 533 (L) 665 - 450 k/uL    MPV 7.3 6.0 - 12.0 fL    NRBC Automated NOT REPORTED per 100 WBC    Differential Type NOT REPORTED     Immature Granulocytes NOT REPORTED 0 %    Absolute Immature Granulocyte NOT REPORTED 0.00 - 0.30 k/uL    WBC Morphology NOT REPORTED     RBC Morphology NOT REPORTED     Platelet Estimate NOT REPORTED     Seg Neutrophils 8 (L) 36 - 66 %    Lymphocytes 83 (H) 24 - 44 %    Atypical Lymphocytes 7 %    Monocytes 2 1 - 7 %    Eosinophils % 0 (L) 1 - 4 %    Basophils 0 0 - 2 %    Segs Absolute 3.10 1.8 - 7.7 k/uL    Absolute Lymph # 32.12 (H) 1.0 - 4.8 k/uL    Atypical Lymphocytes Absolute 2.71 k/uL    Absolute Mono # 0.77 0.1 - 0.8 k/uL    Absolute Eos # 0.00 0.0 - 0.4 k/uL    Basophils Absolute 0.00 0.0 - 0.2 k/uL    Morphology SMUDGE CELLS PRESENT    Comprehensive Metabolic Panel   Result Value Ref Range    Glucose 88 70 - 99 mg/dL    BUN 12 8 - 23 mg/dL    CREATININE 0.82 0.50 - 0.90 mg/dL    Bun/Cre Ratio NOT REPORTED 9 - 20    Calcium 9.7 8.6 - 10.4 mg/dL    Sodium 141 135 - 144 mmol/L    Potassium 4.6 3.7 - 5.3 mmol/L    Chloride 103 98 - 107 mmol/L    CO2 28 20 - 31 mmol/L    Anion Gap 10 9 - 17 mmol/L    Alkaline Phosphatase 75 35 - 104 U/L    ALT 10 5 - 33 U/L    AST 27 <32 U/L    Total Bilirubin 0.66 0.3 - 1.2 mg/dL    Total Protein 6.3 (L) 6.4 - 8.3 g/dL    Albumin 4.6 3.5 - 5.2 g/dL    Albumin/Globulin Ratio 2.7 (H) 1.0 - 2.5    GFR Non-African American >60 >60 mL/min    GFR African American >60 >60 mL/min    GFR Comment          GFR Staging NOT REPORTED    Lactate Dehydrogenase   Result Value Ref Range     (H) 135 - 214 U/L     IMPRESSION:     1.  CLL (chronic lymphocytic leukemia) ([facility]        Patient Active Problem List   Diagnosis    Mixed hyperlipidemia    Hypothyroidism    Essential hypertension    CLL [facility]chronic lymphocytic leukemia) ([facility]    History of MRSA infection    Thrombocytopenia ([facility] [address]    Hypogammaglobulinemia ([facility] [address]       PLAN:

## 2021-08-31 ENCOUNTER — TELEPHONE (OUTPATIENT)
Dept: PRIMARY CARE CLINIC | Age: 76
End: 2021-08-31

## 2021-08-31 NOTE — TELEPHONE ENCOUNTER
Patient is requesting the coding be changed on the bill scanned in under [employer] for the date of 07/13/2021. Patient was billed for an \"initial encounter\" however this was not her first visit to the office. Patient states billing department directed her here to have it corrected at the physician level and be resubmitted. Please advise.

## 2021-08-31 NOTE — TELEPHONE ENCOUNTER
I am not sure it went in as an initial medicare wellness visit. I changed the code on it to a subsequent medicare wellness visit. I hope this fixes things.

## 2021-10-04 DIAGNOSIS — E78.2 MIXED HYPERLIPIDEMIA: ICD-10-CM

## 2021-10-04 RX ORDER — ROSUVASTATIN CALCIUM 5 MG/1
5 TABLET, COATED ORAL DAILY
Qty: 90 TABLET | Refills: 2 | Status: SHIPPED | OUTPATIENT
Start: 2021-10-04

## 2021-11-04 ENCOUNTER — HOSPITAL ENCOUNTER (OUTPATIENT)
Age: 76
Discharge: HOME | End: 2021-11-04
Payer: MEDICARE

## 2021-11-04 DIAGNOSIS — D69.6 THROMBOCYTOPENIA (HCC): ICD-10-CM

## 2021-11-04 DIAGNOSIS — R74.02 ELEVATED LDH: ICD-10-CM

## 2021-11-04 DIAGNOSIS — Z13.220 ENCOUNTER FOR LIPID SCREENING FOR CARDIOVASCULAR DISEASE: ICD-10-CM

## 2021-11-04 DIAGNOSIS — C91.10 CLL (CHRONIC LYMPHOCYTIC LEUKEMIA) (HCC): ICD-10-CM

## 2021-11-04 DIAGNOSIS — E03.9 HYPOTHYROIDISM, UNSPECIFIED TYPE: ICD-10-CM

## 2021-11-04 DIAGNOSIS — D80.1 HYPOGAMMAGLOBULINEMIA (HCC): ICD-10-CM

## 2021-11-04 DIAGNOSIS — Z13.6 ENCOUNTER FOR LIPID SCREENING FOR CARDIOVASCULAR DISEASE: ICD-10-CM

## 2021-11-04 LAB
ABSOLUTE EOS #: 0 K/UL (ref 0–0.4)
ABSOLUTE IMMATURE GRANULOCYTE: ABNORMAL K/UL (ref 0–0.3)
ABSOLUTE LYMPH #: 30.78 K/UL (ref 1–4.8)
ABSOLUTE MONO #: 0 K/UL (ref 0.1–0.8)
ALBUMIN SERPL-MCNC: 4.4 G/DL (ref 3.5–5.2)
ALBUMIN/GLOBULIN RATIO: 2.8 (ref 1–2.5)
ALP BLD-CCNC: 69 U/L (ref 35–104)
ALT SERPL-CCNC: 10 U/L (ref 5–33)
ANION GAP SERPL CALCULATED.3IONS-SCNC: 7 MMOL/L (ref 9–17)
AST SERPL-CCNC: 26 U/L
BASOPHILS # BLD: 1 % (ref 0–2)
BASOPHILS ABSOLUTE: 0.33 K/UL (ref 0–0.2)
BILIRUB SERPL-MCNC: 0.59 MG/DL (ref 0.3–1.2)
BUN BLDV-MCNC: 15 MG/DL (ref 8–23)
BUN/CREAT BLD: ABNORMAL (ref 9–20)
CALCIUM SERPL-MCNC: 9.5 MG/DL (ref 8.6–10.4)
CHLORIDE BLD-SCNC: 104 MMOL/L (ref 98–107)
CHOLESTEROL, FASTING: 231 MG/DL
CHOLESTEROL/HDL RATIO: 3.6
CO2: 28 MMOL/L (ref 20–31)
CREAT SERPL-MCNC: 0.74 MG/DL (ref 0.5–0.9)
DIFFERENTIAL TYPE: ABNORMAL
EOSINOPHILS RELATIVE PERCENT: 0 % (ref 1–4)
GFR AFRICAN AMERICAN: >60 ML/MIN
GFR NON-AFRICAN AMERICAN: >60 ML/MIN
GFR SERPL CREATININE-BSD FRML MDRD: ABNORMAL ML/MIN/{1.73_M2}
GFR SERPL CREATININE-BSD FRML MDRD: ABNORMAL ML/MIN/{1.73_M2}
GLUCOSE BLD-MCNC: 97 MG/DL (ref 70–99)
HCT VFR BLD CALC: 42.1 % (ref 36–46)
HDLC SERPL-MCNC: 65 MG/DL
HEMOGLOBIN: 13.6 G/DL (ref 12–16)
IMMATURE GRANULOCYTES: ABNORMAL %
LACTATE DEHYDROGENASE: 234 U/L (ref 135–214)
LDL CHOLESTEROL: 146 MG/DL (ref 0–130)
LYMPHOCYTES # BLD: 93 % (ref 24–44)
MCH RBC QN AUTO: 32.4 PG (ref 26–34)
MCHC RBC AUTO-ENTMCNC: 32.3 G/DL (ref 31–37)
MCV RBC AUTO: 100.3 FL (ref 80–100)
MONOCYTES # BLD: 0 % (ref 1–7)
MORPHOLOGY: ABNORMAL
NRBC AUTOMATED: ABNORMAL PER 100 WBC
PDW BLD-RTO: 14.4 % (ref 12.5–15.4)
PLATELET # BLD: 131 K/UL (ref 140–450)
PLATELET ESTIMATE: ABNORMAL
PMV BLD AUTO: 7.9 FL (ref 6–12)
POTASSIUM SERPL-SCNC: 5.6 MMOL/L (ref 3.7–5.3)
RBC # BLD: 4.19 M/UL (ref 4–5.2)
RBC # BLD: ABNORMAL 10*6/UL
SEG NEUTROPHILS: 6 % (ref 36–66)
SEGMENTED NEUTROPHILS ABSOLUTE COUNT: 1.99 K/UL (ref 1.8–7.7)
SODIUM BLD-SCNC: 139 MMOL/L (ref 135–144)
THYROXINE, FREE: 1.66 NG/DL (ref 0.93–1.7)
TOTAL PROTEIN: 6 G/DL (ref 6.4–8.3)
TRIGLYCERIDE, FASTING: 98 MG/DL
TSH SERPL DL<=0.05 MIU/L-ACNC: 7.58 MIU/L (ref 0.3–5)
VLDLC SERPL CALC-MCNC: ABNORMAL MG/DL (ref 1–30)
WBC # BLD: 33.1 K/UL (ref 3.5–11)
WBC # BLD: ABNORMAL 10*3/UL

## 2021-11-04 PROCEDURE — 36415 COLL VENOUS BLD VENIPUNCTURE: CPT

## 2021-11-04 PROCEDURE — 80053 COMPREHEN METABOLIC PANEL: CPT

## 2021-11-04 PROCEDURE — 80061 LIPID PANEL: CPT

## 2021-11-04 PROCEDURE — 83615 LACTATE (LD) (LDH) ENZYME: CPT

## 2021-11-04 PROCEDURE — 84443 ASSAY THYROID STIM HORMONE: CPT

## 2021-11-04 PROCEDURE — 85025 COMPLETE CBC W/AUTO DIFF WBC: CPT

## 2021-11-04 PROCEDURE — 84439 ASSAY OF FREE THYROXINE: CPT

## 2021-11-08 ENCOUNTER — OFFICE VISIT (OUTPATIENT)
Dept: PRIMARY CARE CLINIC | Age: 76
End: 2021-11-08
Payer: MEDICARE

## 2021-11-08 VITALS
HEART RATE: 76 BPM | WEIGHT: 144.5 LBS | OXYGEN SATURATION: 92 % | BODY MASS INDEX: 27.46 KG/M2 | DIASTOLIC BLOOD PRESSURE: 64 MMHG | SYSTOLIC BLOOD PRESSURE: 130 MMHG

## 2021-11-08 DIAGNOSIS — E03.9 HYPOTHYROIDISM, UNSPECIFIED TYPE: ICD-10-CM

## 2021-11-08 DIAGNOSIS — I10 ESSENTIAL HYPERTENSION: Primary | ICD-10-CM

## 2021-11-08 DIAGNOSIS — E87.5 HYPERKALEMIA: ICD-10-CM

## 2021-11-08 PROCEDURE — G8427 DOCREV CUR MEDS BY ELIG CLIN: HCPCS | Performed by: NURSE PRACTITIONER

## 2021-11-08 PROCEDURE — 1090F PRES/ABSN URINE INCON ASSESS: CPT | Performed by: NURSE PRACTITIONER

## 2021-11-08 PROCEDURE — 1123F ACP DISCUSS/DSCN MKR DOCD: CPT | Performed by: NURSE PRACTITIONER

## 2021-11-08 PROCEDURE — 4040F PNEUMOC VAC/ADMIN/RCVD: CPT | Performed by: NURSE PRACTITIONER

## 2021-11-08 PROCEDURE — G8417 CALC BMI ABV UP PARAM F/U: HCPCS | Performed by: NURSE PRACTITIONER

## 2021-11-08 PROCEDURE — G8400 PT W/DXA NO RESULTS DOC: HCPCS | Performed by: NURSE PRACTITIONER

## 2021-11-08 PROCEDURE — 99214 OFFICE O/P EST MOD 30 MIN: CPT | Performed by: NURSE PRACTITIONER

## 2021-11-08 PROCEDURE — G8484 FLU IMMUNIZE NO ADMIN: HCPCS | Performed by: NURSE PRACTITIONER

## 2021-11-08 PROCEDURE — 1036F TOBACCO NON-USER: CPT | Performed by: NURSE PRACTITIONER

## 2021-11-08 ASSESSMENT — ENCOUNTER SYMPTOMS
EYE REDNESS: 0
WHEEZING: 0
ABDOMINAL PAIN: 0
VOMITING: 0
EYE DISCHARGE: 0
NAUSEA: 0
DIARRHEA: 0
SHORTNESS OF BREATH: 0
SORE THROAT: 0
COUGH: 0
RHINORRHEA: 0

## 2021-11-08 NOTE — PROGRESS NOTES
[facility] [facility]  [address]  [facility] [address] [place] [address]  Dept: [phone]    Paul Grewal is a 64 y.o. female Established patient, who presents today for her medical conditions/complaints as noted below. Chief Complaint   Patient presents with    Thyroid Problem    Discuss Labs       HPI:     HPI  She states she is feeling well. He  has been through radiation therapy. Last needle biopsy did show cancer cells. He is likely to have another surgery. She generally plays golf or dose [employer] or something similar to combat stress. Blood pressure is good today. Blood pressure at home runs 120's to 140's/ 60-70's. She states she does notice that her blood pressure decreases if she sits and rests for a few minutes. She typically takes her levothyroxine with her morning coffee. She has been on ramipril for many years    States she does monitor at home. Reviewed prior notes oncology  Reviewed previous Labs    LDL Cholesterol (mg/dL)   Date Value   11/04/2021 146 (H)   10/13/2020 103   10/02/2019 105       (goal LDL is <100)   AST (U/L)   Date Value   11/04/2021 26     ALT (U/L)   Date Value   11/04/2021 10     BUN (mg/dL)   Date Value   11/04/2021 15     TSH (mIU/L)   Date Value   11/04/2021 7.58 (H)     BP Readings from Last 3 Encounters:   11/08/21 130/64   07/21/21 (!) 146/82   07/13/21 130/86          (goal 120/80)    Past Medical History:   Diagnosis Date    CLL (chronic lymphocytic leukemia) ([facility] [address] 2004    Hyperlipidemia     Hypothyroidism     MRSA (methicillin resistant Staphylococcus aureus) infection       Past Surgical History:   Procedure Laterality Date    BREAST BIOPSY Right        Family History   Problem Relation Age of Onset    Dementia Mother     Other Father         CLL       Social History     Tobacco Use    Smoking status: Never Smoker    Smokeless tobacco: Never Used   Substance Use Topics    Alcohol use:  Yes Current Outpatient Medications   Medication Sig Dispense Refill    rosuvastatin (CRESTOR) 5 MG tablet Take 1 tablet by mouth daily 90 tablet 2    ramipril (ALTACE) 5 MG capsule Take 1 capsule by mouth daily 90 capsule 3    levothyroxine (SYNTHROID) 88 MCG tablet Take 1 tablet by mouth Daily 90 tablet 3     No current facility-administered medications for this visit. Allergies   Allergen Reactions    Penicillins Hives, Itching and Swelling    Epinephrine Other (See Comments)     Can tolerate lower doses, but if higher dose: will cause high heart rate. Health Maintenance   Topic Date Due    Hepatitis C screen  Never done    DTaP/Tdap/Td vaccine (1 - Tdap) Never done    Shingles Vaccine (1 of 2) Never done    Pneumococcal 65+ yrs at Risk Vaccine (2 of 2 - PCV13) 10/27/2010    Flu vaccine (1) 09/01/2021    Annual Wellness Visit (AWV)  07/14/2022    Lipid screen  11/04/2022    TSH testing  11/04/2022    Potassium monitoring  11/04/2022    Creatinine monitoring  11/04/2022    DEXA (modify frequency per FRAX score)  Completed    COVID-19 Vaccine  Completed    Hepatitis A vaccine  Aged Out    Hepatitis B vaccine  Aged Out    Hib vaccine  Aged Out    Meningococcal (ACWY) vaccine  Aged Out       Subjective:      Review of Systems   Constitutional: Negative for chills and fever. HENT: Negative for rhinorrhea and sore throat. Eyes: Negative for discharge and redness. Respiratory: Negative for cough, shortness of breath and wheezing. Cardiovascular: Negative for chest pain and palpitations. Gastrointestinal: Negative for abdominal pain, diarrhea, nausea and vomiting. Genitourinary: Negative for dysuria and frequency. Musculoskeletal: Negative for arthralgias and myalgias. Neurological: Negative for dizziness, light-headedness and headaches. Psychiatric/Behavioral: Negative for dysphoric mood and sleep disturbance. The patient is not nervous/anxious.          Possibly a little more on edge this year, she attributes it to  having cancer and of the treatments to go along with that. Objective:     /64   Pulse 76   Wt 144 lb 8 oz (65.5 kg)   SpO2 92%   BMI 27.46 kg/m²   Physical Exam  Vitals and nursing note reviewed. Constitutional:       General: She is not in acute distress. Appearance: She is well-developed. She is not ill-appearing. HENT:      Head: Normocephalic and atraumatic. Right Ear: Tympanic membrane, ear canal and external ear normal.      Left Ear: Tympanic membrane, ear canal and external ear normal.   Eyes:      General: No scleral icterus. Right eye: No discharge. Left eye: No discharge. Conjunctiva/sclera: Conjunctivae normal.      Pupils: Pupils are equal, round, and reactive to light. Neck:      Thyroid: No thyromegaly. Trachea: No tracheal deviation. Cardiovascular:      Rate and Rhythm: Normal rate and regular rhythm. Heart sounds: Normal heart sounds. Comments: No carotid bruit  Pulmonary:      Effort: Pulmonary effort is normal. No respiratory distress. Breath sounds: Normal breath sounds. No wheezing. Abdominal:      General: Bowel sounds are normal.      Palpations: Abdomen is soft. Musculoskeletal:      Right lower leg: No edema. Left lower leg: No edema. Lymphadenopathy:      Cervical: No cervical adenopathy. Skin:     General: Skin is warm and dry. Findings: No rash. Neurological:      Mental Status: She is alert and oriented to person, place, and time. Psychiatric:         Mood and Affect: Mood normal.         Behavior: Behavior normal.         Thought Content: Thought content normal.         Assessment/Plan:   1. Essential hypertension  -     Basic Metabolic Panel; Future  2. Hypothyroidism, unspecified type  -     TSH; Future  -     T4, Free; Future  3. Hyperkalemia  -     Basic Metabolic Panel; Future     Continue current medications.   Recheck BMP for potassium in one month. Decrease high potassium foods. Try to not have food or drink within on hour of taking levothyroxine. Recheck TSH and Free T4 in 3-4 months. If potassium does not improve, will consider discontinuing ramipril. She had influenza vaccine on 11/4/2021  Return in about 4 months (around 3/8/2022) for Hypothryoidism . Orders Placed This Encounter   Procedures    TSH     Standing Status:   Future     Standing Expiration Date:   11/8/2022    T4, Free     Standing Status:   Future     Standing Expiration Date:   11/8/2022    Basic Metabolic Panel     Standing Status:   Future     Standing Expiration Date:   11/8/2022     No orders of the defined types were placed in this encounter. Patient given educational materials - see patient instructions. Discussed use, benefit, and side effects of prescribed medications. All patient questions answered. Pt voiced understanding. Reviewed health maintenance. Instructed to continue current medications, diet and exercise. Patient agreed with treatment plan. Follow up as directed.      Electronically signed by KIRA Santillan CNP on 11/8/2021 at 10:23 AM

## 2021-11-08 NOTE — PATIENT INSTRUCTIONS
Continue current medications. Recheck BMP for potassium in one month. Decrease high potassium foods. Try to not have food or drink within on hour of taking levothyroxine. Recheck TSH and Free T4 in 3-4 months.

## 2021-11-10 ENCOUNTER — OFFICE VISIT (OUTPATIENT)
Dept: ONCOLOGY | Age: 76
End: 2021-11-10
Payer: MEDICARE

## 2021-11-10 ENCOUNTER — TELEPHONE (OUTPATIENT)
Dept: ONCOLOGY | Age: 76
End: 2021-11-10

## 2021-11-10 VITALS
HEART RATE: 66 BPM | BODY MASS INDEX: 27.67 KG/M2 | WEIGHT: 145.6 LBS | SYSTOLIC BLOOD PRESSURE: 155 MMHG | DIASTOLIC BLOOD PRESSURE: 83 MMHG | TEMPERATURE: 96.6 F

## 2021-11-10 DIAGNOSIS — E87.5 HYPERKALEMIA: ICD-10-CM

## 2021-11-10 DIAGNOSIS — D69.6 THROMBOCYTOPENIA (HCC): ICD-10-CM

## 2021-11-10 DIAGNOSIS — R74.02 ELEVATED LDH: ICD-10-CM

## 2021-11-10 DIAGNOSIS — D80.1 HYPOGAMMAGLOBULINEMIA (HCC): ICD-10-CM

## 2021-11-10 DIAGNOSIS — C91.10 CLL (CHRONIC LYMPHOCYTIC LEUKEMIA) (HCC): Primary | ICD-10-CM

## 2021-11-10 PROCEDURE — 99211 OFF/OP EST MAY X REQ PHY/QHP: CPT | Performed by: INTERNAL MEDICINE

## 2021-11-10 PROCEDURE — 99214 OFFICE O/P EST MOD 30 MIN: CPT | Performed by: INTERNAL MEDICINE

## 2021-11-10 PROCEDURE — G8417 CALC BMI ABV UP PARAM F/U: HCPCS | Performed by: INTERNAL MEDICINE

## 2021-11-10 PROCEDURE — 1090F PRES/ABSN URINE INCON ASSESS: CPT | Performed by: INTERNAL MEDICINE

## 2021-11-10 PROCEDURE — G8400 PT W/DXA NO RESULTS DOC: HCPCS | Performed by: INTERNAL MEDICINE

## 2021-11-10 PROCEDURE — G8427 DOCREV CUR MEDS BY ELIG CLIN: HCPCS | Performed by: INTERNAL MEDICINE

## 2021-11-10 PROCEDURE — 1123F ACP DISCUSS/DSCN MKR DOCD: CPT | Performed by: INTERNAL MEDICINE

## 2021-11-10 PROCEDURE — 4040F PNEUMOC VAC/ADMIN/RCVD: CPT | Performed by: INTERNAL MEDICINE

## 2021-11-10 PROCEDURE — 1036F TOBACCO NON-USER: CPT | Performed by: INTERNAL MEDICINE

## 2021-11-10 PROCEDURE — G8484 FLU IMMUNIZE NO ADMIN: HCPCS | Performed by: INTERNAL MEDICINE

## 2021-11-10 NOTE — TELEPHONE ENCOUNTER
AVS from 11/10/21    rv in 4 months with labs prior    RV scheduled 3/2/21 @ 1:#0pm    PT will have labs drawn one week prior to return visit     PT was given AVS and an appt schedule    Electronically signed by Jake Calix on 11/10/2021 at 12:18 PM

## 2021-11-10 NOTE — PROGRESS NOTES
throat   Respiratory: No chest pain, no shortness of breath, no cough or hemoptysis. Cardiovascular: Denies chest pain, PND or orthopnea. No L E swelling or palpitations. Gastrointestinal: No nausea or vomiting, abdominal pain, diarrhea or constipation. Genitourinary: Denies dysuria, hematuria, frequency, urgency or incontinence. Neurological: Denies headaches, decreased LOC, no sensory or motor focal deficits. Musculoskeletal: No arthralgia no back pain or joint swelling. Skin: There are no rashes or bleeding. Psych: Denies hallucinations or intentions to harm self      PHYSICAL EXAM:         Physical Exam  Vitals and nursing note reviewed. Constitutional:       General: She is not in acute distress. Appearance: She is well-developed. HENT:      Head: Normocephalic and atraumatic. Eyes:      Pupils: Pupils are equal, round, and reactive to light. Cardiovascular:      Rate and Rhythm: Normal rate and regular rhythm. Heart sounds: Normal heart sounds. No murmur heard. Pulmonary:      Effort: Pulmonary effort is normal. No respiratory distress. Breath sounds: Normal breath sounds. No stridor. No wheezing. Abdominal:      General: Bowel sounds are normal. There is no distension. Palpations: Abdomen is soft. Tenderness: There is no abdominal tenderness. Musculoskeletal:         General: Normal range of motion. Cervical back: Neck supple. Skin:     General: Skin is warm and dry. Findings: No rash. Neurological:      Mental Status: She is alert and oriented to person, place, and time. Cranial Nerves: No cranial nerve deficit.    Psychiatric:         Behavior: Behavior normal.         LABS:   Results for orders placed or performed during the hospital encounter of 11/04/21   Lactate Dehydrogenase   Result Value Ref Range     (H) 135 - 214 U/L   Comprehensive Metabolic Panel   Result Value Ref Range    Glucose 97 70 - 99 mg/dL    BUN 15 8 - 23 mg/dL CREATININE 0.74 0.50 - 0.90 mg/dL    Bun/Cre Ratio NOT REPORTED 9 - 20    Calcium 9.5 8.6 - 10.4 mg/dL    Sodium 139 135 - 144 mmol/L    Potassium 5.6 (H) 3.7 - 5.3 mmol/L    Chloride 104 98 - 107 mmol/L    CO2 28 20 - 31 mmol/L    Anion Gap 7 (L) 9 - 17 mmol/L    Alkaline Phosphatase 69 35 - 104 U/L    ALT 10 5 - 33 U/L    AST 26 <32 U/L    Total Bilirubin 0.59 0.3 - 1.2 mg/dL    Total Protein 6.0 (L) 6.4 - 8.3 g/dL    Albumin 4.4 3.5 - 5.2 g/dL    Albumin/Globulin Ratio 2.8 (H) 1.0 - 2.5    GFR Non-African American >60 >60 mL/min    GFR African American >60 >60 mL/min    GFR Comment          GFR Staging NOT REPORTED    CBC Auto Differential   Result Value Ref Range    WBC 33.1 (HH) 3.5 - 11.0 k/uL    RBC 4.19 4.0 - 5.2 m/uL    Hemoglobin 13.6 12.0 - 16.0 g/dL    Hematocrit 42.1 36 - 46 %    .3 (H) 80 - 100 fL    MCH 32.4 26 - 34 pg    MCHC 32.3 31 - 37 g/dL    RDW 14.4 12.5 - 15.4 %    Platelets 021 (L) 367 - 450 k/uL    MPV 7.9 6.0 - 12.0 fL    NRBC Automated NOT REPORTED per 100 WBC    Differential Type NOT REPORTED     Immature Granulocytes NOT REPORTED 0 %    Absolute Immature Granulocyte NOT REPORTED 0.00 - 0.30 k/uL    WBC Morphology NOT REPORTED     RBC Morphology NOT REPORTED     Platelet Estimate NOT REPORTED     Seg Neutrophils 6 (L) 36 - 66 %    Lymphocytes 93 (H) 24 - 44 %    Monocytes 0 (L) 1 - 7 %    Eosinophils % 0 (L) 1 - 4 %    Basophils 1 0 - 2 %    Segs Absolute 1.99 1.8 - 7.7 k/uL    Absolute Lymph # 30.78 (H) 1.0 - 4.8 k/uL    Absolute Mono # 0.00 (L) 0.1 - 0.8 k/uL    Absolute Eos # 0.00 0.0 - 0.4 k/uL    Basophils Absolute 0.33 (H) 0.0 - 0.2 k/uL    Morphology SMUDGE CELLS PRESENT      IMPRESSION:     1.  CLL (chronic lymphocytic leukemia) ([facility]        Patient Active Problem List   Diagnosis    Mixed hyperlipidemia    Hypothyroidism    Essential hypertension    CLL (chronic lymphocytic leukemia) ([facility]    History of MRSA infection    [facility]   

## 2021-12-30 ENCOUNTER — TELEPHONE (OUTPATIENT)
Dept: PRIMARY CARE CLINIC | Age: 76
End: 2021-12-30

## 2021-12-30 DIAGNOSIS — B37.31 VAGINAL YEAST INFECTION: Primary | ICD-10-CM

## 2021-12-30 RX ORDER — FLUCONAZOLE 100 MG/1
100 TABLET ORAL DAILY
Qty: 7 TABLET | Refills: 0 | Status: SHIPPED | OUTPATIENT
Start: 2021-12-30 | End: 2022-01-06

## 2021-12-30 NOTE — TELEPHONE ENCOUNTER
Patient would like prescription sent to [facility] in [address] [place] [address].

## 2021-12-30 NOTE — TELEPHONE ENCOUNTER
----- Message from Soila Chu sent at 12/30/2021  8:49 AM EST -----  Subject: Appointment Request    Reason for Call: Urgent (Patient Request) No Script    QUESTIONS  Type of Appointment? Established Patient  Reason for appointment request? No appointments available during search  Additional Information for Provider? Pt called in to schedule an clara for   vaginal discharge and she believes it is an yeast infection. No clara are   available. Pt would like an prescription.  ---------------------------------------------------------------------------  --------------  CALL BACK INFO  What is the best way for the office to contact you? OK to leave message on   voicemail  Preferred Call Back Phone Number? [phone]  ---------------------------------------------------------------------------  --------------  SCRIPT ANSWERS  Relationship to Patient? Self  (Is the patient requesting to see the provider for a procedure?)? No  (Is the patient requesting to see the provider urgently  today or   tomorrow. )? Yes  Have you been diagnosed with, awaiting test results for, or told that you   are suspected of having COVID-19 (Coronavirus)? (If patient has tested   negative or was tested as a requirement for work, school, or travel and   not based on symptoms, answer no)? No  Within the past two weeks have you developed any of the following symptoms   (answer no if symptoms have been present longer than 2 weeks or began   more than 2 weeks ago)? Fever or Chills, Cough, Shortness of breath or   difficulty breathing, Loss of taste or smell, Sore throat, Nasal   congestion, Sneezing or runny nose, Fatigue or generalized body aches   (answer no if pain is specific to a body part e.g. back pain), Diarrhea,   Headache? No  Have you had close contact with someone with COVID-19 in the last 14 days? No  (Service Expert  click yes below to proceed with Friends Around As Usual   Scheduling)?  Yes

## 2022-01-04 ENCOUNTER — HOSPITAL ENCOUNTER (OUTPATIENT)
Dept: MAMMOGRAPHY | Age: 77
Discharge: HOME | End: 2022-01-06
Payer: MEDICARE

## 2022-01-04 ENCOUNTER — HOSPITAL ENCOUNTER (OUTPATIENT)
Age: 77
Discharge: HOME | End: 2022-01-04
Payer: MEDICARE

## 2022-01-04 DIAGNOSIS — E87.5 HYPERKALEMIA: ICD-10-CM

## 2022-01-04 DIAGNOSIS — I10 ESSENTIAL HYPERTENSION: ICD-10-CM

## 2022-01-04 DIAGNOSIS — Z12.31 VISIT FOR SCREENING MAMMOGRAM: ICD-10-CM

## 2022-01-04 LAB
ANION GAP SERPL CALCULATED.3IONS-SCNC: 8 MMOL/L (ref 9–17)
BUN BLDV-MCNC: 17 MG/DL (ref 8–23)
BUN/CREAT BLD: ABNORMAL (ref 9–20)
CALCIUM SERPL-MCNC: 9.7 MG/DL (ref 8.6–10.4)
CHLORIDE BLD-SCNC: 101 MMOL/L (ref 98–107)
CO2: 28 MMOL/L (ref 20–31)
CREAT SERPL-MCNC: 0.73 MG/DL (ref 0.5–0.9)
GFR AFRICAN AMERICAN: >60 ML/MIN
GFR NON-AFRICAN AMERICAN: >60 ML/MIN
GFR SERPL CREATININE-BSD FRML MDRD: ABNORMAL ML/MIN/{1.73_M2}
GFR SERPL CREATININE-BSD FRML MDRD: ABNORMAL ML/MIN/{1.73_M2}
GLUCOSE BLD-MCNC: 81 MG/DL (ref 70–99)
POTASSIUM SERPL-SCNC: 5.2 MMOL/L (ref 3.7–5.3)
SODIUM BLD-SCNC: 137 MMOL/L (ref 135–144)

## 2022-01-04 PROCEDURE — 80048 BASIC METABOLIC PNL TOTAL CA: CPT

## 2022-01-04 PROCEDURE — 36415 COLL VENOUS BLD VENIPUNCTURE: CPT

## 2022-01-04 PROCEDURE — 77063 BREAST TOMOSYNTHESIS BI: CPT

## 2022-01-05 ENCOUNTER — OFFICE VISIT (OUTPATIENT)
Dept: PRIMARY CARE CLINIC | Age: 77
End: 2022-01-05
Payer: MEDICARE

## 2022-01-05 VITALS
SYSTOLIC BLOOD PRESSURE: 136 MMHG | BODY MASS INDEX: 27.29 KG/M2 | HEART RATE: 61 BPM | WEIGHT: 143.6 LBS | DIASTOLIC BLOOD PRESSURE: 88 MMHG | OXYGEN SATURATION: 100 %

## 2022-01-05 DIAGNOSIS — B37.31 VAGINAL YEAST INFECTION: Primary | ICD-10-CM

## 2022-01-05 PROCEDURE — 1090F PRES/ABSN URINE INCON ASSESS: CPT | Performed by: NURSE PRACTITIONER

## 2022-01-05 PROCEDURE — 1123F ACP DISCUSS/DSCN MKR DOCD: CPT | Performed by: NURSE PRACTITIONER

## 2022-01-05 PROCEDURE — G8484 FLU IMMUNIZE NO ADMIN: HCPCS | Performed by: NURSE PRACTITIONER

## 2022-01-05 PROCEDURE — 99213 OFFICE O/P EST LOW 20 MIN: CPT | Performed by: NURSE PRACTITIONER

## 2022-01-05 PROCEDURE — G8417 CALC BMI ABV UP PARAM F/U: HCPCS | Performed by: NURSE PRACTITIONER

## 2022-01-05 PROCEDURE — 1036F TOBACCO NON-USER: CPT | Performed by: NURSE PRACTITIONER

## 2022-01-05 PROCEDURE — G8427 DOCREV CUR MEDS BY ELIG CLIN: HCPCS | Performed by: NURSE PRACTITIONER

## 2022-01-05 PROCEDURE — 4040F PNEUMOC VAC/ADMIN/RCVD: CPT | Performed by: NURSE PRACTITIONER

## 2022-01-05 PROCEDURE — G8400 PT W/DXA NO RESULTS DOC: HCPCS | Performed by: NURSE PRACTITIONER

## 2022-01-05 RX ORDER — NYSTATIN 100000 U/G
CREAM TOPICAL
Qty: 30 G | Refills: 2 | Status: SHIPPED | OUTPATIENT
Start: 2022-01-05 | End: 2022-02-09

## 2022-01-05 NOTE — PROGRESS NOTES
[address] [address] PRIMARY CARE  [facility] [address]  [address] [address] [place] [address]  Dept: [phone]    Peggy Martinez is a 64 y.o. female Established patient, who presents today for her medical conditions/complaints as noted below. Chief Complaint   Patient presents with    Vaginal Discharge     pt states today is the last day of her diflucan - she is trying to confirm that this is a yeast infection and not something else. pt reports vagina is inflammed & red with burning. HPI:     HPI  She has been spacing out medication differently. She is taking levothyroxine earlier and then ramipril later at breakfast.    Vaginal discharge for about 3 days last week. It was not odorous. It was not white. It was more clear than anything. She is on last day of diflucan today. There is less discharge now. She has more vaginal irritation, burning. She did use Replens last night. She still has redness in vaginal.  She has less pain and burning today. However it is not gone today.     Reviewed prior notes    Reviewed previous Labs and Imaging    LDL Cholesterol (mg/dL)   Date Value   11/04/2021 146 (H)   10/13/2020 103   10/02/2019 105       (goal LDL is <100)   AST (U/L)   Date Value   11/04/2021 26     ALT (U/L)   Date Value   11/04/2021 10     BUN (mg/dL)   Date Value   01/04/2022 17     TSH (mIU/L)   Date Value   11/04/2021 7.58 (H)     BP Readings from Last 3 Encounters:   01/05/22 136/88   11/10/21 (!) 155/83   11/08/21 130/64          (goal 120/80)    Past Medical History:   Diagnosis Date    CLL (chronic lymphocytic leukemia) ([facility] [address] 2004    Hyperlipidemia     Hypothyroidism     MRSA (methicillin resistant Staphylococcus aureus) infection       Past Surgical History:   Procedure Laterality Date    BREAST BIOPSY Right        Family History   Problem Relation Age of Onset    Dementia Mother     Other Father         CLL       Social History     Tobacco Use    Smoking status: Never Smoker    Smokeless tobacco: Never Used   Substance Use Topics    Alcohol use: Yes      Current Outpatient Medications   Medication Sig Dispense Refill    nystatin (MYCOSTATIN) 105135 UNIT/GM cream Apply topically 2 times daily. 30 g 2    fluconazole (DIFLUCAN) 100 MG tablet Take 1 tablet by mouth daily for 7 days 7 tablet 0    rosuvastatin (CRESTOR) 5 MG tablet Take 1 tablet by mouth daily 90 tablet 2    ramipril (ALTACE) 5 MG capsule Take 1 capsule by mouth daily 90 capsule 3    levothyroxine (SYNTHROID) 88 MCG tablet Take 1 tablet by mouth Daily 90 tablet 3     No current facility-administered medications for this visit. Allergies   Allergen Reactions    Penicillins Hives, Itching and Swelling    Epinephrine Other (See Comments)     Can tolerate lower doses, but if higher dose: will cause high heart rate. Health Maintenance   Topic Date Due    Hepatitis C screen  Never done    DTaP/Tdap/Td vaccine (1 - Tdap) Never done    Shingles Vaccine (1 of 2) Never done    Pneumococcal 65+ yrs at Risk Vaccine (2 of 2 - PCV13) 10/27/2010    Flu vaccine (1) 09/01/2021    COVID-19 Vaccine (4 - Booster for Pfizer series) 04/08/2022    Depression Screen  07/13/2022    Annual Wellness Visit (AWV)  07/14/2022    Lipid screen  11/04/2022    TSH testing  11/04/2022    Potassium monitoring  01/04/2023    Creatinine monitoring  01/04/2023    DEXA (modify frequency per FRAX score)  Completed    Hepatitis A vaccine  Aged Out    Hepatitis B vaccine  Aged Out    Hib vaccine  Aged Out    Meningococcal (ACWY) vaccine  Aged Out       Subjective:      Review of Systems   Constitutional: Negative for chills, fatigue and fever. HENT: Negative for congestion and sinus pain. Respiratory: Negative for cough and shortness of breath. Cardiovascular: Negative for palpitations and leg swelling. Gastrointestinal: Negative for constipation and diarrhea.    Musculoskeletal: Negative for back pain and gait problem. Skin: Positive for color change and rash. Psychiatric/Behavioral: Negative for dysphoric mood. The patient is not nervous/anxious. Objective:     /88   Pulse 61   Wt 143 lb 9.6 oz (65.1 kg)   SpO2 100%   BMI 27.29 kg/m²   Physical Exam  Vitals and nursing note reviewed. Constitutional:       Appearance: Normal appearance. HENT:      Head: Normocephalic and atraumatic. Right Ear: External ear normal.      Left Ear: External ear normal.   Cardiovascular:      Rate and Rhythm: Normal rate and regular rhythm. Heart sounds: Normal heart sounds. Pulmonary:      Breath sounds: Normal breath sounds. Abdominal:      General: Bowel sounds are normal.      Palpations: Abdomen is soft. Skin:     General: Skin is warm and dry. Neurological:      Mental Status: She is alert and oriented to person, place, and time. Psychiatric:         Mood and Affect: Mood normal.         Thought Content: Thought content normal.         Assessment/Plan:   1. Vaginal yeast infection  -     nystatin (MYCOSTATIN) 054525 UNIT/GM cream; Apply topically 2 times daily. , Disp-30 g, R-2, Normal     Apply nystatin cream 2x/day until discharge and irritation resolved. Return if symptoms worsen or fail to improve. No orders of the defined types were placed in this encounter. Orders Placed This Encounter   Medications    nystatin (MYCOSTATIN) 395929 UNIT/GM cream     Sig: Apply topically 2 times daily. Dispense:  30 g     Refill:  2       Patient given educational materials - see patient instructions. Discussed use, benefit, and side effects of prescribed medications. All patient questions answered. Pt voiced understanding. Reviewed health maintenance. Instructed to continue current medications, diet and exercise. Patient agreed with treatment plan. Follow up as directed.      Electronically signed by KIRA Schultz CNP on 1/6/2022 at 8:38 PM

## 2022-01-06 ASSESSMENT — ENCOUNTER SYMPTOMS
BACK PAIN: 0
COLOR CHANGE: 1
COUGH: 0
SINUS PAIN: 0
CONSTIPATION: 0
DIARRHEA: 0
SHORTNESS OF BREATH: 0

## 2022-01-11 ENCOUNTER — HOSPITAL ENCOUNTER (OUTPATIENT)
Age: 77
Setting detail: SPECIMEN
Discharge: HOME | End: 2022-01-11

## 2022-01-11 ENCOUNTER — OFFICE VISIT (OUTPATIENT)
Dept: OBGYN CLINIC | Age: 77
End: 2022-01-11
Payer: MEDICARE

## 2022-01-11 VITALS — SYSTOLIC BLOOD PRESSURE: 122 MMHG | WEIGHT: 143 LBS | DIASTOLIC BLOOD PRESSURE: 80 MMHG | BODY MASS INDEX: 27.17 KG/M2

## 2022-01-11 DIAGNOSIS — N93.9 VAGINAL BLEEDING: ICD-10-CM

## 2022-01-11 DIAGNOSIS — R10.2 PELVIC PRESSURE IN FEMALE: ICD-10-CM

## 2022-01-11 DIAGNOSIS — N89.8 VAGINAL IRRITATION: ICD-10-CM

## 2022-01-11 DIAGNOSIS — Z01.419 WELL WOMAN EXAM WITH ROUTINE GYNECOLOGICAL EXAM: ICD-10-CM

## 2022-01-11 DIAGNOSIS — N89.8 VAGINAL DISCHARGE: Primary | ICD-10-CM

## 2022-01-11 PROCEDURE — G8417 CALC BMI ABV UP PARAM F/U: HCPCS | Performed by: NURSE PRACTITIONER

## 2022-01-11 PROCEDURE — 1090F PRES/ABSN URINE INCON ASSESS: CPT | Performed by: NURSE PRACTITIONER

## 2022-01-11 PROCEDURE — 99203 OFFICE O/P NEW LOW 30 MIN: CPT | Performed by: NURSE PRACTITIONER

## 2022-01-11 PROCEDURE — 1123F ACP DISCUSS/DSCN MKR DOCD: CPT | Performed by: NURSE PRACTITIONER

## 2022-01-11 PROCEDURE — 1036F TOBACCO NON-USER: CPT | Performed by: NURSE PRACTITIONER

## 2022-01-11 PROCEDURE — G8484 FLU IMMUNIZE NO ADMIN: HCPCS | Performed by: NURSE PRACTITIONER

## 2022-01-11 PROCEDURE — 4040F PNEUMOC VAC/ADMIN/RCVD: CPT | Performed by: NURSE PRACTITIONER

## 2022-01-11 PROCEDURE — G8427 DOCREV CUR MEDS BY ELIG CLIN: HCPCS | Performed by: NURSE PRACTITIONER

## 2022-01-11 PROCEDURE — G8400 PT W/DXA NO RESULTS DOC: HCPCS | Performed by: NURSE PRACTITIONER

## 2022-01-11 ASSESSMENT — ENCOUNTER SYMPTOMS
ABDOMINAL PAIN: 0
ABDOMINAL DISTENTION: 0
SHORTNESS OF BREATH: 0
DIARRHEA: 0
COLOR CHANGE: 0
NAUSEA: 0
CONSTIPATION: 0
WHEEZING: 0
VOMITING: 0

## 2022-01-11 NOTE — PROGRESS NOTES
[address] [facility]  [address]  [address] [address]  Dept: [phone]  Dept Fax: [phone]    Celena De La Fuente is a 64 y.o. female who presents today for her medical conditions/complaintsas noted below. Celena De La Fuente is c/o of Vaginal Discharge (irritation) and New Patient        HPI:     HPI  Pt is here with complaints of a \"watery\" vaginal discharge and vaginal irritation pain. She states that looked with mirror and noted redness to opening of vagina. She states has had symptoms since end of 2021 she called PCP and they were unable to get her in d/t holidays she states so they called her in diflucan for 1 week. She states took entire script and went to follow up with PCP on 22. She states that discharge had seemed to improve slightly but  Is still having it and irritation/pain to vaginal area. She states they gave her a Nystatin cream but it was more inside that she noted redness and irritation she states feels like it is inside her vagina. She states has noted  A pink tinge at times to d/c but denies bright red blood. She denies pelvic pain but does states she has noted recently some increased pressure she states in her vaginal area especially when she wakes up in morning. She denies UTI sx, denies bowel habit changes or bloating. no  fevers, chills, nausea/vomiting. No recent antibiotics, changes in soaps. She uses replens regularly for vaginal dryness has used for years though and never had an issue  No change in partners- has not been sexually active with  since 2019 d/t his medical issues. LMP- around age [age]- postmenopausal.   Menarche age 13      She has hx CLL high cholesterol and hypothyroidism. Denies family hx of uterine, ovarian, breast or colon cancer.    LAst mammogram: 2021  Last dexa scan- 2017-osteopenia  LAst Pap: states in 2018 and was states was negative. Admits to abnormal PAP and colp in 1990s. HRT usage: prempro for 6 months when first postmenopausal in her 46s then stopped d/t having to have breast biopsy   Hx of obesity: no  Hx of DM: YES/NO: no  Hx of tamoxifen usage:no  HX radiation therapy:no                Past Medical History:   Diagnosis Date    [facility]ch[facility]ocytic [facility]) [facility] [address] 2004    Hyperlipidemia     Hypothyroidism     MRSA (methicillin resistant Staphylococcus aureus) infection       Past Surgical History:   Procedure Laterality Date    BREAST BIOPSY Right        Family History   Problem Relation Age of Onset    Dementia Mother     Other Father         CLL       Social History     Tobacco Use    Smoking status: Never Smoker    Smokeless tobacco: Never Used   Substance Use Topics    Alcohol use: Yes      Current Outpatient Medications   Medication Sig Dispense Refill    nystatin (MYCOSTATIN) 170227 UNIT/GM cream Apply topically 2 times daily. 30 g 2    rosuvastatin (CRESTOR) 5 MG tablet Take 1 tablet by mouth daily 90 tablet 2    ramipril (ALTACE) 5 MG capsule Take 1 capsule by mouth daily 90 capsule 3    levothyroxine (SYNTHROID) 88 MCG tablet Take 1 tablet by mouth Daily 90 tablet 3     No current facility-administered medications for this visit. Allergies   Allergen Reactions    Penicillins Hives, Itching and Swelling    Epinephrine Other (See Comments)     Can tolerate lower doses, but if higher dose: will cause high heart rate.         Health Maintenance   Topic Date Due    Hepatitis C screen  Never done    DTaP/Tdap/Td vaccine (1 - Tdap) Never done    Shingles Vaccine (1 of 2) Never done    Pneumococcal 65+ yrs at Risk Vaccine (2 of 2 - PCV13) 10/27/2010    Flu vaccine (1) 09/01/2021    COVID-19 Vaccine (4 - Booster for Pfizer series) 04/08/2022    Depression Screen  07/13/2022    Annual Wellness Visit (AWV)  07/14/2022    Lipid screen  11/04/2022    TSH testing  11/04/2022    Potassium monitoring  01/04/2023    Creatinine monitoring  01/04/2023    DEXA (modify frequency per FRAX score)  Completed    Hepatitis A vaccine  Aged Out    Hepatitis B vaccine  Aged Out    Hib vaccine  Aged Out    Meningococcal (ACWY) vaccine  Aged Out       Subjective:     Review of Systems   Constitutional: Negative for activity change, appetite change, chills, fever and unexpected weight change. Respiratory: Negative for shortness of breath and wheezing. Cardiovascular: Negative for chest pain and leg swelling. Gastrointestinal: Negative for abdominal distention, abdominal pain, constipation, diarrhea, nausea and vomiting. Genitourinary: Positive for vaginal bleeding, vaginal discharge and vaginal pain. Negative for difficulty urinating, dysuria, flank pain, frequency, genital sores, hematuria, pelvic pain (pressure) and urgency. Skin: Negative for color change and pallor. Neurological: Negative for dizziness and headaches. Hematological: Negative for adenopathy. Does not bruise/bleed easily. Psychiatric/Behavioral: Negative for self-injury and suicidal ideas. Objective:     Physical Exam  Vitals and nursing note reviewed. Constitutional:       General: She is not in acute distress. Appearance: She is well-developed. She is not diaphoretic. HENT:      Head: Normocephalic and atraumatic. Right Ear: External ear normal.      Left Ear: External ear normal.      Nose: Nose normal.   Eyes:      Pupils: Pupils are equal, round, and reactive to light. Neck:      Thyroid: No thyromegaly. Cardiovascular:      Rate and Rhythm: Normal rate and regular rhythm. Heart sounds: Normal heart sounds. No murmur heard. No friction rub. No gallop. Pulmonary:      Effort: Pulmonary effort is normal.      Breath sounds: Normal breath sounds. No wheezing. Abdominal:      General: Bowel sounds are normal.      Palpations: Abdomen is soft. Tenderness: There is no abdominal tenderness. Genitourinary:     Labia:         Right: Tenderness present. Left: Tenderness present. Vagina: Vaginal discharge, erythema, tenderness and bleeding present. Comments: unable to completely insert speculum or clearly visualize cervix. Musculoskeletal:         General: Normal range of motion. Cervical back: Normal range of motion and neck supple. Lymphadenopathy:      Cervical: No cervical adenopathy. Skin:     General: Skin is warm and dry. Findings: No rash. Neurological:      Mental Status: She is alert and oriented to person, place, and time. Cranial Nerves: No cranial nerve deficit. Psychiatric:         Behavior: Behavior normal.         Thought Content: Thought content normal.         Judgment: Judgment normal.       /80 (Site: Right Upper Arm, Position: Sitting, Cuff Size: Small Adult)   Wt 143 lb (64.9 kg)   BMI 27.17 kg/m²     Assessment:       Diagnosis Orders   1. Vaginal discharge  VAGINITIS DNA PROBE    Strep B screen   2. Vaginal irritation  VAGINITIS DNA PROBE    Strep B screen    CT ABDOMEN PELVIS W WO CONTRAST Additional Contrast? Radiologist Recommendation   3. Well woman exam with routine gynecological exam  PAP SMEAR   4. Pelvic pressure in female  US PELVIS COMPLETE    CT ABDOMEN PELVIS W WO CONTRAST Additional Contrast? Radiologist Recommendation   5. Vaginal bleeding  US PELVIS COMPLETE    CT ABDOMEN PELVIS W WO CONTRAST Additional Contrast? Radiologist Recommendation       Pelvic exam completed cultures obtained and sent    Plan:      Vaginal discharge- check cultures tx if positive. Check pap smear. Difficulty with Pelvic exam and ability to visualized cervix, had pressure when attempting to insert speculum unable to advance speculum completely, switched to pediatric speculum still difficult to insert speculum and unable to completely insert or clearly visualize cervix.   Discussed possibility of swelling, mass, malignancy vs prolapsed uterus/bladder? There was moderate  watery discharge mixed with blood. Digital exam able to insert one digit partilally. Pelvic US ordered in office today and unable visualize uterus or ovaries per US tech. Informed pt could be related age/gas/stool, although given symptoms and exam recommend Ct abdomen/pelvis as soon as possible and will notify of results. Given unable preform complete pelvic  Exam and even on transvaginal unable visualize uterus. Denies consitutional symptoms. Monitor for fevers, chills, n/v, vaginal bleeding abdominal/pelvic let us know if occurs. Return in about 2 weeks (around 1/25/2022). Orders Placed This Encounter   Procedures    VAGINITIS DNA PROBE    Strep B screen     Perform susceptibility testing for clindamycin and erythromycin. The patient is penicillin allergic.  US PELVIS COMPLETE     Standing Status:   Future     Standing Expiration Date:   1/12/2023     Order Specific Question:   Reason for exam:     Answer:   pelvic pressure, bleeding, irritation    CT ABDOMEN PELVIS W WO CONTRAST Additional Contrast? Radiologist Recommendation     Standing Status:   Future     Standing Expiration Date:   1/11/2023     Order Specific Question:   Additional Contrast?     Answer:   Radiologist Recommendation     Order Specific Question:   Reason for exam:     Answer:   pelvic/vaginal pressure, difficult insert speculum with exam- mass/prolapse? inability visualize uterus on US    PAP SMEAR     Patient History:    No LMP recorded. Patient is postmenopausal.  OBGYN Status: Postmenopausal  Past Surgical History:  No date: BREAST BIOPSY; Right      Social History    Tobacco Use      Smoking status: Never Smoker      Smokeless tobacco: Never Used       Standing Status:   Future     Standing Expiration Date:   1/12/2023     Order Specific Question:   Collection Type     Answer: Thin Prep     Order Specific Question:   Prior Abnormal Pap Test     Answer:    No

## 2022-01-12 LAB
CANDIDA SPECIES, DNA PROBE: NEGATIVE
GARDNERELLA VAGINALIS, DNA PROBE: NEGATIVE
SOURCE: NORMAL
TRICHOMONAS VAGINALIS DNA: NEGATIVE

## 2022-01-13 LAB
HPV SAMPLE: NORMAL
HPV, GENOTYPE 16: NOT DETECTED
HPV, GENOTYPE 18: NOT DETECTED
HPV, HIGH RISK OTHER: NOT DETECTED
HPV, INTERPRETATION: NORMAL
SPECIMEN DESCRIPTION: NORMAL

## 2022-01-14 ENCOUNTER — HOSPITAL ENCOUNTER (OUTPATIENT)
Dept: CT IMAGING | Age: 77
Discharge: HOME | End: 2022-01-16
Payer: MEDICARE

## 2022-01-14 DIAGNOSIS — N93.9 VAGINAL BLEEDING: ICD-10-CM

## 2022-01-14 DIAGNOSIS — N89.8 VAGINAL IRRITATION: ICD-10-CM

## 2022-01-14 DIAGNOSIS — R10.2 PELVIC PRESSURE IN FEMALE: ICD-10-CM

## 2022-01-14 LAB
CULTURE: NORMAL
Lab: NORMAL
SPECIMEN DESCRIPTION: NORMAL

## 2022-01-14 PROCEDURE — 6360000004 HC RX CONTRAST MEDICATION: Performed by: NURSE PRACTITIONER

## 2022-01-14 PROCEDURE — 74177 CT ABD & PELVIS W/CONTRAST: CPT

## 2022-01-14 PROCEDURE — 2580000003 HC RX 258: Performed by: NURSE PRACTITIONER

## 2022-01-14 RX ORDER — 0.9 % SODIUM CHLORIDE 0.9 %
80 INTRAVENOUS SOLUTION INTRAVENOUS ONCE
Status: COMPLETED | OUTPATIENT
Start: 2022-01-14 | End: 2022-01-14

## 2022-01-14 RX ORDER — SODIUM CHLORIDE 0.9 % (FLUSH) 0.9 %
10 SYRINGE (ML) INJECTION PRN
Status: DISCONTINUED | OUTPATIENT
Start: 2022-01-14 | End: 2022-01-17 | Stop reason: HOSPADM

## 2022-01-14 RX ADMIN — SODIUM CHLORIDE 80 ML: 9 INJECTION, SOLUTION INTRAVENOUS at 16:03

## 2022-01-14 RX ADMIN — IOHEXOL 50 ML: 240 INJECTION, SOLUTION INTRATHECAL; INTRAVASCULAR; INTRAVENOUS; ORAL at 16:04

## 2022-01-14 RX ADMIN — SODIUM CHLORIDE, PRESERVATIVE FREE 10 ML: 5 INJECTION INTRAVENOUS at 16:03

## 2022-01-14 RX ADMIN — IOPAMIDOL 75 ML: 755 INJECTION, SOLUTION INTRAVENOUS at 16:02

## 2022-01-19 LAB — CYTOLOGY REPORT: NORMAL

## 2022-01-21 ENCOUNTER — NURSE ONLY (OUTPATIENT)
Dept: OBGYN CLINIC | Age: 77
End: 2022-01-21

## 2022-01-21 ENCOUNTER — HOSPITAL ENCOUNTER (OUTPATIENT)
Age: 77
Setting detail: SPECIMEN
Discharge: HOME | End: 2022-01-21

## 2022-01-21 DIAGNOSIS — N89.8 VAGINAL DISCHARGE: Primary | ICD-10-CM

## 2022-01-22 DIAGNOSIS — N89.8 VAGINAL DISCHARGE: ICD-10-CM

## 2022-01-22 LAB
-: NORMAL
AMORPHOUS: NORMAL
BACTERIA: NORMAL
BILIRUBIN URINE: NEGATIVE
CASTS UA: NORMAL /LPF (ref 0–8)
COLOR: YELLOW
COMMENT UA: ABNORMAL
CRYSTALS, UA: NORMAL /HPF
EPITHELIAL CELLS UA: NORMAL /HPF (ref 0–5)
GLUCOSE URINE: NEGATIVE
KETONES, URINE: NEGATIVE
LEUKOCYTE ESTERASE, URINE: ABNORMAL
MUCUS: NORMAL
NITRITE, URINE: NEGATIVE
OTHER OBSERVATIONS UA: NORMAL
PH UA: 7 (ref 5–8)
PROTEIN UA: NEGATIVE
RBC UA: NORMAL /HPF (ref 0–4)
RENAL EPITHELIAL, UA: NORMAL /HPF
SPECIFIC GRAVITY UA: 1.01 (ref 1–1.03)
TRICHOMONAS: NORMAL
TURBIDITY: CLEAR
URINE HGB: ABNORMAL
UROBILINOGEN, URINE: NORMAL
WBC UA: NORMAL /HPF (ref 0–5)
YEAST: NORMAL

## 2022-01-23 LAB
CULTURE: NO GROWTH
Lab: NORMAL
SPECIMEN DESCRIPTION: NORMAL

## 2022-01-27 ENCOUNTER — INITIAL CONSULT (OUTPATIENT)
Dept: OBGYN CLINIC | Age: 77
End: 2022-01-27
Payer: MEDICARE

## 2022-01-27 VITALS — DIASTOLIC BLOOD PRESSURE: 64 MMHG | SYSTOLIC BLOOD PRESSURE: 126 MMHG

## 2022-01-27 DIAGNOSIS — R10.2 PELVIC PRESSURE IN FEMALE: ICD-10-CM

## 2022-01-27 DIAGNOSIS — N89.8 VAGINAL DISCHARGE: ICD-10-CM

## 2022-01-27 DIAGNOSIS — N93.9 VAGINAL BLEEDING: Primary | ICD-10-CM

## 2022-01-27 DIAGNOSIS — R93.89 THICKENED ENDOMETRIUM: ICD-10-CM

## 2022-01-27 PROCEDURE — 1036F TOBACCO NON-USER: CPT | Performed by: OBSTETRICS & GYNECOLOGY

## 2022-01-27 PROCEDURE — G8484 FLU IMMUNIZE NO ADMIN: HCPCS | Performed by: OBSTETRICS & GYNECOLOGY

## 2022-01-27 PROCEDURE — 4040F PNEUMOC VAC/ADMIN/RCVD: CPT | Performed by: OBSTETRICS & GYNECOLOGY

## 2022-01-27 PROCEDURE — G8417 CALC BMI ABV UP PARAM F/U: HCPCS | Performed by: OBSTETRICS & GYNECOLOGY

## 2022-01-27 PROCEDURE — 1123F ACP DISCUSS/DSCN MKR DOCD: CPT | Performed by: OBSTETRICS & GYNECOLOGY

## 2022-01-27 PROCEDURE — 99213 OFFICE O/P EST LOW 20 MIN: CPT | Performed by: OBSTETRICS & GYNECOLOGY

## 2022-01-27 PROCEDURE — G8428 CUR MEDS NOT DOCUMENT: HCPCS | Performed by: OBSTETRICS & GYNECOLOGY

## 2022-01-27 PROCEDURE — G8400 PT W/DXA NO RESULTS DOC: HCPCS | Performed by: OBSTETRICS & GYNECOLOGY

## 2022-01-27 PROCEDURE — 1090F PRES/ABSN URINE INCON ASSESS: CPT | Performed by: OBSTETRICS & GYNECOLOGY

## 2022-01-27 NOTE — PROGRESS NOTES
Mich Ordonez  2022    YOB: 1945    The patient was seen today. She is here regarding abnormal exam and CT scan. Discuss options. Her bowels are regular and she is voiding without difficulty. HPI:  Mich Ordonez is a 64 y.o. female      Pt. See and examined. HPI from 2022:    \"Pt is here with complaints of a \"watery\" vaginal discharge and vaginal irritation pain. She states that looked with mirror and noted redness to opening of vagina. She states has had symptoms since end of 2021 she called PCP and they were unable to get her in d/t holidays she states so they called her in diflucan for 1 week. She states took entire script and went to follow up with PCP on 22. She states that discharge had seemed to improve slightly but  Is still having it and irritation/pain to vaginal area. She states they gave her a Nystatin cream but it was more inside that she noted redness and irritation she states feels like it is inside her vagina. She states has noted  A pink tinge at times to d/c but denies bright red blood. She denies pelvic pain but does states she has noted recently some increased pressure she states in her vaginal area especially when she wakes up in morning.         She denies UTI sx, denies bowel habit changes or bloating. no  fevers, chills, nausea/vomiting. No recent antibiotics, changes in soaps. She uses replens regularly for vaginal dryness has used for years though and never had an issue  No change in partners- has not been sexually active with  since 2019 d/t his medical issues. \"    Since that time she has had ultrasound that is inconclusive. CT scan completed and below    EXAMINATION:   CT OF THE ABDOMEN AND PELVIS WITH CONTRAST 2022 2:47 pm       TECHNIQUE:   CT of the abdomen and pelvis was performed with the administration of   intravenous contrast. Multiplanar reformatted images are provided for review.    Dose modulation, iterative reconstruction, and/or weight based adjustment of   the mA/kV was utilized to reduce the radiation dose to as low as reasonably   achievable.       COMPARISON:   None.       HISTORY:   ORDERING SYSTEM PROVIDED HISTORY: Vaginal irritation   TECHNOLOGIST PROVIDED HISTORY:       pelvic/vaginal pressure, difficult insert speculum with exam- mass/prolapse? inability visualize uterus on US   Reason for Exam: pelvic/vaginal pressure, difficult insert speculum with   exam- mass/prolapse? inability visualize uterus on US   Additional signs and symptoms: vag irritation       FINDINGS:   Lower Chest: Mild basilar atelectasis is noted.  Small hiatal hernia.       Organs: Spleen is top-normal in size without focal mass.  Liver, pancreas,   gallbladder, adrenals, and kidneys demonstrate no acute abnormality although   a 14 mm cyst is present in the right kidney.       GI/Bowel: No free fluid, free air or bowel obstruction is noted.  Small   periumbilical fat containing hernia without strangulation is present.  A few   colonic diverticula are present without acute diverticulitis.       Pelvis: Appendix is visualized.  There is no appendicitis.  Bladder   demonstrates no gross abnormality.  Uterus is abnormal in appearance. Endometrial cavity is thickened to 12 x 16 mm suggestive of endometrial   pathology given age. Donna Coronado free pelvic fluid, pelvic or inguinal adenopathy is   noted. Ephriam Espino is air in the vagina.  Fistulous formation cannot be excluded   on this noncontrast study.       Peritoneum/Retroperitoneum: No aortic aneurysm, retroperitoneal or mesenteric   adenopathy is noted.       Bones/Soft Tissues: No acute osseous or soft tissue abnormality.  Facet   arthropathy lower lumbar spine is noted.           Impression   1.  Abnormal endometrial cavity thickness suspicious for endometrial   pathology.       2.  There is air in the vaginal canal which is irregular.  Fistulous   formation not excluded.  Vaginal mass is difficult to exclude due to   heterogeneity.       RECOMMENDATIONS:   Correlation with outside pelvic ultrasound advised, images and report not   available to us. [facility] imaging may prove helpful for further assessment. She continues to have persistent watery discharge and pelvic pressure. Denies any changes in bowel or bladder function. Discussed options and further imaging. All R/B/A discussed in detail including GYN ONC evaluation, exam today, EUA, and vaginal estrogen for atrophy and vaginitis. Discussed with her I would recommend tissue biopsy or GYN ONC evaluation before moving forward with medication. She has history of CLL and HTN as well as hypothyroidism. OB History    Para Term  AB Living   2 2 2 0 0 0   SAB IAB Ectopic Molar Multiple Live Births   0 0 0 0 0 0      # Outcome Date GA Lbr Julian/2nd Weight Sex Delivery Anes PTL Lv   2 Term            1 Term                Past Medical History:   Diagnosis Date    CLL [facility]chronic lymphocytic leukemia) ([facility] [address]     Hyperlipidemia     Hypothyroidism     MRSA (methicillin resistant Staphylococcus aureus) infection        Past Surgical History:   Procedure Laterality Date    BREAST BIOPSY Right     TUBAL LIGATION         Family History   Problem Relation Age of Onset    Dementia Mother     Other Father         CLL       Social History     Socioeconomic History    Marital status:      Spouse name: Not on file    Number of children: Not on file    Years of education: Not on file    Highest education level: Not on file   Occupational History    Not on file   Tobacco Use    Smoking status: Never Smoker    Smokeless tobacco: Never Used   Vaping Use    Vaping Use: Never used   Substance and Sexual Activity    Alcohol use:  Yes    Drug use: Never    Sexual activity: Not on file   Other Topics Concern    Not on file   Social History Narrative    Not on file     Social Determinants of Health REVIEW OF SYSTEMS:       A minimum of an eleven point review of systems was completed. Review Of Systems (11 point):  Constitutional: No fever, chills or malaise; No weight change or fatigue  Head and Eyes: No vision, Headache, Dizziness or trauma in last 12 months  ENT ROS: No hearing, Tinnitis, sinus or taste problems  Hematological and Lymphatic ROS:No Lymphoma, Von Willebrand's, Hemophillia or Bleeding History  Psych ROS: No Depression, Homicidal thoughts,suicidal thoughts, or anxiety  Breast ROS: No prior breast abnormalities or lumps  Respiratory ROS: No SOB, Pneumoniae,Cough, or Pulmonary Embolism History  Cardiovascular ROS: No Chest Pain with Exertion, Palpitations, Syncope, Edema, Arrhythmia  Gastrointestinal ROS: No Indigestion, Heartburn, Nausea, vomiting, Diarrhea, Constipation,or Bowel Changes; No Bloody Stools or melena  Genito-Urinary ROS: No Dysuria, Hematuria or Nocturia. No Urinary Incontinence +vaginal discharge +pelvic pressure  Musculoskeletal ROS: No Arthralgia, Arthritis,Gout,Osteoporosis or Rheumatism  Neurological ROS: No CVA, Migraines, Epilepsy, Seizure Hx, or Limb Weakness  Dermatological ROS: No Rash, Itching, Hives, Mole Changes or Cancer          Blood pressure 126/64, not currently breastfeeding. Abdomen: Soft non-tender; good bowel sounds. No guarding, rebound or rigidity. No CVA tenderness bilaterally. Extremities: No calf tenderness, DTR 2/4, and No edema bilaterally    Pelvic:  Pt.  Refused, she is requesting exam under anesthesia with indicated biopsies    Diagnostics:  US NON OB TRANSVAGINAL    Result Date: 1/13/2022  Dx: Vaginal Pressure Uterus: Uterus not visualized on ultrasound Bilateral ovaries not visualized No free fluid in pelvis    US PELVIS COMPLETE    Result Date: 1/13/2022  Dx: Vaginal Pressure Uterus: Uterus not visualized on ultrasound Bilateral ovaries not visualized No free fluid in pelvis    CT ABDOMEN PELVIS W IV CONTRAST Additional Contrast? Radiologist Recommendation    Addendum Date: 1/17/2022    ADDENDUM: Outside office images and reports added no significant additional information. No gross abnormalities are demonstrated on outside study. Result Date: 1/17/2022  EXAMINATION: CT OF THE ABDOMEN AND PELVIS WITH CONTRAST 1/14/2022 2:47 pm TECHNIQUE: CT of the abdomen and pelvis was performed with the administration of intravenous contrast. Multiplanar reformatted images are provided for review. Dose modulation, iterative reconstruction, and/or weight based adjustment of the mA/kV was utilized to reduce the radiation dose to as low as reasonably achievable. COMPARISON: None. HISTORY: ORDERING SYSTEM PROVIDED HISTORY: Vaginal irritation TECHNOLOGIST PROVIDED HISTORY: pelvic/vaginal pressure, difficult insert speculum with exam- mass/prolapse? inability visualize uterus on US Reason for Exam: pelvic/vaginal pressure, difficult insert speculum with exam- mass/prolapse? inability visualize uterus on US Additional signs and symptoms: vag irritation FINDINGS: Lower Chest: Mild basilar atelectasis is noted. Small hiatal hernia. Organs: Spleen is top-normal in size without focal mass. Liver, pancreas, gallbladder, adrenals, and kidneys demonstrate no acute abnormality although a 14 mm cyst is present in the right kidney. GI/Bowel: No free fluid, free air or bowel obstruction is noted. Small periumbilical fat containing hernia without strangulation is present. A few colonic diverticula are present without acute diverticulitis. Pelvis: Appendix is visualized. There is no appendicitis. Bladder demonstrates no gross abnormality. Uterus is abnormal in appearance. Endometrial cavity is thickened to 12 x 16 mm suggestive of endometrial pathology given age. No free pelvic fluid, pelvic or inguinal adenopathy is noted. There is air in the vagina. Fistulous formation cannot be excluded on this noncontrast study.  Peritoneum/Retroperitoneum: No aortic aneurysm, retroperitoneal or mesenteric adenopathy is noted. Bones/Soft Tissues: No acute osseous or soft tissue abnormality. Facet arthropathy lower lumbar spine is noted. 1.  Abnormal endometrial cavity thickness suspicious for endometrial pathology. 2.  There is air in the vaginal canal which is irregular. Fistulous formation not excluded. Vaginal mass is difficult to exclude due to heterogeneity. RECOMMENDATIONS: Correlation with outside pelvic ultrasound advised, images and report not available to us. MRI imaging may prove helpful for further assessment. IRON SHAYE DIGITAL SCREEN SELF REFERRAL W OR WO CAD BILATERAL    Result Date: 1/4/2022  EXAMINATION: SCREENING DIGITAL BILATERAL  MAMMOGRAM WITH TOMOSYNTHESIS, 1/4/2022 TECHNIQUE: Screening mammography of the bilateral breasts was performed with tomosynthesis. 2D standard and 3D tomosynthesis combination imaging performed through both breasts in the MLO and CC projection. Computer aided detection was utilized in the interpretation of this exam. COMPARISON: 12/14/2020, 10/03/2019, 06/21/2018 HISTORY: Screening. FINDINGS: The breast tissue is composed of scattered fibroglandular tissue. There is no suspicious mass, suspicious microcalcification, or area of architectural distortion. No mammographic evidence of malignancy. BI-RADS 1 BIRADS: BIRADS - CATEGORY 1 Negative, no evidence of malignancy. Normal interval follow-up is recommended in 12 months. OVERALL ASSESSMENT - NEGATIVE A letter of notification will be sent to the patient regarding the results. The [employer] recommends annual mammograms for women 40 years and older. Lab Results:  Results for orders placed or performed during the hospital encounter of 01/21/22   Culture, Urine    Specimen: Urine Random   Result Value Ref Range    Specimen Description . Random Urine     Special Requests NOT REPORTED     Culture NO GROWTH    Urinalysis   Result Value Ref Range Color, UA Yellow Yellow    Turbidity UA Clear Clear    Glucose, Ur NEGATIVE NEGATIVE    Bilirubin Urine NEGATIVE NEGATIVE    Ketones, Urine NEGATIVE NEGATIVE    Specific Gravity, UA 1.006 1.005 - 1.030    Urine Hgb SMALL (A) NEGATIVE    pH, UA 7.0 5.0 - 8.0    Protein, UA NEGATIVE NEGATIVE    Urobilinogen, Urine Normal Normal    Nitrite, Urine NEGATIVE NEGATIVE    Leukocyte Esterase, Urine MODERATE (A) NEGATIVE    Urinalysis Comments NOT REPORTED    Microscopic Urinalysis   Result Value Ref Range    -          WBC, UA 5 TO 10 0 - 5 /HPF    RBC, UA 2 TO 5 0 - 4 /HPF    Casts UA NOT REPORTED 0 - 8 /LPF    Crystals, UA NOT REPORTED None /HPF    Epithelial Cells UA 0 TO 2 0 - 5 /HPF    Renal Epithelial, UA NOT REPORTED 0 /HPF    Bacteria, UA NOT REPORTED None    Mucus, UA NOT REPORTED None    Trichomonas, UA NOT REPORTED None    Amorphous, UA NOT REPORTED None    Other Observations UA NOT REPORTED NOT REQ. Yeast, UA NOT REPORTED None         Assessment:   Diagnosis Orders   1. Vaginal bleeding  US NON OB TRANSVAGINAL    US PELVIS COMPLETE   2. Pelvic pressure in female  US NON OB TRANSVAGINAL    US PELVIS COMPLETE   3. Vaginal discharge  US NON OB TRANSVAGINAL    US PELVIS COMPLETE   4. Thickened endometrium  US NON OB TRANSVAGINAL    US PELVIS COMPLETE     Patient Active Problem List    Diagnosis Date Noted    Hypogammaglobulinemia ([address] 01/12/2021    Thrombocytopenia ([address] 01/06/2020    Mixed hyperlipidemia 04/15/2019    Hypothyroidism 04/15/2019    Essential hypertension 04/15/2019    CLL (chronic lymphocytic leukemia) ([facility] [address] 04/15/2019    History of MRSA infection 04/15/2019         PLAN:  Return in about 6 weeks (around 3/10/2022) for post op. EUA, Hscope, D&C, Myosure, cervical biopsy, ECC  Gyn Onc referral offered, in office exam offered, and patient is electing to proceed with EUA. Repeat pelvic ultrasound  Repeat Annual every 1 year  Cervical Cytology Evaluation begins at 24years old.   If Negative Cytology, Follow-up screening per current guidelines. Counseled on preventative health maintenance follow-up. Orders Placed This Encounter   Procedures    US NON OB TRANSVAGINAL     This procedure can be scheduled via [employer]. Access your [employer] account by visiting [email or web address]. Standing Status:   Future     Standing Expiration Date:   1/27/2023    US PELVIS COMPLETE     This procedure can be scheduled via [employer]. Access your [employer] account by visiting [email or web address]. Standing Status:   Future     Standing Expiration Date:   1/27/2023           The patient, Macario Deluna is a 64 y.o. female, was seen with a total time spent of 20 minutes for the visit on this date of service by the E/M provider. The time component had both face to face and non face to face time spent in determining the total time component. Counseling and education regarding her diagnosis listed below and her options regarding those diagnoses were also included in determining her time component. Diagnosis Orders   1. Vaginal bleeding  US NON OB TRANSVAGINAL    US PELVIS COMPLETE   2. Pelvic pressure in female  US NON OB TRANSVAGINAL    US PELVIS COMPLETE   3. Vaginal discharge  US NON OB TRANSVAGINAL    US PELVIS COMPLETE   4. Thickened endometrium  US NON OB TRANSVAGINAL    US PELVIS COMPLETE        The patient had her preventative health maintenance recommendations and follow-up reviewed with her at the completion of her visit.

## 2022-01-28 ENCOUNTER — HOSPITAL ENCOUNTER (OUTPATIENT)
Dept: ULTRASOUND IMAGING | Age: 77
Discharge: HOME | End: 2022-01-30
Payer: MEDICARE

## 2022-01-28 DIAGNOSIS — R10.2 PELVIC PRESSURE IN FEMALE: ICD-10-CM

## 2022-01-28 DIAGNOSIS — N89.8 VAGINAL DISCHARGE: ICD-10-CM

## 2022-01-28 DIAGNOSIS — R93.89 THICKENED ENDOMETRIUM: ICD-10-CM

## 2022-01-28 DIAGNOSIS — N93.9 VAGINAL BLEEDING: ICD-10-CM

## 2022-01-28 PROCEDURE — 76830 TRANSVAGINAL US NON-OB: CPT

## 2022-01-28 PROCEDURE — 76856 US EXAM PELVIC COMPLETE: CPT

## 2022-02-01 ENCOUNTER — PREP FOR PROCEDURE (OUTPATIENT)
Dept: OBGYN CLINIC | Age: 77
End: 2022-02-01
Payer: MEDICARE

## 2022-02-01 DIAGNOSIS — R93.89 ABNORMAL CT SCAN: ICD-10-CM

## 2022-02-01 DIAGNOSIS — N93.9 VAGINAL BLEEDING: ICD-10-CM

## 2022-02-01 DIAGNOSIS — R10.2 PELVIC PRESSURE IN FEMALE: ICD-10-CM

## 2022-02-01 DIAGNOSIS — R93.89 THICKENED ENDOMETRIUM: Primary | ICD-10-CM

## 2022-02-01 DIAGNOSIS — Z01.818 PRE-OP TESTING: Primary | ICD-10-CM

## 2022-02-01 PROCEDURE — 36415 COLL VENOUS BLD VENIPUNCTURE: CPT | Performed by: OBSTETRICS & GYNECOLOGY

## 2022-02-09 ENCOUNTER — OFFICE VISIT (OUTPATIENT)
Dept: PRIMARY CARE CLINIC | Age: 77
End: 2022-02-09
Payer: MEDICARE

## 2022-02-09 VITALS
WEIGHT: 143.5 LBS | SYSTOLIC BLOOD PRESSURE: 136 MMHG | HEART RATE: 77 BPM | OXYGEN SATURATION: 96 % | DIASTOLIC BLOOD PRESSURE: 84 MMHG | BODY MASS INDEX: 27.27 KG/M2

## 2022-02-09 DIAGNOSIS — Z01.818 PREOPERATIVE CLEARANCE: Primary | ICD-10-CM

## 2022-02-09 DIAGNOSIS — N89.8 VAGINAL DISCHARGE: ICD-10-CM

## 2022-02-09 PROCEDURE — 1036F TOBACCO NON-USER: CPT | Performed by: NURSE PRACTITIONER

## 2022-02-09 PROCEDURE — 4040F PNEUMOC VAC/ADMIN/RCVD: CPT | Performed by: NURSE PRACTITIONER

## 2022-02-09 PROCEDURE — G8417 CALC BMI ABV UP PARAM F/U: HCPCS | Performed by: NURSE PRACTITIONER

## 2022-02-09 PROCEDURE — 1090F PRES/ABSN URINE INCON ASSESS: CPT | Performed by: NURSE PRACTITIONER

## 2022-02-09 PROCEDURE — 99213 OFFICE O/P EST LOW 20 MIN: CPT | Performed by: NURSE PRACTITIONER

## 2022-02-09 PROCEDURE — 1123F ACP DISCUSS/DSCN MKR DOCD: CPT | Performed by: NURSE PRACTITIONER

## 2022-02-09 PROCEDURE — G8484 FLU IMMUNIZE NO ADMIN: HCPCS | Performed by: NURSE PRACTITIONER

## 2022-02-09 PROCEDURE — G8400 PT W/DXA NO RESULTS DOC: HCPCS | Performed by: NURSE PRACTITIONER

## 2022-02-09 PROCEDURE — G8427 DOCREV CUR MEDS BY ELIG CLIN: HCPCS | Performed by: NURSE PRACTITIONER

## 2022-02-09 ASSESSMENT — ENCOUNTER SYMPTOMS
EYE DISCHARGE: 0
EYE REDNESS: 0
RHINORRHEA: 0
NAUSEA: 0
WHEEZING: 0
SORE THROAT: 0
VOMITING: 0
COUGH: 0
SHORTNESS OF BREATH: 0
ABDOMINAL PAIN: 0
DIARRHEA: 0

## 2022-02-09 NOTE — PROGRESS NOTES
[id number] [address] PRIMARY CARE  [facility] [address]  [facility] [address] [place] [address]  Dept: 2 [place] Davison is a 64 y.o. female Established patient, who presents today for her medical conditions/complaints as noted below. Chief Complaint   Patient presents with   [place] Other     pt is having surgery on 03/01 with Dr. Ernesto Hernandez.       HPI:     HPI  Initially I treated her for a vaginal yeast infection due to some watery vaginal discharge and irritation. That was ineffective. She then had appointment with [facility] and there was some atypical cells. She had consultation with Dr. Ernesto Hernandez, who wanted to do CT abdomen and pelvis and then had transvaginal ultrasound. She was on hormone replacement once in her 50's for about 6 months and developed a lump in breast and immediately stopped. She still has discharge. Planned dilatation and curettage hysteroscopy and biopsy. Irritation is in lining of vagina. She is here today for clearance. No pain or burning with urination or increase frequency. She is feeling well and no signs or symptoms of an infection.      Reviewed prior notes OB/GYN  Reviewed previous Imaging1/14/22 - 1/28/22    LDL Cholesterol (mg/dL)   Date Value   11/04/2021 146 (H)   10/13/2020 103   10/02/2019 105       (goal LDL is <100)   AST (U/L)   Date Value   11/04/2021 26     ALT (U/L)   Date Value   11/04/2021 10     BUN (mg/dL)   Date Value   01/04/2022 17     TSH (mIU/L)   Date Value   11/04/2021 7.58 (H)     BP Readings from Last 3 Encounters:   02/09/22 136/84   01/27/22 126/64   01/11/22 122/80          (goal 120/80)    Past Medical History:   Diagnosis Date    CLL (chronic lymphocytic leukemia) ([facility] [address] 2004    Hyperlipidemia     Hypothyroidism     MRSA (methicillin resistant Staphylococcus aureus) infection       Past Surgical History:   Procedure Laterality Date    BREAST BIOPSY Right     TUBAL LIGATION         Family History   Problem Relation Age of Onset    Dementia Mother     Other Father         CLL       Social History     Tobacco Use    Smoking status: Never Smoker    Smokeless tobacco: Never Used   Substance Use Topics    Alcohol use: Yes      Current Outpatient Medications   Medication Sig Dispense Refill    rosuvastatin (CRESTOR) 5 MG tablet Take 1 tablet by mouth daily 90 tablet 2    ramipril (ALTACE) 5 MG capsule Take 1 capsule by mouth daily 90 capsule 3    levothyroxine (SYNTHROID) 88 MCG tablet Take 1 tablet by mouth Daily 90 tablet 3     No current facility-administered medications for this visit. Allergies   Allergen Reactions    Penicillins Hives, Itching and Swelling    Epinephrine Other (See Comments)     Can tolerate lower doses, but if higher dose: will cause high heart rate. Health Maintenance   Topic Date Due    Hepatitis C screen  Never done    DTaP/Tdap/Td vaccine (1 - Tdap) Never done    Shingles Vaccine (1 of 2) Never done    Pneumococcal 65+ yrs at Risk Vaccine (2 of 2 - PCV13) 10/27/2010    Flu vaccine (1) 09/01/2021    COVID-19 Vaccine (4 - Booster for Pfizer series) 03/08/2022    Depression Screen  07/13/2022    Annual Wellness Visit (AWV)  07/14/2022    Lipid screen  11/04/2022    TSH testing  11/04/2022    Potassium monitoring  01/04/2023    Creatinine monitoring  01/04/2023    DEXA (modify frequency per FRAX score)  Completed    Hepatitis A vaccine  Aged Out    Hepatitis B vaccine  Aged Out    Hib vaccine  Aged Out    Meningococcal (ACWY) vaccine  Aged Out       Subjective:      Review of Systems   Constitutional: Negative for chills, fatigue and fever. HENT: Negative for rhinorrhea and sore throat. Eyes: Negative for discharge and redness. Respiratory: Negative for cough, shortness of breath and wheezing. Cardiovascular: Negative for chest pain and palpitations. Gastrointestinal: Negative for abdominal pain, diarrhea, nausea and vomiting.    Genitourinary: Positive for vaginal discharge. Negative for difficulty urinating, dysuria, frequency, vaginal bleeding and vaginal pain. Musculoskeletal: Negative for arthralgias and myalgias. Neurological: Negative for dizziness, light-headedness and headaches. Psychiatric/Behavioral: Negative for sleep disturbance. Objective:     /84   Pulse 77   Wt 143 lb 8 oz (65.1 kg)   SpO2 96%   BMI 27.27 kg/m²   Physical Exam  Vitals and nursing note reviewed. Constitutional:       General: She is not in acute distress. Appearance: She is well-developed. She is not ill-appearing. HENT:      Head: Normocephalic and atraumatic. Right Ear: External ear normal.      Left Ear: External ear normal.   Eyes:      General: No scleral icterus. Right eye: No discharge. Left eye: No discharge. Conjunctiva/sclera: Conjunctivae normal.      Pupils: Pupils are equal, round, and reactive to light. Neck:      Thyroid: No thyromegaly. Trachea: No tracheal deviation. Cardiovascular:      Rate and Rhythm: Normal rate and regular rhythm. Heart sounds: Normal heart sounds. Comments: No carotid bruit  Pulmonary:      Effort: Pulmonary effort is normal. No respiratory distress. Breath sounds: Normal breath sounds. No wheezing. Abdominal:      General: Bowel sounds are normal.      Palpations: Abdomen is soft. Lymphadenopathy:      Cervical: No cervical adenopathy. Skin:     General: Skin is warm. Findings: No rash. Neurological:      Mental Status: She is alert and oriented to person, place, and time. Psychiatric:         Mood and Affect: Mood normal.         Behavior: Behavior normal.         Thought Content: Thought content normal.         Assessment/Plan:   1. Preoperative clearance  2. Vaginal discharge     Cleared for dilatation and curettage hysteroscopy and biopsy surgery with low risk related to HTN and age. Return if symptoms worsen or fail to improve.     No

## 2022-02-10 ENCOUNTER — TELEPHONE (OUTPATIENT)
Dept: PRIMARY CARE CLINIC | Age: 77
End: 2022-02-10

## 2022-02-11 ENCOUNTER — TELEPHONE (OUTPATIENT)
Dept: OBGYN CLINIC | Age: 77
End: 2022-02-11

## 2022-02-11 NOTE — TELEPHONE ENCOUNTER
----- Message from Rito Donohue DO sent at 2/4/2022  6:32 PM EST -----  Limited exam as expected, proceed with EUA, ECC, cervical biopsy, Hscope, D&C, Myosure under anesthesia

## 2022-02-11 NOTE — TELEPHONE ENCOUNTER
Left message for pt to call the office- no change between our ultrasound in office and this resent one- pt is scheduled for surgery on 3/1/22

## 2022-02-11 NOTE — TELEPHONE ENCOUNTER
Pt was made aware of her results- pt is wanting to make sure she does not have to have the 3rd ultrasound 1 week prior to surgery as this would be her second one and nothing has changed.

## 2022-02-15 ENCOUNTER — HOSPITAL ENCOUNTER (OUTPATIENT)
Dept: PREADMISSION TESTING | Age: 77
Discharge: HOME | End: 2022-02-19
Payer: MEDICARE

## 2022-02-15 VITALS
SYSTOLIC BLOOD PRESSURE: 160 MMHG | OXYGEN SATURATION: 100 % | HEART RATE: 87 BPM | DIASTOLIC BLOOD PRESSURE: 84 MMHG | TEMPERATURE: 97.7 F | WEIGHT: 143 LBS | BODY MASS INDEX: 26.31 KG/M2 | RESPIRATION RATE: 16 BRPM | HEIGHT: 62 IN

## 2022-02-15 DIAGNOSIS — Z01.818 PRE-OP TESTING: ICD-10-CM

## 2022-02-15 LAB
-: NORMAL
ALBUMIN SERPL-MCNC: 4.6 G/DL (ref 3.5–5.2)
ALBUMIN/GLOBULIN RATIO: 2.2 (ref 1–2.5)
ALP BLD-CCNC: 78 U/L (ref 35–104)
ALT SERPL-CCNC: 9 U/L (ref 5–33)
AMORPHOUS: NORMAL
ANION GAP SERPL CALCULATED.3IONS-SCNC: 17 MMOL/L (ref 9–17)
AST SERPL-CCNC: 25 U/L
BACTERIA: NORMAL
BILIRUB SERPL-MCNC: 0.59 MG/DL (ref 0.3–1.2)
BILIRUBIN URINE: NEGATIVE
BUN BLDV-MCNC: 13 MG/DL (ref 8–23)
BUN/CREAT BLD: ABNORMAL (ref 9–20)
CALCIUM SERPL-MCNC: 9.8 MG/DL (ref 8.6–10.4)
CASTS UA: NORMAL /LPF (ref 0–8)
CHLORIDE BLD-SCNC: 104 MMOL/L (ref 98–107)
CO2: 24 MMOL/L (ref 20–31)
COLOR: YELLOW
COMMENT UA: ABNORMAL
CREAT SERPL-MCNC: 0.69 MG/DL (ref 0.5–0.9)
CRYSTALS, UA: NORMAL /HPF
EPITHELIAL CELLS UA: NORMAL /HPF (ref 0–5)
GFR AFRICAN AMERICAN: >60 ML/MIN
GFR NON-AFRICAN AMERICAN: >60 ML/MIN
GFR SERPL CREATININE-BSD FRML MDRD: ABNORMAL ML/MIN/{1.73_M2}
GFR SERPL CREATININE-BSD FRML MDRD: ABNORMAL ML/MIN/{1.73_M2}
GLUCOSE BLD-MCNC: 93 MG/DL (ref 70–99)
GLUCOSE URINE: NEGATIVE
KETONES, URINE: NEGATIVE
LEUKOCYTE ESTERASE, URINE: NEGATIVE
MUCUS: NORMAL
NITRITE, URINE: NEGATIVE
OTHER OBSERVATIONS UA: NORMAL
PH UA: 6.5 (ref 5–8)
POTASSIUM SERPL-SCNC: 5.3 MMOL/L (ref 3.7–5.3)
PROTEIN UA: NEGATIVE
RBC UA: NORMAL /HPF (ref 0–4)
RENAL EPITHELIAL, UA: NORMAL /HPF
SODIUM BLD-SCNC: 145 MMOL/L (ref 135–144)
SPECIFIC GRAVITY UA: 1 (ref 1–1.03)
TOTAL PROTEIN: 6.7 G/DL (ref 6.4–8.3)
TRICHOMONAS: NORMAL
TSH SERPL DL<=0.05 MIU/L-ACNC: 1.07 MIU/L (ref 0.3–5)
TURBIDITY: CLEAR
URINE HGB: ABNORMAL
UROBILINOGEN, URINE: NORMAL
WBC UA: NORMAL /HPF (ref 0–5)
YEAST: NORMAL

## 2022-02-15 PROCEDURE — 93005 ELECTROCARDIOGRAM TRACING: CPT | Performed by: ANESTHESIOLOGY

## 2022-02-15 PROCEDURE — 81001 URINALYSIS AUTO W/SCOPE: CPT

## 2022-02-15 PROCEDURE — 85025 COMPLETE CBC W/AUTO DIFF WBC: CPT

## 2022-02-15 PROCEDURE — 36415 COLL VENOUS BLD VENIPUNCTURE: CPT

## 2022-02-15 PROCEDURE — 83036 HEMOGLOBIN GLYCOSYLATED A1C: CPT

## 2022-02-15 PROCEDURE — 80053 COMPREHEN METABOLIC PANEL: CPT

## 2022-02-15 PROCEDURE — 84443 ASSAY THYROID STIM HORMONE: CPT

## 2022-02-16 LAB
ABSOLUTE EOS #: 0 K/UL (ref 0–0.4)
ABSOLUTE IMMATURE GRANULOCYTE: 0.39 K/UL (ref 0–0.3)
ABSOLUTE LYMPH #: 32.31 K/UL (ref 1–4.8)
ABSOLUTE MONO #: 1.97 K/UL (ref 0.1–0.8)
BASOPHILS # BLD: 0 % (ref 0–2)
BASOPHILS ABSOLUTE: 0 K/UL (ref 0–0.2)
DIFFERENTIAL TYPE: ABNORMAL
EKG ATRIAL RATE: 83 BPM
EKG P AXIS: 76 DEGREES
EKG P-R INTERVAL: 154 MS
EKG Q-T INTERVAL: 366 MS
EKG QRS DURATION: 88 MS
EKG QTC CALCULATION (BAZETT): 430 MS
EKG R AXIS: -10 DEGREES
EKG T AXIS: 55 DEGREES
EKG VENTRICULAR RATE: 83 BPM
EOSINOPHILS RELATIVE PERCENT: 0 % (ref 1–4)
ESTIMATED AVERAGE GLUCOSE: 123 MG/DL
HBA1C MFR BLD: 5.9 % (ref 4–6)
HCT VFR BLD CALC: 42.9 % (ref 36.3–47.1)
HEMOGLOBIN: 13.3 G/DL (ref 11.9–15.1)
IMMATURE GRANULOCYTES: 1 %
LYMPHOCYTES # BLD: 82 % (ref 24–44)
MCH RBC QN AUTO: 32.5 PG (ref 25.2–33.5)
MCHC RBC AUTO-ENTMCNC: 31 G/DL (ref 28.4–34.8)
MCV RBC AUTO: 104.9 FL (ref 82.6–102.9)
MONOCYTES # BLD: 5 % (ref 1–7)
MORPHOLOGY: ABNORMAL
NRBC AUTOMATED: 0 PER 100 WBC
PDW BLD-RTO: 13.4 % (ref 11.8–14.4)
PLATELET # BLD: 117 K/UL (ref 138–453)
PLATELET ESTIMATE: ABNORMAL
PMV BLD AUTO: 10.1 FL (ref 8.1–13.5)
RBC # BLD: 4.09 M/UL (ref 3.95–5.11)
RBC # BLD: ABNORMAL 10*6/UL
SEG NEUTROPHILS: 12 % (ref 36–66)
SEGMENTED NEUTROPHILS ABSOLUTE COUNT: 4.73 K/UL (ref 1.8–7.7)
WBC # BLD: 39.4 K/UL (ref 3.5–11.3)
WBC # BLD: ABNORMAL 10*3/UL

## 2022-02-28 ENCOUNTER — HOSPITAL ENCOUNTER (OUTPATIENT)
Age: 77
Discharge: HOME | End: 2022-02-28
Payer: MEDICARE

## 2022-02-28 DIAGNOSIS — D69.6 THROMBOCYTOPENIA (HCC): ICD-10-CM

## 2022-02-28 DIAGNOSIS — E87.5 HYPERKALEMIA: ICD-10-CM

## 2022-02-28 DIAGNOSIS — C91.10 CLL (CHRONIC LYMPHOCYTIC LEUKEMIA) (HCC): ICD-10-CM

## 2022-02-28 DIAGNOSIS — E03.9 HYPOTHYROIDISM, UNSPECIFIED TYPE: ICD-10-CM

## 2022-02-28 DIAGNOSIS — R74.02 ELEVATED LDH: ICD-10-CM

## 2022-02-28 DIAGNOSIS — D80.1 HYPOGAMMAGLOBULINEMIA (HCC): ICD-10-CM

## 2022-02-28 LAB
ABSOLUTE EOS #: 0 K/UL (ref 0–0.4)
ABSOLUTE LYMPH #: 28.84 K/UL (ref 1–4.8)
ABSOLUTE MONO #: 0.32 K/UL (ref 0.1–0.8)
ALBUMIN SERPL-MCNC: 4.6 G/DL (ref 3.5–5.2)
ALBUMIN/GLOBULIN RATIO: 2.6 (ref 1–2.5)
ALP BLD-CCNC: 75 U/L (ref 35–104)
ALT SERPL-CCNC: 10 U/L (ref 5–33)
ANION GAP SERPL CALCULATED.3IONS-SCNC: 8 MMOL/L (ref 9–17)
AST SERPL-CCNC: 26 U/L
BASOPHILS # BLD: 0 % (ref 0–2)
BASOPHILS ABSOLUTE: 0 K/UL (ref 0–0.2)
BILIRUB SERPL-MCNC: 0.54 MG/DL (ref 0.3–1.2)
BUN BLDV-MCNC: 12 MG/DL (ref 8–23)
CALCIUM SERPL-MCNC: 9.5 MG/DL (ref 8.6–10.4)
CHLORIDE BLD-SCNC: 102 MMOL/L (ref 98–107)
CO2: 29 MMOL/L (ref 20–31)
CREAT SERPL-MCNC: 0.74 MG/DL (ref 0.5–0.9)
EOSINOPHILS RELATIVE PERCENT: 0 % (ref 1–4)
GFR AFRICAN AMERICAN: >60 ML/MIN
GFR NON-AFRICAN AMERICAN: >60 ML/MIN
GFR SERPL CREATININE-BSD FRML MDRD: ABNORMAL ML/MIN/{1.73_M2}
GLUCOSE BLD-MCNC: 90 MG/DL (ref 70–99)
HCT VFR BLD CALC: 40.9 % (ref 36–46)
HEMOGLOBIN: 13.4 G/DL (ref 12–16)
LACTATE DEHYDROGENASE: 216 U/L (ref 135–214)
LYMPHOCYTES # BLD: 91 % (ref 24–44)
MCH RBC QN AUTO: 32.4 PG (ref 26–34)
MCHC RBC AUTO-ENTMCNC: 32.7 G/DL (ref 31–37)
MCV RBC AUTO: 99 FL (ref 80–100)
MONOCYTES # BLD: 1 % (ref 1–7)
MORPHOLOGY: ABNORMAL
PDW BLD-RTO: 14 % (ref 12.5–15.4)
PLATELET # BLD: 103 K/UL (ref 140–450)
PMV BLD AUTO: 7.2 FL (ref 6–12)
POTASSIUM SERPL-SCNC: 4.9 MMOL/L (ref 3.7–5.3)
RBC # BLD: 4.13 M/UL (ref 4–5.2)
SEG NEUTROPHILS: 8 % (ref 36–66)
SEGMENTED NEUTROPHILS ABSOLUTE COUNT: 2.54 K/UL (ref 1.8–7.7)
SODIUM BLD-SCNC: 139 MMOL/L (ref 135–144)
THYROXINE, FREE: 1.84 NG/DL (ref 0.93–1.7)
TOTAL PROTEIN: 6.4 G/DL (ref 6.4–8.3)
TSH SERPL DL<=0.05 MIU/L-ACNC: 0.97 MIU/L (ref 0.3–5)
WBC # BLD: 31.7 K/UL (ref 3.5–11)

## 2022-02-28 PROCEDURE — 36415 COLL VENOUS BLD VENIPUNCTURE: CPT

## 2022-02-28 PROCEDURE — 80053 COMPREHEN METABOLIC PANEL: CPT

## 2022-02-28 PROCEDURE — 83615 LACTATE (LD) (LDH) ENZYME: CPT

## 2022-02-28 PROCEDURE — 84439 ASSAY OF FREE THYROXINE: CPT

## 2022-02-28 PROCEDURE — 84443 ASSAY THYROID STIM HORMONE: CPT

## 2022-02-28 PROCEDURE — 85025 COMPLETE CBC W/AUTO DIFF WBC: CPT

## 2022-03-09 ENCOUNTER — OFFICE VISIT (OUTPATIENT)
Dept: ONCOLOGY | Age: 77
End: 2022-03-09
Payer: MEDICARE

## 2022-03-09 ENCOUNTER — OFFICE VISIT (OUTPATIENT)
Dept: PRIMARY CARE CLINIC | Age: 77
End: 2022-03-09
Payer: MEDICARE

## 2022-03-09 VITALS
DIASTOLIC BLOOD PRESSURE: 82 MMHG | SYSTOLIC BLOOD PRESSURE: 148 MMHG | HEART RATE: 73 BPM | WEIGHT: 143.4 LBS | TEMPERATURE: 97.6 F | BODY MASS INDEX: 26.23 KG/M2

## 2022-03-09 VITALS
HEART RATE: 70 BPM | DIASTOLIC BLOOD PRESSURE: 80 MMHG | OXYGEN SATURATION: 96 % | SYSTOLIC BLOOD PRESSURE: 122 MMHG | BODY MASS INDEX: 25.79 KG/M2 | WEIGHT: 141 LBS

## 2022-03-09 DIAGNOSIS — N89.8 VAGINAL DISCHARGE: ICD-10-CM

## 2022-03-09 DIAGNOSIS — D80.1 HYPOGAMMAGLOBULINEMIA (HCC): ICD-10-CM

## 2022-03-09 DIAGNOSIS — D69.6 THROMBOCYTOPENIA (HCC): ICD-10-CM

## 2022-03-09 DIAGNOSIS — E03.9 HYPOTHYROIDISM, UNSPECIFIED TYPE: Primary | ICD-10-CM

## 2022-03-09 DIAGNOSIS — C91.10 CLL (CHRONIC LYMPHOCYTIC LEUKEMIA) (HCC): Primary | ICD-10-CM

## 2022-03-09 PROCEDURE — 99211 OFF/OP EST MAY X REQ PHY/QHP: CPT | Performed by: INTERNAL MEDICINE

## 2022-03-09 PROCEDURE — G8400 PT W/DXA NO RESULTS DOC: HCPCS | Performed by: INTERNAL MEDICINE

## 2022-03-09 PROCEDURE — 1036F TOBACCO NON-USER: CPT | Performed by: INTERNAL MEDICINE

## 2022-03-09 PROCEDURE — G8427 DOCREV CUR MEDS BY ELIG CLIN: HCPCS | Performed by: NURSE PRACTITIONER

## 2022-03-09 PROCEDURE — 99214 OFFICE O/P EST MOD 30 MIN: CPT | Performed by: INTERNAL MEDICINE

## 2022-03-09 PROCEDURE — 1090F PRES/ABSN URINE INCON ASSESS: CPT | Performed by: NURSE PRACTITIONER

## 2022-03-09 PROCEDURE — G8417 CALC BMI ABV UP PARAM F/U: HCPCS | Performed by: INTERNAL MEDICINE

## 2022-03-09 PROCEDURE — 1123F ACP DISCUSS/DSCN MKR DOCD: CPT | Performed by: NURSE PRACTITIONER

## 2022-03-09 PROCEDURE — G8400 PT W/DXA NO RESULTS DOC: HCPCS | Performed by: NURSE PRACTITIONER

## 2022-03-09 PROCEDURE — G8417 CALC BMI ABV UP PARAM F/U: HCPCS | Performed by: NURSE PRACTITIONER

## 2022-03-09 PROCEDURE — G8484 FLU IMMUNIZE NO ADMIN: HCPCS | Performed by: NURSE PRACTITIONER

## 2022-03-09 PROCEDURE — 1036F TOBACCO NON-USER: CPT | Performed by: NURSE PRACTITIONER

## 2022-03-09 PROCEDURE — G8427 DOCREV CUR MEDS BY ELIG CLIN: HCPCS | Performed by: INTERNAL MEDICINE

## 2022-03-09 PROCEDURE — 4040F PNEUMOC VAC/ADMIN/RCVD: CPT | Performed by: NURSE PRACTITIONER

## 2022-03-09 PROCEDURE — 1123F ACP DISCUSS/DSCN MKR DOCD: CPT | Performed by: INTERNAL MEDICINE

## 2022-03-09 PROCEDURE — 99213 OFFICE O/P EST LOW 20 MIN: CPT | Performed by: NURSE PRACTITIONER

## 2022-03-09 PROCEDURE — 4040F PNEUMOC VAC/ADMIN/RCVD: CPT | Performed by: INTERNAL MEDICINE

## 2022-03-09 PROCEDURE — G8484 FLU IMMUNIZE NO ADMIN: HCPCS | Performed by: INTERNAL MEDICINE

## 2022-03-09 PROCEDURE — 1090F PRES/ABSN URINE INCON ASSESS: CPT | Performed by: INTERNAL MEDICINE

## 2022-03-09 NOTE — PROGRESS NOTES
ONCOLOGY NOTE    PCP: KIRA Delatorre CNP  Referring Provider: No ref. provider found     DIAGNOSIS:   CLL    CURRENT TREATMENT:   Active surveillance    BRIEF CASE HISTORY:   Yara Montenegro is a very pleasant 66 y.o. female who is presenting for follow-up for CLL. She was referred to me in 02/2020 for management of CLL/SLL which was diagnosed around September 2004. She had been living in [place] for quite some time. She moved to [facility] in early 2019. She had MRSA infection in 2016. INTERIM HISTORY:  Patient presents to the clinic to discuss results of her lab work-up and other relevant clinical data with CLL. She reports episode of vaginitis, CT and intravaginal US were done and testing was able to be done into the uterus, misshapen uterus seen. D&C with tissue sample was recommended and she is unsure about proceeding with her CLL induced leukocytosis, she has also scheduled appointment at [facility] for consultation. Her discharge persists with occasional spotting and pain. During this visit patient's allergy, social, medical, surgical history and medications were reviewed and updated. PAST MEDICAL HISTORY:      Diagnosis Date    CLL (chronic lymphocytic leukemia) ([facility] [address] 2004    Hyperlipidemia     Hypertension     Hypothyroidism     MRSA (methicillin resistant Staphylococcus aureus) infection        PAST SURGICAL HISTORY:      Procedure Laterality Date    BREAST BIOPSY Right     COLONOSCOPY      TUBAL LIGATION         CURRENT MEDICATIONS:   Current Outpatient Medications   Medication Sig Dispense Refill    rosuvastatin (CRESTOR) 5 MG tablet Take 1 tablet by mouth daily 90 tablet 2    ramipril (ALTACE) 5 MG capsule Take 1 capsule by mouth daily 90 capsule 3    levothyroxine (SYNTHROID) 88 MCG tablet Take 1 tablet by mouth Daily 90 tablet 3     No current facility-administered medications for this visit.          Vitals:    03/09/22 1602 BP: (!) 148/82   Pulse: 73   Temp: 97.6 °F (36.4 °C)     General: No fever or night sweats, Weight is stable. ENT: No double or blurred vision, no hearing problem, no dysphagia or sore throat   Respiratory: No chest pain, no shortness of breath, no cough or hemoptysis. Cardiovascular: Denies chest pain, PND or orthopnea. No L E swelling or palpitations. Gastrointestinal: No nausea or vomiting, abdominal pain, diarrhea or constipation. Genitourinary: Denies dysuria, hematuria, frequency, urgency or incontinence. Neurological: Denies headaches, decreased LOC, no sensory or motor focal deficits. Musculoskeletal: No arthralgia no back pain or joint swelling. Skin: There are no rashes or bleeding. Psych: Denies hallucinations or intentions to harm self      PHYSICAL EXAM:         Physical Exam  Vitals and nursing note reviewed. Constitutional:       General: She is not in acute distress. Appearance: She is well-developed. HENT:      Head: Normocephalic and atraumatic. Eyes:      Pupils: Pupils are equal, round, and reactive to light. Cardiovascular:      Rate and Rhythm: Normal rate and regular rhythm. Heart sounds: Normal heart sounds. No murmur heard. Pulmonary:      Effort: Pulmonary effort is normal. No respiratory distress. Breath sounds: Normal breath sounds. No stridor. No wheezing. Abdominal:      General: Bowel sounds are normal. There is no distension. Palpations: Abdomen is soft. Tenderness: There is no abdominal tenderness. Musculoskeletal:         General: Normal range of motion. Cervical back: Neck supple. Skin:     General: Skin is warm and dry. Findings: No rash. Neurological:      Mental Status: She is alert and oriented to person, place, and time. Cranial Nerves: No cranial nerve deficit.    Psychiatric:         Behavior: Behavior normal.         LABS:   Lab Results   Component Value Date    WBC 31.7 (HH) 02/28/2022    HGB 13.4 02/28/2022    HCT 40.9 02/28/2022    MCV 99.0 02/28/2022     (L) 02/28/2022       Chemistry        Component Value Date/Time     02/28/2022 0833    K 4.9 02/28/2022 0833     02/28/2022 0833    CO2 29 02/28/2022 0833    BUN 12 02/28/2022 0833    CREATININE 0.74 02/28/2022 0833        Component Value Date/Time    CALCIUM 9.5 02/28/2022 0833    ALKPHOS 75 02/28/2022 0833    AST 26 02/28/2022 0833    ALT 10 02/28/2022 0833    BILITOT 0.54 02/28/2022 0833            Results for orders placed or performed during the hospital encounter of 02/28/22   T4, Free   Result Value Ref Range    Thyroxine, Free 1.84 (H) 0.93 - 1.70 ng/dL   TSH   Result Value Ref Range    TSH 0.97 0.30 - 5.00 mIU/L     IMPRESSION:     1. CLL (chronic lymphocytic leukemia) ([facility] [address]        Patient Active Problem List   Diagnosis    Mixed hyperlipidemia    Hypothyroidism    Essential hypertension    CLL (chronic lymphocytic leukemia) ([facility] [address]    History of MRSA infection    Thrombocytopenia ([facility] [address]    Hypogammaglobulinemia ([facility] [address]       PLAN:   Her lab work shows continued leukocytosis, PLT has been decreasing and we will continue to monitor and if continues will plan for further testing and initiate treatment if indicated. We discussed in detail her vaginitis, work up to date and recommendation for [facility]  with tissue sample I am in agreement with recommendation and explained benefit of test outweighs potential risk for infection and will defer to gynecology for further management. I explained very low suspicion of relation to CLL but biopsy would show if that was case, I discussed differential favors inflammation or atrophic vaginitis and estrogen cream can be considered. Return to clinic in 3 months or sooner if clinically indicated. Scribe Attestation   This note was created by Krystina Jacob acting as scribe for the physician signing this note  Electronically Signed  Krystina Jacob, 3/9/2022  [employer].      Attending Attestation   Note was reviewed and edited. I am in agreement with the note as entered    Zheng Romero MD      This note is created with the assistance of a speech recognition program.  While intending to generate a document that actually reflects the content of the visit, the document can still have some errors including those of syntax and sound a like substitutions which may escape proof reading. It such instances, actual meaning can be extrapolated by contextual diversion.

## 2022-03-09 NOTE — PROGRESS NOTES
[address] [facility]  [address]  [address] [address] [place] [address]  Dept: [address] Davison is a 64 y.o. female Established patient, who presents today for her medical conditions/complaints as noted below. Chief Complaint   Patient presents with    Thyroid Problem     hypothyroid follow up    Other     pt cancelled procedure due to high WBC count - pt is going to go to [address] [place] [address] for a second opinion. HPI:     HPI  She did not have the procedure due to high WBC  She has CLL and has appointment with [facility]  She did have a conversation with Dr. Priyanka Esquivel and delayed procedure until she talks to Dr. Fior Simpson later today. She has an appointment with [facility] for further evaluation at [facility] clinic. She has an appointment with [facility] tomorrow. . She will probably see a post menopausal specialist.  TSH is normal and Free T4 is slightly elevated. She states she is feeling well other than some vaginal discharge that may be related to post menopause. She is exercising and walking.      Reviewed prior notes OB/GYN  Reviewed previous Labs and Imaging    LDL Cholesterol (mg/dL)   Date Value   11/04/2021 146 (H)   10/13/2020 103   10/02/2019 105       (goal LDL is <100)   AST (U/L)   Date Value   02/28/2022 26     ALT (U/L)   Date Value   02/28/2022 10     BUN (mg/dL)   Date Value   02/28/2022 12     Hemoglobin A1C (%)   Date Value   02/15/2022 5.9     TSH (mIU/L)   Date Value   02/28/2022 0.97     BP Readings from Last 3 Encounters:   03/09/22 122/80   02/15/22 (!) 160/84   02/09/22 136/84          (goal 120/80)    Past Medical History:   Diagnosis Date    CLL (chronic lymphocytic leukemia) ([facility] [address] 2004    Hyperlipidemia     Hypertension     Hypothyroidism     MRSA (methicillin resistant Staphylococcus aureus) infection       Past Surgical History:   Procedure Laterality Date    BREAST BIOPSY Right     COLONOSCOPY abdominal pain, diarrhea, nausea and vomiting. Genitourinary: Positive for vaginal discharge. Negative for dysuria, frequency, hematuria, vaginal bleeding and vaginal pain. Musculoskeletal: Negative for arthralgias and myalgias. Neurological: Negative for dizziness, light-headedness and headaches. Psychiatric/Behavioral: Negative for dysphoric mood and sleep disturbance. The patient is not nervous/anxious. Objective:     /80   Pulse 70   Wt 141 lb (64 kg)   SpO2 96%   BMI 25.79 kg/m²   Physical Exam  Vitals and nursing note reviewed. Constitutional:       General: She is not in acute distress. Appearance: She is well-developed. She is not ill-appearing. HENT:      Head: Normocephalic and atraumatic. Right Ear: External ear normal.      Left Ear: External ear normal.   Eyes:      General: No scleral icterus. Right eye: No discharge. Left eye: No discharge. Conjunctiva/sclera: Conjunctivae normal.      Pupils: Pupils are equal, round, and reactive to light. Neck:      Thyroid: No thyromegaly. Trachea: No tracheal deviation. Cardiovascular:      Rate and Rhythm: Normal rate and regular rhythm. Heart sounds: Normal heart sounds. Comments: No carotid bruit  Pulmonary:      Effort: Pulmonary effort is normal. No respiratory distress. Breath sounds: Normal breath sounds. No wheezing. Abdominal:      General: Bowel sounds are normal.      Palpations: Abdomen is soft. Tenderness: There is no abdominal tenderness. Lymphadenopathy:      Cervical: No cervical adenopathy. Skin:     General: Skin is warm. Findings: No rash. Neurological:      Mental Status: She is alert and oriented to person, place, and time. Psychiatric:         Mood and Affect: Mood normal.         Behavior: Behavior normal.         Thought Content: Thought content normal.         Assessment/Plan:   1.  Hypothyroidism, unspecified type       Return in about 4 months (around 7/18/2022) for AWV. No orders of the defined types were placed in this encounter. No orders of the defined types were placed in this encounter. Patient given educational materials - see patient instructions. Discussed use, benefit, and side effects of prescribed medications. All patient questions answered. Pt voiced understanding. Reviewed health maintenance. Instructed to continue current medications, diet and exercise. Patient agreed with treatment plan. Follow up as directed.      Electronically signed by KIRA Acevedo CNP on 3/9/2022 at 10:35 AM

## 2022-03-10 ASSESSMENT — ENCOUNTER SYMPTOMS
COUGH: 0
ABDOMINAL PAIN: 0
EYE REDNESS: 0
DIARRHEA: 0
EYE DISCHARGE: 0
NAUSEA: 0
RHINORRHEA: 0
WHEEZING: 0
SHORTNESS OF BREATH: 0
SORE THROAT: 0
VOMITING: 0

## 2022-03-11 ENCOUNTER — TELEPHONE (OUTPATIENT)
Dept: ONCOLOGY | Age: 77
End: 2022-03-11

## 2022-03-21 ENCOUNTER — NURSE ONLY (OUTPATIENT)
Dept: PRIMARY CARE CLINIC | Age: 77
End: 2022-03-21

## 2022-03-21 ENCOUNTER — TELEPHONE (OUTPATIENT)
Dept: PRIMARY CARE CLINIC | Age: 77
End: 2022-03-21

## 2022-03-21 VITALS — HEART RATE: 82 BPM | OXYGEN SATURATION: 97 % | DIASTOLIC BLOOD PRESSURE: 86 MMHG | SYSTOLIC BLOOD PRESSURE: 150 MMHG

## 2022-03-21 DIAGNOSIS — I10 ESSENTIAL HYPERTENSION: Primary | ICD-10-CM

## 2022-03-21 NOTE — PROGRESS NOTES
Patient here today with concerns about her BP. Patient monitors BP regularly at home and is concerned with a spike she noticed this morning. Patient reports morning home reading as 180/93. Initial check of patients BP is 174/82. I let patient sit for 10 minutes and did a BP recheck - patient was 150/86. Patient denies SOB & chest pain - states she is concerned because BP spikes are very dangerous and she does not want this to continue happening. Patient is concerned that ramipril is not strong enough or that she may need to try a different medication due to the length of time she has been on medication. Patient scheduled for an appointment tomorrow - OK per laura. Patient advised to go to ED with any SOB or chest pain & verbalized understanding.

## 2022-03-21 NOTE — TELEPHONE ENCOUNTER
Pt states that her BP is 180/93 I advised her that her BP wasn't in an concerning range. She states that her BP wont come down and its continuing to go up and she feels off and knows that something is not right. She said every time she checks it its getting higher and that this is very unusual for her. She states she wants to be seen today and that if she can't she is going to go to the ER. I offered pt an appt and she stated 1 oclock was too late for her and asked if she could come down and get her BP checked by Carolyn. I spoke with Carolyn and she said she would check it, so I advised her to walk down to the office so that they could check it for her.

## 2022-03-22 ENCOUNTER — OFFICE VISIT (OUTPATIENT)
Dept: PRIMARY CARE CLINIC | Age: 77
End: 2022-03-22
Payer: MEDICARE

## 2022-03-22 VITALS
OXYGEN SATURATION: 98 % | BODY MASS INDEX: 26.12 KG/M2 | HEART RATE: 81 BPM | WEIGHT: 142.8 LBS | DIASTOLIC BLOOD PRESSURE: 80 MMHG | SYSTOLIC BLOOD PRESSURE: 142 MMHG

## 2022-03-22 DIAGNOSIS — I10 ESSENTIAL HYPERTENSION: Primary | ICD-10-CM

## 2022-03-22 PROCEDURE — G8427 DOCREV CUR MEDS BY ELIG CLIN: HCPCS | Performed by: NURSE PRACTITIONER

## 2022-03-22 PROCEDURE — 1123F ACP DISCUSS/DSCN MKR DOCD: CPT | Performed by: NURSE PRACTITIONER

## 2022-03-22 PROCEDURE — G8484 FLU IMMUNIZE NO ADMIN: HCPCS | Performed by: NURSE PRACTITIONER

## 2022-03-22 PROCEDURE — 99213 OFFICE O/P EST LOW 20 MIN: CPT | Performed by: NURSE PRACTITIONER

## 2022-03-22 PROCEDURE — G8417 CALC BMI ABV UP PARAM F/U: HCPCS | Performed by: NURSE PRACTITIONER

## 2022-03-22 PROCEDURE — G8400 PT W/DXA NO RESULTS DOC: HCPCS | Performed by: NURSE PRACTITIONER

## 2022-03-22 PROCEDURE — 4040F PNEUMOC VAC/ADMIN/RCVD: CPT | Performed by: NURSE PRACTITIONER

## 2022-03-22 PROCEDURE — 1036F TOBACCO NON-USER: CPT | Performed by: NURSE PRACTITIONER

## 2022-03-22 PROCEDURE — 1090F PRES/ABSN URINE INCON ASSESS: CPT | Performed by: NURSE PRACTITIONER

## 2022-03-22 RX ORDER — METOPROLOL SUCCINATE 25 MG/1
12.5 TABLET, EXTENDED RELEASE ORAL DAILY
Qty: 15 TABLET | Refills: 2 | Status: SHIPPED | OUTPATIENT
Start: 2022-03-22 | End: 2022-07-11 | Stop reason: SDUPTHER

## 2022-03-22 ASSESSMENT — ENCOUNTER SYMPTOMS
RHINORRHEA: 0
WHEEZING: 0
DIARRHEA: 0
ABDOMINAL PAIN: 0
SORE THROAT: 0
SHORTNESS OF BREATH: 0
COUGH: 0
EYE DISCHARGE: 0
EYE REDNESS: 0
VOMITING: 0
NAUSEA: 0

## 2022-03-22 NOTE — PATIENT INSTRUCTIONS
Metoprolol succinate 12.5 mg daily   Monitor blood pressure. Bring cuff to next appointment and we will check accuracy.

## 2022-03-22 NOTE — PROGRESS NOTES
[address] [address] [facility]  [address]  [facility] [address] [place] [address]  Dept: [address] Davison is a 64 y.o. female Established patient, who presents today for her medical conditions/complaints as noted below. Chief Complaint   Patient presents with    Hypertension     pt wants to discuss possible change in medication. HPI:     HPI  Blood pressure tends to fluctuate somewhat. When she is at a new providers and discussing different problems he blood pressure tends to increase. At home and calm settings he blood pressure tends to be OK. She has been on ramipril for a long time. She should not take ACE inhibitor before general surgeon. Yesterday morning her blood pressure was 165/84 and her pulse 70  The thing that bothered her was she had not started her day. She was not busy before she took her blood pressure.     Reviewed prior notes oncology  Reviewed previous Labs    LDL Cholesterol (mg/dL)   Date Value   11/04/2021 146 (H)   10/13/2020 103   10/02/2019 105       (goal LDL is <100)   AST (U/L)   Date Value   02/28/2022 26     ALT (U/L)   Date Value   02/28/2022 10     BUN (mg/dL)   Date Value   02/28/2022 12     Hemoglobin A1C (%)   Date Value   02/15/2022 5.9     TSH (mIU/L)   Date Value   02/28/2022 0.97     BP Readings from Last 3 Encounters:   03/22/22 (!) 142/80   03/21/22 (!) 150/86   03/09/22 (!) 148/82          (goal 120/80)    Past Medical History:   Diagnosis Date    CLL (chronic lymphocytic leukemia) ([facility] [address] 2004    Hyperlipidemia     Hypertension     Hypothyroidism     MRSA (methicillin resistant Staphylococcus aureus) infection       Past Surgical History:   Procedure Laterality Date    BREAST BIOPSY Right     COLONOSCOPY      TUBAL LIGATION         Family History   Problem Relation Age of Onset    Dementia Mother     Other Father         CLL       Social History     Tobacco Use    Smoking status: Never Smoker    Smokeless tobacco: Never Used   Substance Use Topics    Alcohol use: Yes     Comment: social      Current Outpatient Medications   Medication Sig Dispense Refill    metoprolol succinate (TOPROL XL) 25 MG extended release tablet Take 0.5 tablets by mouth daily 15 tablet 2    rosuvastatin (CRESTOR) 5 MG tablet Take 1 tablet by mouth daily 90 tablet 2    ramipril (ALTACE) 5 MG capsule Take 1 capsule by mouth daily 90 capsule 3    levothyroxine (SYNTHROID) 88 MCG tablet Take 1 tablet by mouth Daily 90 tablet 3     No current facility-administered medications for this visit. Allergies   Allergen Reactions    Penicillins Hives, Itching and Swelling    Epinephrine Other (See Comments)     Can tolerate lower doses, but if higher dose: will cause high heart rate. Health Maintenance   Topic Date Due    Hepatitis C screen  Never done    DTaP/Tdap/Td vaccine (1 - Tdap) Never done    Shingles Vaccine (1 of 2) Never done    Pneumococcal 65+ yrs at Risk Vaccine (2 of 2 - PCV13) 10/27/2010    Flu vaccine (1) 09/01/2021    COVID-19 Vaccine (4 - Booster for Pfizer series) 03/08/2022    Depression Screen  07/13/2022    Annual Wellness Visit (AWV)  07/14/2022    Lipid screen  11/04/2022    TSH testing  02/28/2023    Potassium monitoring  02/28/2023    Creatinine monitoring  02/28/2023    DEXA (modify frequency per FRAX score)  Completed    Hepatitis A vaccine  Aged Out    Hepatitis B vaccine  Aged Out    Hib vaccine  Aged Out    Meningococcal (ACWY) vaccine  Aged Out       Subjective:      Review of Systems   Constitutional: Negative for chills and fever. HENT: Negative for rhinorrhea and sore throat. Eyes: Negative for discharge and redness. Respiratory: Negative for cough, shortness of breath and wheezing. Cardiovascular: Negative for chest pain and palpitations. Gastrointestinal: Negative for abdominal pain, diarrhea, nausea and vomiting. Genitourinary: Negative for dysuria and frequency. Musculoskeletal: Negative for arthralgias and myalgias. Neurological: Negative for dizziness, light-headedness and headaches. Psychiatric/Behavioral: Negative for sleep disturbance. The patient is nervous/anxious (due to husbands healthy issues and her own possible surgery). Objective:     BP (!) 142/80   Pulse 81   Wt 142 lb 12.8 oz (64.8 kg)   SpO2 98%   BMI 26.12 kg/m²   Physical Exam  Vitals and nursing note reviewed. Constitutional:       Appearance: Normal appearance. HENT:      Head: Normocephalic and atraumatic. Right Ear: External ear normal.      Left Ear: External ear normal.   Cardiovascular:      Rate and Rhythm: Normal rate and regular rhythm. Heart sounds: Normal heart sounds. Comments: No carotid bruit  Pulmonary:      Effort: Pulmonary effort is normal.      Breath sounds: Normal breath sounds. Abdominal:      General: Bowel sounds are normal.      Palpations: Abdomen is soft. Musculoskeletal:      Right lower leg: No edema. Left lower leg: No edema. Skin:     General: Skin is warm. Neurological:      Mental Status: She is alert and oriented to person, place, and time. Motor: No weakness. Psychiatric:         Mood and Affect: Mood normal.         Behavior: Behavior normal.         Assessment/Plan:   1. Essential hypertension  -     metoprolol succinate (TOPROL XL) 25 MG extended release tablet; Take 0.5 tablets by mouth daily, Disp-15 tablet, R-2Normal     Metoprolol succinate 12.5 mg daily   Monitor blood pressure. Bring cuff to next appointment and we will check accuracy. Return in about 4 weeks (around 4/19/2022) for HTN. No orders of the defined types were placed in this encounter.     Orders Placed This Encounter   Medications    metoprolol succinate (TOPROL XL) 25 MG extended release tablet     Sig: Take 0.5 tablets by mouth daily     Dispense:  15 tablet     Refill:  2       Patient given educational materials - see patient instructions. Discussed use, benefit, and side effects of prescribed medications. All patient questions answered. Pt voiced understanding. Reviewed health maintenance. Instructed to continue current medications, diet and exercise. Patient agreed with treatment plan. Follow up as directed.      Electronically signed by KIRA Hussein CNP on 3/22/2022 at 11:46 AM

## 2022-03-23 ENCOUNTER — TELEMEDICINE (OUTPATIENT)
Dept: OBGYN CLINIC | Age: 77
End: 2022-03-23
Payer: MEDICARE

## 2022-03-23 DIAGNOSIS — R93.89 ABNORMAL CT SCAN: Primary | ICD-10-CM

## 2022-03-23 DIAGNOSIS — R93.89 THICKENED ENDOMETRIUM: ICD-10-CM

## 2022-03-23 DIAGNOSIS — N89.8 VAGINAL IRRITATION: ICD-10-CM

## 2022-03-23 DIAGNOSIS — R10.2 PELVIC PRESSURE IN FEMALE: ICD-10-CM

## 2022-03-23 DIAGNOSIS — N89.8 VAGINAL DISCHARGE: ICD-10-CM

## 2022-03-23 PROCEDURE — 1123F ACP DISCUSS/DSCN MKR DOCD: CPT | Performed by: OBSTETRICS & GYNECOLOGY

## 2022-03-23 PROCEDURE — 4040F PNEUMOC VAC/ADMIN/RCVD: CPT | Performed by: OBSTETRICS & GYNECOLOGY

## 2022-03-23 PROCEDURE — 99214 OFFICE O/P EST MOD 30 MIN: CPT | Performed by: OBSTETRICS & GYNECOLOGY

## 2022-03-23 PROCEDURE — 1090F PRES/ABSN URINE INCON ASSESS: CPT | Performed by: OBSTETRICS & GYNECOLOGY

## 2022-03-23 PROCEDURE — G8428 CUR MEDS NOT DOCUMENT: HCPCS | Performed by: OBSTETRICS & GYNECOLOGY

## 2022-03-23 PROCEDURE — G8400 PT W/DXA NO RESULTS DOC: HCPCS | Performed by: OBSTETRICS & GYNECOLOGY

## 2022-03-23 NOTE — PROGRESS NOTES
Oracio Mancilla  3/23/2022    Oracio Mancilla (:  1945) has requested an audio/video evaluation for the following concern(s):    1. Abnormal CT scan    2. Pelvic pressure in female    3. Vaginal discharge    4. Thickened endometrium    5. Vaginal irritation          TELEHEALTH EVALUATION -- Audio/Visual (During TDGYX-51 public health emergency)      Oracio Mancilla is a 64 y.o. female       She was here to follow-up regarding her labs and diagnostics ordered at her last visit for the diagnosis of:    ICD-10-CM    1. Abnormal CT scan  R93.89    2. Pelvic pressure in female  R10.2    3. Vaginal discharge  N89.8    4. Thickened endometrium  R93.89    5. Vaginal irritation  N89.8        Refer to prior consult notes. Abnormal exam and ultrasound/CT. Tissue biopsy highly recommended. Pt. Rosalba Golden second opinion at [address] [place] [address] that agreed with plan. She would like to proceed with surgery. She understands she is moderate risk due to CLL and other co-morbidities. Discussed that will attempt Hscope, D&C, Myosure, but if unable to access without perforation will abandon procedure and send to [address] [place] [address].       Past Medical History:   Diagnosis Date    CLL (chronic lymphocytic leukemia) ([facility] [address]     Hyperlipidemia     Hypertension     Hypothyroidism     MRSA (methicillin resistant Staphylococcus aureus) infection          Past Surgical History:   Procedure Laterality Date    BREAST BIOPSY Right     COLONOSCOPY      TUBAL LIGATION           Family History   Problem Relation Age of Onset    Dementia Mother     Other Father         CLL         Social History     Tobacco Use    Smoking status: Never Smoker    Smokeless tobacco: Never Used   Vaping Use    Vaping Use: Never used   Substance Use Topics    Alcohol use: Yes     Comment: social    Drug use: Never         MEDICATIONS:  Current Outpatient Medications   Medication Sig Dispense Refill    metoprolol succinate (TOPROL XL) 25 MG extended release tablet Take 0.5 tablets by mouth daily 15 tablet 2    rosuvastatin (CRESTOR) 5 MG tablet Take 1 tablet by mouth daily 90 tablet 2    ramipril (ALTACE) 5 MG capsule Take 1 capsule by mouth daily 90 capsule 3    levothyroxine (SYNTHROID) 88 MCG tablet Take 1 tablet by mouth Daily 90 tablet 3     No current facility-administered medications for this visit. ALLERGIES:  Allergies as of 03/23/2022 - Fully Reviewed 03/23/2022   Allergen Reaction Noted    Penicillins Hives, Itching, and Swelling 04/15/2019    Epinephrine Other (See Comments) 04/15/2019         not currently breastfeeding. PE is limited due to the virtual visit    Abdomen: Soft non-tender; good bowel sounds. No guarding, rebound or rigidity. No CVA tenderness bilaterally reported when questioned.  (Viewed Virtually)    Extremities: No calf tenderness, DTR 2/4, and No edema bilaterally as inspected by video and palpation by the patient (Viewed Virtually)    Pelvic: (Virtual Visit-Not Completed)    Diagnostics:  EXAMINATION:   PELVIC ULTRASOUND       1/28/2022       TECHNIQUE:   Transabdominal and transvaginal pelvic ultrasound was performed.       COMPARISON:   01/11/2022       HISTORY:   ORDERING SYSTEM PROVIDED HISTORY: Vaginal bleeding   TECHNOLOGIST PROVIDED HISTORY:   This procedure can be scheduled via [employer].  Access your [employer] account by   visiting [email or web address].       FINDINGS:   The exam is limited due to vaginal atrophy difficulty visualized in the   uterus.           Measurements:       Uterus: 5 cm length by 2.4 cm AP       Endometrial stripe: Not measured.       Right Ovary:Not measured.       Left Ovary: Not measured.           Ultrasound Findings:       Uterus: Uterus has a slightly heterogeneous echotexture as visualized       Endometrial stripe: Endometrium could not be adequately visualized for   measurement.       Right Ovary: Right ovary was not visualized.  No definite adnexal masses on   the images obtained.       Left Ovary:  Left ovary could not be visualized.  No definite adnexal masses   are seen on the images obtained.       Free Fluid: No evidence of free fluid.           Impression   Very limited exam.       Limited visualization of the uterus.  The endometrium could not be adequately   visualized/assessed.       Neither ovary was seen. EXAMINATION:   CT OF THE ABDOMEN AND PELVIS WITH CONTRAST 1/14/2022 2:47 pm       TECHNIQUE:   CT of the abdomen and pelvis was performed with the administration of   intravenous contrast. Multiplanar reformatted images are provided for review. Dose modulation, iterative reconstruction, and/or weight based adjustment of   the mA/kV was utilized to reduce the radiation dose to as low as reasonably   achievable.       COMPARISON:   None.       HISTORY:   ORDERING SYSTEM PROVIDED HISTORY: Vaginal irritation   TECHNOLOGIST PROVIDED HISTORY:       pelvic/vaginal pressure, difficult insert speculum with exam- mass/prolapse? inability visualize uterus on US   Reason for Exam: pelvic/vaginal pressure, difficult insert speculum with   exam- mass/prolapse? inability visualize uterus on US   Additional signs and symptoms: vag irritation       FINDINGS:   Lower Chest: Mild basilar atelectasis is noted.  Small hiatal hernia.       Organs: Spleen is top-normal in size without focal mass.  Liver, pancreas,   gallbladder, adrenals, and kidneys demonstrate no acute abnormality although   a 14 mm cyst is present in the right kidney.       GI/Bowel: No free fluid, free air or bowel obstruction is noted.  Small   periumbilical fat containing hernia without strangulation is present.  A few   colonic diverticula are present without acute diverticulitis.       Pelvis: Appendix is visualized.  There is no appendicitis.  Bladder   demonstrates no gross abnormality.  Uterus is abnormal in appearance.    Endometrial cavity is thickened to 12 x 16 mm suggestive of endometrial   pathology given age. Red Henry free pelvic fluid, pelvic or inguinal adenopathy is   noted. Cyn Simon is air in the vagina.  Fistulous formation cannot be excluded   on this noncontrast study.       Peritoneum/Retroperitoneum: No aortic aneurysm, retroperitoneal or mesenteric   adenopathy is noted.       Bones/Soft Tissues: No acute osseous or soft tissue abnormality.  Facet   arthropathy lower lumbar spine is noted.           Impression   1.  Abnormal endometrial cavity thickness suspicious for endometrial   pathology.       2.  There is air in the vaginal canal which is irregular.  Fistulous   formation not excluded.  Vaginal mass is difficult to exclude due to   heterogeneity.       RECOMMENDATIONS:   Correlation with outside pelvic ultrasound advised, images and report not   available to us. [facility] imaging may prove helpful for further assessment. Lab Results:  Results for orders placed or performed during the hospital encounter of 02/28/22   T4, Free   Result Value Ref Range    Thyroxine, Free 1.84 (H) 0.93 - 1.70 ng/dL   TSH   Result Value Ref Range    TSH 0.97 0.30 - 5.00 mIU/L           Assessment:   Diagnosis Orders   1. Abnormal CT scan     2. Pelvic pressure in female     3. Vaginal discharge     4. Thickened endometrium     5. Vaginal irritation       No chief complaint on file. Patient Active Problem List    Diagnosis Date Noted    Hypogammaglobulinemia ([address] 01/12/2021    Thrombocytopenia ([address] 01/06/2020    Mixed hyperlipidemia 04/15/2019    Hypothyroidism 04/15/2019    Essential hypertension 04/15/2019    CLL (chronic lymphocytic leukemia) ([facility] [address] 04/15/2019    History of MRSA infection 04/15/2019       PLAN:  Return in about 4 weeks (around 4/20/2022) for post op. EUA, Hscope, D&C, Myosure, Cervical biopsy, ECC  Gyn Onc referral offered and patient declined at this time unless deemed necessary after procedure. Counseled on preventative health maintenance follow-up.   No orders of the defined types were placed in this encounter. Estrellita Siemens is a 64 y.o. female female was evaluated by a Virtual Visit (video visit) encounter to address concerns as mentioned above. A caregiver was present when appropriate. Due to this being a TeleHealth encounter (During TOKTA-56 public health emergency), evaluation of the following organ systems was limited: Vitals/Constitutional/EENT/Resp/CV/GI//MS/Neuro/Skin/Heme-Lymph-Imm. Pursuant to the emergency declaration under the [address] [place] [address], [address] [place] [address] authority and the [employer] Act, this Virtual Visit was conducted with patient's (and/or legal guardian's) consent, to reduce the patient's risk of exposure to COVID-19 and provide necessary medical care. The patient (and/or legal guardian) has also been advised to contact this office for worsening conditions or problems, and seek emergency medical treatment and/or call 911 if deemed necessary. Services were provided through a video synchronous discussion virtually to substitute for in-person clinic visit. Patient and provider were located at their individual homes. Electronically signed by Shashi Villafana DO on 3/23/22 at 7:08 PM EDT     An electronic signature was used to authenticate this note. The Virtual Visit time of 20 minutes. More than 50% of this visit was counseling and education regarding The primary encounter diagnosis was Abnormal CT scan. Diagnoses of Pelvic pressure in female, Vaginal discharge, Thickened endometrium, and Vaginal irritation were also pertinent to this visit. and No chief complaint on file. as well as  counseling on preventative health maintenance follow-up.

## 2022-04-19 ENCOUNTER — OFFICE VISIT (OUTPATIENT)
Dept: PRIMARY CARE CLINIC | Age: 77
End: 2022-04-19
Payer: MEDICARE

## 2022-04-19 VITALS
HEART RATE: 74 BPM | SYSTOLIC BLOOD PRESSURE: 137 MMHG | OXYGEN SATURATION: 98 % | WEIGHT: 142.2 LBS | DIASTOLIC BLOOD PRESSURE: 75 MMHG | BODY MASS INDEX: 26.01 KG/M2

## 2022-04-19 DIAGNOSIS — I10 ESSENTIAL HYPERTENSION: Primary | ICD-10-CM

## 2022-04-19 PROCEDURE — G8417 CALC BMI ABV UP PARAM F/U: HCPCS | Performed by: NURSE PRACTITIONER

## 2022-04-19 PROCEDURE — 99213 OFFICE O/P EST LOW 20 MIN: CPT | Performed by: NURSE PRACTITIONER

## 2022-04-19 PROCEDURE — G8400 PT W/DXA NO RESULTS DOC: HCPCS | Performed by: NURSE PRACTITIONER

## 2022-04-19 PROCEDURE — 1123F ACP DISCUSS/DSCN MKR DOCD: CPT | Performed by: NURSE PRACTITIONER

## 2022-04-19 PROCEDURE — G8427 DOCREV CUR MEDS BY ELIG CLIN: HCPCS | Performed by: NURSE PRACTITIONER

## 2022-04-19 PROCEDURE — 1090F PRES/ABSN URINE INCON ASSESS: CPT | Performed by: NURSE PRACTITIONER

## 2022-04-19 PROCEDURE — 1036F TOBACCO NON-USER: CPT | Performed by: NURSE PRACTITIONER

## 2022-04-19 PROCEDURE — 4040F PNEUMOC VAC/ADMIN/RCVD: CPT | Performed by: NURSE PRACTITIONER

## 2022-04-19 ASSESSMENT — ENCOUNTER SYMPTOMS
EYE REDNESS: 0
DIARRHEA: 0
SHORTNESS OF BREATH: 0
WHEEZING: 0
RHINORRHEA: 0
COUGH: 0
NAUSEA: 0
EYE DISCHARGE: 0
SORE THROAT: 0

## 2022-04-19 NOTE — PROGRESS NOTES
[id number] [address] PRIMARY CARE  [address]  [facility] [place] [address]  Dept: [address] [place] Davison is a 64 y.o. female Established patient, who presents today for her medical conditions/complaints as noted below. Chief Complaint   Patient presents with    Hypertension       HPI:     HPI  She us taking blood pressure a couple times per week. She is taking metoprolol succinate 12.5 mg daily and ramipril 5 mg daily. Blood pressure at home as been 128//74. Pulse has been a little lower than before she was on beta blocker but still OK. She usually takes pills together. She is not effected during floor yoga. She is able to do all the moves for yoga. Feels she walks as fast as ever. She has energy. She feel fine. She did go to [facility] with no problems.   She has not been in any real stressful situations  She is scheduled for surgery next month with Dr. Carolyn León.    Reviewed prior notes OB/GYN  Reviewed previous      LDL Cholesterol (mg/dL)   Date Value   11/04/2021 146 (H)   10/13/2020 103   10/02/2019 105       (goal LDL is <100)   AST (U/L)   Date Value   02/28/2022 26     ALT (U/L)   Date Value   02/28/2022 10     BUN (mg/dL)   Date Value   02/28/2022 12     Hemoglobin A1C (%)   Date Value   02/15/2022 5.9     TSH (mIU/L)   Date Value   02/28/2022 0.97     BP Readings from Last 3 Encounters:   04/19/22 137/75   03/22/22 (!) 142/80   03/21/22 (!) 150/86          (goal 120/80)    Past Medical History:   Diagnosis Date    CLL (chronic lymphocytic leukemia) ([facility] [address] 2004    Hyperlipidemia     Hypertension     Hypothyroidism     MRSA (methicillin resistant Staphylococcus aureus) infection       Past Surgical History:   Procedure Laterality Date    BREAST BIOPSY Right     COLONOSCOPY      TUBAL LIGATION         Family History   Problem Relation Age of Onset    Dementia Mother     Other Father         CLL       Social History     Tobacco Use    Smoking status: Never Smoker    Smokeless tobacco: Never Used   Substance Use Topics    Alcohol use: Yes     Comment: social      Current Outpatient Medications   Medication Sig Dispense Refill    metoprolol succinate (TOPROL XL) 25 MG extended release tablet Take 0.5 tablets by mouth daily 15 tablet 2    rosuvastatin (CRESTOR) 5 MG tablet Take 1 tablet by mouth daily 90 tablet 2    ramipril (ALTACE) 5 MG capsule Take 1 capsule by mouth daily 90 capsule 3    levothyroxine (SYNTHROID) 88 MCG tablet Take 1 tablet by mouth Daily 90 tablet 3     No current facility-administered medications for this visit. Allergies   Allergen Reactions    Penicillins Hives, Itching and Swelling    Epinephrine Other (See Comments)     Can tolerate lower doses, but if higher dose: will cause high heart rate. Health Maintenance   Topic Date Due    Hepatitis C screen  Never done    DTaP/Tdap/Td vaccine (1 - Tdap) Never done    Shingles Vaccine (1 of 2) Never done    Pneumococcal 65+ years Vaccine (2 - PCV) 10/27/2010    COVID-19 Vaccine (4 - Booster for Pfizer series) 01/08/2022    Depression Screen  07/13/2022    Annual Wellness Visit (AWV)  07/14/2022    Flu vaccine (Season Ended) 09/01/2022    Lipid screen  11/04/2022    TSH testing  02/28/2023    Potassium monitoring  02/28/2023    Creatinine monitoring  02/28/2023    DEXA (modify frequency per FRAX score)  Completed    Hepatitis A vaccine  Aged Out    Hepatitis B vaccine  Aged Out    Hib vaccine  Aged Out    Meningococcal (ACWY) vaccine  Aged Out       Subjective:      Review of Systems   Constitutional: Negative for chills, fatigue and fever. HENT: Negative for congestion, rhinorrhea and sore throat. Eyes: Negative for discharge and redness. Respiratory: Negative for cough, shortness of breath and wheezing. Cardiovascular: Negative for chest pain and palpitations. Gastrointestinal: Negative for diarrhea and nausea.    Genitourinary: Negative for CNP on 4/19/2022 at 4:04 PM

## 2022-04-25 DIAGNOSIS — E03.9 HYPOTHYROIDISM, UNSPECIFIED TYPE: ICD-10-CM

## 2022-04-25 RX ORDER — LEVOTHYROXINE SODIUM 88 UG/1
88 TABLET ORAL DAILY
Qty: 90 TABLET | Refills: 3 | Status: SHIPPED | OUTPATIENT
Start: 2022-04-25

## 2022-05-16 ENCOUNTER — ANESTHESIA EVENT (OUTPATIENT)
Dept: OPERATING ROOM | Age: 77
End: 2022-05-16
Payer: MEDICARE

## 2022-05-16 RX ORDER — IBUPROFEN 600 MG/1
600 TABLET ORAL EVERY 6 HOURS PRN
Qty: 30 TABLET | Refills: 1 | Status: CANCELLED | OUTPATIENT
Start: 2022-05-16

## 2022-05-17 ENCOUNTER — HOSPITAL ENCOUNTER (OUTPATIENT)
Age: 77
Setting detail: OUTPATIENT SURGERY
Discharge: HOME | End: 2022-05-17
Attending: OBSTETRICS & GYNECOLOGY | Admitting: OBSTETRICS & GYNECOLOGY
Payer: MEDICARE

## 2022-05-17 ENCOUNTER — ANESTHESIA (OUTPATIENT)
Dept: OPERATING ROOM | Age: 77
End: 2022-05-17
Payer: MEDICARE

## 2022-05-17 VITALS
TEMPERATURE: 97.6 F | DIASTOLIC BLOOD PRESSURE: 88 MMHG | RESPIRATION RATE: 19 BRPM | HEART RATE: 77 BPM | SYSTOLIC BLOOD PRESSURE: 170 MMHG | OXYGEN SATURATION: 97 % | WEIGHT: 143 LBS | HEIGHT: 62 IN | BODY MASS INDEX: 26.31 KG/M2

## 2022-05-17 PROBLEM — Z98.890 POST-OPERATIVE STATE: Status: ACTIVE | Noted: 2022-05-17

## 2022-05-17 PROBLEM — N89.5 VAGINAL STENOSIS: Status: ACTIVE | Noted: 2022-05-17

## 2022-05-17 PROCEDURE — 3600000013 HC SURGERY LEVEL 3 ADDTL 15MIN: Performed by: OBSTETRICS & GYNECOLOGY

## 2022-05-17 PROCEDURE — 6370000000 HC RX 637 (ALT 250 FOR IP)

## 2022-05-17 PROCEDURE — 2500000003 HC RX 250 WO HCPCS: Performed by: NURSE ANESTHETIST, CERTIFIED REGISTERED

## 2022-05-17 PROCEDURE — 7100000011 HC PHASE II RECOVERY - ADDTL 15 MIN: Performed by: OBSTETRICS & GYNECOLOGY

## 2022-05-17 PROCEDURE — 6360000002 HC RX W HCPCS: Performed by: NURSE ANESTHETIST, CERTIFIED REGISTERED

## 2022-05-17 PROCEDURE — 3700000000 HC ANESTHESIA ATTENDED CARE: Performed by: OBSTETRICS & GYNECOLOGY

## 2022-05-17 PROCEDURE — 7100000000 HC PACU RECOVERY - FIRST 15 MIN: Performed by: OBSTETRICS & GYNECOLOGY

## 2022-05-17 PROCEDURE — 88305 TISSUE EXAM BY PATHOLOGIST: CPT

## 2022-05-17 PROCEDURE — 3700000001 HC ADD 15 MINUTES (ANESTHESIA): Performed by: OBSTETRICS & GYNECOLOGY

## 2022-05-17 PROCEDURE — 58558 HYSTEROSCOPY BIOPSY: CPT | Performed by: OBSTETRICS & GYNECOLOGY

## 2022-05-17 PROCEDURE — 2580000003 HC RX 258: Performed by: ANESTHESIOLOGY

## 2022-05-17 PROCEDURE — 3600000003 HC SURGERY LEVEL 3 BASE: Performed by: OBSTETRICS & GYNECOLOGY

## 2022-05-17 PROCEDURE — 7100000010 HC PHASE II RECOVERY - FIRST 15 MIN: Performed by: OBSTETRICS & GYNECOLOGY

## 2022-05-17 PROCEDURE — 2709999900 HC NON-CHARGEABLE SUPPLY: Performed by: OBSTETRICS & GYNECOLOGY

## 2022-05-17 RX ORDER — METRONIDAZOLE 500 MG/100ML
INJECTION, SOLUTION INTRAVENOUS PRN
Status: DISCONTINUED | OUTPATIENT
Start: 2022-05-17 | End: 2022-05-17 | Stop reason: SDUPTHER

## 2022-05-17 RX ORDER — DIPHENHYDRAMINE HYDROCHLORIDE 50 MG/ML
12.5 INJECTION INTRAMUSCULAR; INTRAVENOUS
Status: DISCONTINUED | OUTPATIENT
Start: 2022-05-17 | End: 2022-05-17 | Stop reason: HOSPADM

## 2022-05-17 RX ORDER — OXYCODONE HYDROCHLORIDE AND ACETAMINOPHEN 5; 325 MG/1; MG/1
2 TABLET ORAL
Status: DISCONTINUED | OUTPATIENT
Start: 2022-05-17 | End: 2022-05-17 | Stop reason: HOSPADM

## 2022-05-17 RX ORDER — SODIUM CHLORIDE, SODIUM LACTATE, POTASSIUM CHLORIDE, CALCIUM CHLORIDE 600; 310; 30; 20 MG/100ML; MG/100ML; MG/100ML; MG/100ML
INJECTION, SOLUTION INTRAVENOUS CONTINUOUS
Status: DISCONTINUED | OUTPATIENT
Start: 2022-05-17 | End: 2022-05-17 | Stop reason: HOSPADM

## 2022-05-17 RX ORDER — SCOLOPAMINE TRANSDERMAL SYSTEM 1 MG/1
1 PATCH, EXTENDED RELEASE TRANSDERMAL ONCE
Status: DISCONTINUED | OUTPATIENT
Start: 2022-05-17 | End: 2022-05-17 | Stop reason: HOSPADM

## 2022-05-17 RX ORDER — SODIUM CHLORIDE 0.9 % (FLUSH) 0.9 %
5-40 SYRINGE (ML) INJECTION EVERY 12 HOURS SCHEDULED
Status: DISCONTINUED | OUTPATIENT
Start: 2022-05-17 | End: 2022-05-17 | Stop reason: HOSPADM

## 2022-05-17 RX ORDER — MORPHINE SULFATE 2 MG/ML
2 INJECTION, SOLUTION INTRAMUSCULAR; INTRAVENOUS EVERY 5 MIN PRN
Status: DISCONTINUED | OUTPATIENT
Start: 2022-05-17 | End: 2022-05-17 | Stop reason: HOSPADM

## 2022-05-17 RX ORDER — ONDANSETRON 4 MG/1
4 TABLET, ORALLY DISINTEGRATING ORAL EVERY 8 HOURS PRN
Qty: 15 TABLET | Refills: 0 | Status: SHIPPED | OUTPATIENT
Start: 2022-05-17 | End: 2022-05-31

## 2022-05-17 RX ORDER — SODIUM CHLORIDE 9 MG/ML
INJECTION, SOLUTION INTRAVENOUS PRN
Status: DISCONTINUED | OUTPATIENT
Start: 2022-05-17 | End: 2022-05-17 | Stop reason: HOSPADM

## 2022-05-17 RX ORDER — LABETALOL 20 MG/4 ML (5 MG/ML) INTRAVENOUS SYRINGE
10
Status: DISCONTINUED | OUTPATIENT
Start: 2022-05-17 | End: 2022-05-17 | Stop reason: HOSPADM

## 2022-05-17 RX ORDER — SODIUM CHLORIDE 0.9 % (FLUSH) 0.9 %
5-40 SYRINGE (ML) INJECTION PRN
Status: DISCONTINUED | OUTPATIENT
Start: 2022-05-17 | End: 2022-05-17 | Stop reason: HOSPADM

## 2022-05-17 RX ORDER — LIDOCAINE HYDROCHLORIDE 10 MG/ML
INJECTION, SOLUTION EPIDURAL; INFILTRATION; INTRACAUDAL; PERINEURAL PRN
Status: DISCONTINUED | OUTPATIENT
Start: 2022-05-17 | End: 2022-05-17 | Stop reason: SDUPTHER

## 2022-05-17 RX ORDER — IPRATROPIUM BROMIDE AND ALBUTEROL SULFATE 2.5; .5 MG/3ML; MG/3ML
1 SOLUTION RESPIRATORY (INHALATION)
Status: DISCONTINUED | OUTPATIENT
Start: 2022-05-17 | End: 2022-05-17 | Stop reason: HOSPADM

## 2022-05-17 RX ORDER — OXYCODONE HYDROCHLORIDE AND ACETAMINOPHEN 5; 325 MG/1; MG/1
1 TABLET ORAL
Status: DISCONTINUED | OUTPATIENT
Start: 2022-05-17 | End: 2022-05-17 | Stop reason: HOSPADM

## 2022-05-17 RX ORDER — CIPROFLOXACIN 2 MG/ML
INJECTION, SOLUTION INTRAVENOUS PRN
Status: DISCONTINUED | OUTPATIENT
Start: 2022-05-17 | End: 2022-05-17 | Stop reason: SDUPTHER

## 2022-05-17 RX ORDER — ACETAMINOPHEN 500 MG
1000 TABLET ORAL EVERY 8 HOURS
Qty: 180 TABLET | Refills: 0 | Status: SHIPPED | OUTPATIENT
Start: 2022-05-17 | End: 2022-10-18 | Stop reason: SDUPTHER

## 2022-05-17 RX ORDER — MEPERIDINE HYDROCHLORIDE 50 MG/ML
12.5 INJECTION INTRAMUSCULAR; INTRAVENOUS; SUBCUTANEOUS EVERY 5 MIN PRN
Status: DISCONTINUED | OUTPATIENT
Start: 2022-05-17 | End: 2022-05-17 | Stop reason: HOSPADM

## 2022-05-17 RX ORDER — PROPOFOL 10 MG/ML
INJECTION, EMULSION INTRAVENOUS PRN
Status: DISCONTINUED | OUTPATIENT
Start: 2022-05-17 | End: 2022-05-17 | Stop reason: SDUPTHER

## 2022-05-17 RX ORDER — FENTANYL CITRATE 50 UG/ML
INJECTION, SOLUTION INTRAMUSCULAR; INTRAVENOUS PRN
Status: DISCONTINUED | OUTPATIENT
Start: 2022-05-17 | End: 2022-05-17 | Stop reason: SDUPTHER

## 2022-05-17 RX ORDER — SODIUM CHLORIDE 9 MG/ML
25 INJECTION, SOLUTION INTRAVENOUS PRN
Status: DISCONTINUED | OUTPATIENT
Start: 2022-05-17 | End: 2022-05-17 | Stop reason: HOSPADM

## 2022-05-17 RX ORDER — ONDANSETRON 2 MG/ML
INJECTION INTRAMUSCULAR; INTRAVENOUS PRN
Status: DISCONTINUED | OUTPATIENT
Start: 2022-05-17 | End: 2022-05-17 | Stop reason: SDUPTHER

## 2022-05-17 RX ORDER — METRONIDAZOLE 500 MG/100ML
INJECTION, SOLUTION INTRAVENOUS
Status: COMPLETED
Start: 2022-05-17 | End: 2022-05-17

## 2022-05-17 RX ORDER — CIPROFLOXACIN 2 MG/ML
INJECTION, SOLUTION INTRAVENOUS
Status: COMPLETED
Start: 2022-05-17 | End: 2022-05-17

## 2022-05-17 RX ORDER — PROMETHAZINE HYDROCHLORIDE 25 MG/ML
6.25 INJECTION, SOLUTION INTRAMUSCULAR; INTRAVENOUS EVERY 5 MIN PRN
Status: DISCONTINUED | OUTPATIENT
Start: 2022-05-17 | End: 2022-05-17 | Stop reason: HOSPADM

## 2022-05-17 RX ORDER — SODIUM CHLORIDE 9 MG/ML
INJECTION, SOLUTION INTRAVENOUS CONTINUOUS
Status: DISCONTINUED | OUTPATIENT
Start: 2022-05-17 | End: 2022-05-17 | Stop reason: HOSPADM

## 2022-05-17 RX ORDER — MIDAZOLAM HYDROCHLORIDE 2 MG/2ML
2 INJECTION, SOLUTION INTRAMUSCULAR; INTRAVENOUS
Status: DISCONTINUED | OUTPATIENT
Start: 2022-05-17 | End: 2022-05-17 | Stop reason: HOSPADM

## 2022-05-17 RX ORDER — ONDANSETRON 2 MG/ML
4 INJECTION INTRAMUSCULAR; INTRAVENOUS
Status: DISCONTINUED | OUTPATIENT
Start: 2022-05-17 | End: 2022-05-17 | Stop reason: HOSPADM

## 2022-05-17 RX ORDER — SCOLOPAMINE TRANSDERMAL SYSTEM 1 MG/1
PATCH, EXTENDED RELEASE TRANSDERMAL
Status: DISCONTINUED
Start: 2022-05-17 | End: 2022-05-17 | Stop reason: HOSPADM

## 2022-05-17 RX ADMIN — LIDOCAINE HYDROCHLORIDE 50 MG: 10 INJECTION, SOLUTION EPIDURAL; INFILTRATION; INTRACAUDAL; PERINEURAL at 10:31

## 2022-05-17 RX ADMIN — CIPROFLOXACIN 400 MG: 2 INJECTION, SOLUTION INTRAVENOUS at 10:28

## 2022-05-17 RX ADMIN — SODIUM CHLORIDE, POTASSIUM CHLORIDE, SODIUM LACTATE AND CALCIUM CHLORIDE: 600; 310; 30; 20 INJECTION, SOLUTION INTRAVENOUS at 09:22

## 2022-05-17 RX ADMIN — PROPOFOL 50 MG: 10 INJECTION, EMULSION INTRAVENOUS at 10:33

## 2022-05-17 RX ADMIN — ONDANSETRON 4 MG: 2 INJECTION INTRAMUSCULAR; INTRAVENOUS at 11:15

## 2022-05-17 RX ADMIN — PROPOFOL 125 MG: 10 INJECTION, EMULSION INTRAVENOUS at 10:31

## 2022-05-17 RX ADMIN — FENTANYL CITRATE 25 MCG: 50 INJECTION, SOLUTION INTRAMUSCULAR; INTRAVENOUS at 11:24

## 2022-05-17 RX ADMIN — METRONIDAZOLE 500 MG: 500 INJECTION, SOLUTION INTRAVENOUS at 11:06

## 2022-05-17 RX ADMIN — FENTANYL CITRATE 25 MCG: 50 INJECTION, SOLUTION INTRAMUSCULAR; INTRAVENOUS at 10:42

## 2022-05-17 ASSESSMENT — PAIN SCALES - GENERAL
PAINLEVEL_OUTOF10: 2
PAINLEVEL_OUTOF10: 2

## 2022-05-17 ASSESSMENT — PAIN - FUNCTIONAL ASSESSMENT: PAIN_FUNCTIONAL_ASSESSMENT: NONE - DENIES PAIN

## 2022-05-17 NOTE — H&P
Hives, Itching, and Swelling 04/15/2019    Epinephrine Other (See Comments) 04/15/2019       MEDICATIONS:  Current Facility-Administered Medications   Medication Dose Route Frequency Provider Last Rate Last Admin    0.9 % sodium chloride infusion   IntraVENous Continuous Bethany Leigh MD        lactated ringers infusion   IntraVENous Continuous Bethany Leigh  mL/hr at 05/17/22 1014 NoRateChange at 05/17/22 1014    sodium chloride flush 0.9 % injection 5-40 mL  5-40 mL IntraVENous 2 times per day Bethany Leigh MD        sodium chloride flush 0.9 % injection 5-40 mL  5-40 mL IntraVENous PRN Bethany Leigh MD        0.9 % sodium chloride infusion   IntraVENous PRN Bethany Leigh MD        scopolamine (TRANSDERM-SCOP) transdermal patch 1 patch  1 patch TransDERmal Once Annie Donnelly MD   1 patch at 05/17/22 0923    metroNIDAZOLE (FLAGYL) 500 MG/100ML IVPB premix             ciprofloxacin (CIPRO) 400 MG/200ML IVPB                FAMILY HISTORY:  family history includes Dementia in her mother; Other in her father. SOCIAL HISTORY:   reports that she has never smoked. She has never used smokeless tobacco. She reports current alcohol use. She reports that she does not use drugs.     VITALS:  Vitals:    05/17/22 0904 05/17/22 0910   BP: (!) 184/86 (!) 167/88   Pulse: 79    Resp: 16    Temp: 97.4 °F (36.3 °C)    TempSrc: Temporal    SpO2: 100%    Weight: 143 lb (64.9 kg)    Height: 5' 2\" (1.575 m)      PHYSICAL EXAM:     Unchanged from Prior H&P  CONSTITUTIONAL:  Alert and oriented, no acute distress  HEAD: normocephalic, atraumatic  EYES: Pupils equal and reactive to light, Extraocular muscles intact, sclera non icteric  ENT: Mucus membranes moist, No otorrhea, no rhinorrhea  NECK:  supple, symmetrical, trachea midline   LUNGS:  Good air movement bilaterally, unlabored respirations, no wheezes or rhonchi  CARDIOVASCULAR: Regular rate and rhythm, no murmurs rubs or gallops  ABDOMEN: soft, non tender, non distended, no rebound or PELVIS WITH CONTRAST 1/14/2022 2:47 pm       TECHNIQUE:   CT of the abdomen and pelvis was performed with the administration of   intravenous contrast. Multiplanar reformatted images are provided for review. Dose modulation, iterative reconstruction, and/or weight based adjustment of   the mA/kV was utilized to reduce the radiation dose to as low as reasonably   achievable.       COMPARISON:   None.       HISTORY:   ORDERING SYSTEM PROVIDED HISTORY: Vaginal irritation   TECHNOLOGIST PROVIDED HISTORY:       pelvic/vaginal pressure, difficult insert speculum with exam- mass/prolapse? inability visualize uterus on US   Reason for Exam: pelvic/vaginal pressure, difficult insert speculum with   exam- mass/prolapse? inability visualize uterus on US   Additional signs and symptoms: vag irritation       FINDINGS:   Lower Chest: Mild basilar atelectasis is noted.  Small hiatal hernia.       Organs: Spleen is top-normal in size without focal mass.  Liver, pancreas,   gallbladder, adrenals, and kidneys demonstrate no acute abnormality although   a 14 mm cyst is present in the right kidney.       GI/Bowel: No free fluid, free air or bowel obstruction is noted.  Small   periumbilical fat containing hernia without strangulation is present.  A few   colonic diverticula are present without acute diverticulitis.       Pelvis: Appendix is visualized.  There is no appendicitis.  Bladder   demonstrates no gross abnormality.  Uterus is abnormal in appearance.    Endometrial cavity is thickened to 12 x 16 mm suggestive of endometrial   pathology given age. Jose Sails free pelvic fluid, pelvic or inguinal adenopathy is   noted. Octavia Scrivener is air in the vagina.  Fistulous formation cannot be excluded   on this noncontrast study.       Peritoneum/Retroperitoneum: No aortic aneurysm, retroperitoneal or mesenteric   adenopathy is noted.       Bones/Soft Tissues: No acute osseous or soft tissue abnormality.  Facet   arthropathy lower lumbar spine is noted.           Impression   1.  Abnormal endometrial cavity thickness suspicious for endometrial   pathology.       2.  There is air in the vaginal canal which is irregular.  Fistulous   formation not excluded.  Vaginal mass is difficult to exclude due to   heterogeneity.       RECOMMENDATIONS:   Correlation with outside pelvic ultrasound advised, images and report not   available to us. [facility] imaging may prove helpful for further assessment. DIAGNOSIS & PLAN:  1. Thickened endometrium in postmenopausal female  2. Vaginal discharge   - Proceed with planned procedure: Hysteroscopy with dilation and curettage, MyoSure with exam under anesthesia and cervical biopsy   - Consent signed, on chart. - The patient is ready for transport to the operative suite. Counseling: The patient was counseled on all options both medical and surgical, conservative as well as definitive. She has elected to proceed with the procedure as stated above. The patient was counseled on the procedure. Risks and complications were reviewed in detail. The patients orders, labs, consents have been completed. The history and physical as well as all supporting surgical documentation will be forwarded to the pre-operative holding area. The patient is aware that this procedure may not alleviate her symptoms. That there may be a necessity for a second surgery and that there may be an incomplete removal of abnormal tissue.     Shannan Aguilar MD  Ob/Gyn Resident  [facility] [place] [address]  5/17/2022, 10:19 AM

## 2022-05-17 NOTE — ANESTHESIA POSTPROCEDURE EVALUATION
Department of Anesthesiology  Postprocedure Note    Patient: Oral Speaks  MRN: [id number]  YOB: 1945  Date of evaluation: 5/17/2022  Time:  11:54 AM     Procedure Summary     Date: 05/17/22 Room / Location: [address]    Anesthesia Start: 3773 Anesthesia Stop: 5435    Procedure: DILATATION AND CURETTAGE HYSTEROSCOPY MYOSURE WITH EXAM UNDER ANESTHESIA AND CERVICAL BIOPSY (N/A Vagina ) Diagnosis: (THICKENED ENDOMETRIUM,  ABNORMAL VAGINAL EXAM, ABNORMAL ULTRASOUND)    Surgeons: Louvella Castleman, DO Responsible Provider: Ciro Renteria MD    Anesthesia Type: general ASA Status: 2          Anesthesia Type: No value filed. Anya Phase I: Anya Score: 8    Anya Phase II:      Last vitals: Reviewed and per EMR flowsheets.        Anesthesia Post Evaluation    Patient location during evaluation: PACU  Patient participation: complete - patient participated  Level of consciousness: awake and alert  Airway patency: patent  Nausea & Vomiting: no nausea and no vomiting  Complications: no  Cardiovascular status: hemodynamically stable  Respiratory status: nasal cannula and spontaneous ventilation  Hydration status: euvolemic  Multimodal analgesia pain management approach

## 2022-05-17 NOTE — ANESTHESIA PRE PROCEDURE
Department of Anesthesiology  Preprocedure Note       Name:  Deonte Jose   Age:  64 y.o.  :  1945                                          MRN:  [id number]         Date:  2022      Surgeon: Aidan Simon):  Mariusz Berman DO    Procedure: Procedure(s):  DILATATION AND CURETTAGE HYSTEROSCOPY MYOSURE WITH EXAM UNDER ANESTHESIA AND CERVICAL BIOPSY    Medications prior to admission:   Prior to Admission medications    Medication Sig Start Date End Date Taking? Authorizing Provider   levothyroxine (SYNTHROID) 88 MCG tablet Take 1 tablet by mouth Daily 22   KIRA Joseph CNP   metoprolol succinate (TOPROL XL) 25 MG extended release tablet Take 0.5 tablets by mouth daily  Patient taking differently: Take 12.5 mg by mouth daily Every other day 3/22/22   KIRA Joseph CNP   rosuvastatin (CRESTOR) 5 MG tablet Take 1 tablet by mouth daily 10/4/21   KIRA Joseph CNP   ramipril (ALTACE) 5 MG capsule Take 1 capsule by mouth daily 21   KIRA Joseph CNP       Current medications:    Current Facility-Administered Medications   Medication Dose Route Frequency Provider Last Rate Last Admin    0.9 % sodium chloride infusion   IntraVENous Continuous Ivan Hurt MD        lactated ringers infusion   IntraVENous Continuous Ivan Hrut MD        sodium chloride flush 0.9 % injection 5-40 mL  5-40 mL IntraVENous 2 times per day Ivan Hurt MD        sodium chloride flush 0.9 % injection 5-40 mL  5-40 mL IntraVENous PRN Ivan Hurt MD        0.9 % sodium chloride infusion   IntraVENous PRN Ivan Hurt MD        scopolamine (TRANSDERM-SCOP) transdermal patch 1 patch  1 patch TransDERmal Once Claudette Morales MD           Allergies: Allergies   Allergen Reactions    Penicillins Hives, Itching and Swelling    Epinephrine Other (See Comments)     Can tolerate lower doses, but if higher dose: will cause high heart rate.         Problem List:    Patient Active Problem List   Diagnosis Code    Mixed hyperlipidemia E78.2    Hypothyroidism E03.9    Essential hypertension I10    CLL (chronic lymphocytic leukemia) ([facility]) C91.10    History of MRSA infection Z86.14    Thrombocytopenia ([facility]) D69.6    Hypogammaglobulinemia ([facility]) D80.1       Past Medical History:        Diagnosis Date    CLL (chronic lymphocytic leukemia) ([facility] [address] 2004    Hyperlipidemia     Hypertension     Hypothyroidism     Leukocytosis     MRSA (methicillin resistant Staphylococcus aureus) infection     Thrombocytopenia ([facility] [address]        Past Surgical History:        Procedure Laterality Date    BREAST BIOPSY Right     COLONOSCOPY      TUBAL LIGATION         Social History:    Social History     Tobacco Use    Smoking status: Never Smoker    Smokeless tobacco: Never Used   Substance Use Topics    Alcohol use: Yes     Comment: social                                Counseling given: Not Answered      Vital Signs (Current):   Vitals:    05/17/22 0904 05/17/22 0910   BP: (!) 184/86 (!) 167/88   Pulse: 79    Resp: 16    Temp: 97.4 °F (36.3 °C)    TempSrc: Temporal    SpO2: 100%    Weight: 143 lb (64.9 kg)    Height: 5' 2\" (1.575 m)                                               BP Readings from Last 3 Encounters:   05/17/22 (!) 167/88   04/19/22 137/75   03/22/22 (!) 142/80       NPO Status: Time of last liquid consumption: 1830                        Time of last solid consumption: 1830                        Date of last liquid consumption: 05/16/22                        Date of last solid food consumption: 05/16/22    BMI:   Wt Readings from Last 3 Encounters:   05/17/22 143 lb (64.9 kg)   04/19/22 142 lb 3.2 oz (64.5 kg)   03/22/22 142 lb 12.8 oz (64.8 kg)     Body mass index is 26.16 kg/m².     CBC:   Lab Results   Component Value Date    WBC 31.7 02/28/2022    RBC 4.13 02/28/2022    HGB 13.4 02/28/2022    HCT 40.9 02/28/2022    MCV 99.0 02/28/2022    RDW 14.0 02/28/2022     02/28/2022       CMP:   Lab Results   Component Value Date     02/28/2022    K 4.9 02/28/2022     02/28/2022    CO2 29 02/28/2022    BUN 12 02/28/2022    CREATININE 0.74 02/28/2022    GFRAA >60 02/28/2022    LABGLOM >60 02/28/2022    GLUCOSE 90 02/28/2022    PROT 6.4 02/28/2022    CALCIUM 9.5 02/28/2022    BILITOT 0.54 02/28/2022    ALKPHOS 75 02/28/2022    AST 26 02/28/2022    ALT 10 02/28/2022       POC Tests: No results for input(s): POCGLU, POCNA, POCK, POCCL, POCBUN, POCHEMO, POCHCT in the last 72 hours. Coags: No results found for: PROTIME, INR, APTT    HCG (If Applicable): No results found for: PREGTESTUR, PREGSERUM, HCG, HCGQUANT     ABGs: No results found for: PHART, PO2ART, EEA1IVO, SVD3GUS, BEART, B1QEUHWX     Type & Screen (If Applicable):  No results found for: LABABO, LABRH    Drug/Infectious Status (If Applicable):  No results found for: HIV, HEPCAB    COVID-19 Screening (If Applicable): No results found for: COVID19        Anesthesia Evaluation  Patient summary reviewed and Nursing notes reviewed  Airway: Mallampati: II  TM distance: >3 FB   Neck ROM: full  Mouth opening: > = 3 FB Dental:      Comment: -LOWER PERMANENT IMPLANT    Pulmonary:Negative Pulmonary ROS and normal exam                               Cardiovascular:    (+) hypertension:,                   Neuro/Psych:   Negative Neuro/Psych ROS              GI/Hepatic/Renal: Neg GI/Hepatic/Renal ROS            Endo/Other:    (+) hypothyroidism::., malignancy/cancer. ROS comment: -LEUKEMIA  -NPO AFTER MIDNIGHT  -ALLERGIES - PCN Abdominal:             Vascular: negative vascular ROS. Other Findings:             Anesthesia Plan      general     ASA 2     (LMA)  Induction: intravenous. MIPS: Postoperative opioids intended and Prophylactic antiemetics administered. Anesthetic plan and risks discussed with patient. Plan discussed with CRNA.     Attending anesthesiologist reviewed and agrees with Leander Klinefelter, MD 5/17/2022

## 2022-05-17 NOTE — OP NOTE
Operative Note  Department of Obstetrics and Gynecology  [facility]       Patient: Deonte Jose   : 1945  MRN: [id number]       Acct: [de-identified]   PCP: KIRA Joseph CNP  Date of Procedure: 22    Pre-operative Diagnosis: 64 y.o. female    Thickened endometrium in postmenopausal female  Vaginal discharge  Suspected vaginal fistula   Abnormal pelvic imaging     Post-operative Diagnosis: same as above, extensive vaginal stenosis, cervical stenosis, vaginal atrophy     Procedure: Dilation and curettage, hysteroscopy, exam under anesthesia, ECC, cervical biopsy    Surgeon: Dr. Tawanda Hidalgo     Assistant(s): Claudette Morales MD, PGY1  Anesthesia: general via LMA    Indications: Patient has been having watery vaginal discharge and associated vaginal irritation. CT abdomen pelvis showed an abnormally thickened endometrium and air in the vaginal canal.  Transvaginal ultrasound was unable to measure the endometrium and there was limited visualization of the uterus. Patient declined in office pelvic exam and is opting for an exam under anesthesia with endometrial sampling. Proceed with planned procedure. Procedure Details: The patient was seen in the pre-op room. The risks, benefits, complications, treatment options, and expected outcomes were discussed with the patient. The patient concurred with the proposed plan, giving informed consent. The patient was taken to the Operating Room and identified as Deonte Larger and the procedure was verified. A Time Out was held and the above information confirmed. After administration of general anesthesia, the patient was placed in the dorsolithotomy position with yellowfin stirrups. The patient was prepped and draped in the usual sterile fashion. The bladder was drained with a straight catheter.  On examination, patient had a very narrow introitus and significant vaginal atrophy and stenosis and was unable to accommodate the weighted speculum. There was a very tight ring of stenotic/atrophic tissue mid vagina. Cervix appeared to be flush with posterior vaginal wall but was hard to truly palpate and visualize. As a result, a small double end mao retractor was used to try to aid in visualization of the cervix. This was unsuccessful due to extensive atrophy and stenosis of the vagina. An [employer] retractor was also used to try to visualize the cervix, but again, this was unsuccessful. Due to poor visualization of the cervix with various small retractors, no tenaculum was used on the cervix. Hysteroscope was used vaginally to evaluate the mucosa and see if we could identify cervix. No fistulous formation was appreciated and small hole noted anterior deep vagina slightly to patient left that was likely external cervical os. With gentle and meticulous digital examination, the cervix was dilated with hegar dilators up to size 8mm using the index and middle finger as a tactile guide. Rectal exam was simultaneously performed to ensure that the opening palpated in the vagina was not a fistula that tracked into the rectum. Once no fistulous tracking was confirmed on rectal examination, gloves were then changed and biopsy forceps were used to take small biopsy bite of what was suspected cervical tissue. Then gentle ECC was performed again with digital guidance to confirm proper positioning of the instrument and the specimen collected were sent for pathologic examination. A small mao curette was placed into the cervix and uterine cavity with careful digital guidance, endometrial curettage was carried out yielding curettings which were collected and sent for pathologic examination. A small cervical biopsy was obtained with a laparoscopic biopsy forceps under digital guidance. A hysteroscope was placed into the vagina and attempt was made to enter the cervix under direct visualization.  However, due to significant vaginal atrophy, stenosis and the inability to place and provide countertraction with a single tooth tenaculum on the cervix at the beginning of the case, the hysteroscope was unable to be advanced into the uterus. All instruments were removed from the vagina and hemostasis was noted at the end of the case. Instrument, sponge, and needle counts were correct at the conclusion of the case. SCDs for DVT prophylaxis remain in place for the post operative period. Findings: Normal sized anteverted uterus. Normal appearing external genitalia, narrow vaginal introitus with extensive vaginal atropy and stenosis, cervical stenosis  Total IV fluids/Blood products:  700 ml crystalloid  Urine Output:  75 ml    Estimated blood loss:  5 mL  Drains:  none  Specimens:  Endometrial, endocervical curettings, cervical biopsy  Instrument and Sponge Count: Correct  Complications:  none  Condition:  good, transferred to post anesthesia recovery    Colby Sheridan MD  Ob/Gyn Resident  2022, 6:21 PM        Date: 2022  Time: 10:42 AM    Patient Name: Randy Christianson  Patient : 1945  Room/Bed: [facility] OR [place] RM/NONE  Admission Date/Time: 2022  8:29 AM  MRN #: [id number]  CSN #: [id number]      Attending Attestation:   I was present and scrubbed for the entire procedure.     Attending Name:  Jamie Diana DO

## 2022-05-17 NOTE — BRIEF OP NOTE
Brief Operative Note  Department of Obstetrics and Gynecology  [facility]     Patient: Criss Morgan   : 1945  MRN: [id number]       Acct: [de-identified]   Date of Procedure: 22     Pre-operative Diagnosis: 64 y.o. female    Thickened endometrium in postmenopausal female  Vaginal discharge  Suspected vaginal fistula     Post-operative Diagnosis: same as above, extensive vaginal stenosis, cervical stenosis, vaginal atrophy    Procedure: Dilation and curettage, hysteroscopy, exam under anesthesia, ECC, cervical biopsy    Surgeon: Dr. Carolyn León     Assistant(s): Ned Sicard, MD, PGY1  Anesthesia: general via LMA    Findings: Normal sized anteverted uterus. Normal appearing external genitalia, narrow vaginal introitus with extensive vaginal and cervical stenosis - No signs of vaginal fistula but severe atrophic stenotic ring in mid vagina  Total IV fluids/Blood products:  700 ml crystalloid  Urine Output:  75 ml    Estimated blood loss:  5 mL  Drains:  none  Specimens:  Endometrial, endocervical curettings, cervical biopsy  Instrument and Sponge Count: Correct  Complications:  none  Condition:  good, transferred to post anesthesia recovery    See full operative report for further details. Ned Sicard, MD  Ob/Gyn Resident  2022, 11:27 AM          Date: 2022  Time: 10:30 AM    Patient Name: Criss Morgan  Patient : 1945  Room/Bed: [facility] RM/NONE  Admission Date/Time: 2022  8:29 AM  MRN #: [id number]  [facility] #: [id number]      Attending Attestation:   I was present and scrubbed for the entire procedure.     Attending Name:  Janes Horn DO

## 2022-05-18 LAB — SURGICAL PATHOLOGY REPORT: NORMAL

## 2022-05-18 NOTE — PROGRESS NOTES
CLINICAL PHARMACY NOTE: MEDS TO BEDS    Total # of Prescriptions Filled: 1   The following medications were delivered to the patient:  · Ondansetron ODT 4mg    Additional Documentation:    Patient stated they had tylenol at home

## 2022-05-23 RX ORDER — RAMIPRIL 5 MG/1
5 CAPSULE ORAL DAILY
Qty: 90 CAPSULE | Refills: 3 | Status: SHIPPED | OUTPATIENT
Start: 2022-05-23

## 2022-05-31 ENCOUNTER — OFFICE VISIT (OUTPATIENT)
Dept: OBGYN CLINIC | Age: 77
End: 2022-05-31

## 2022-05-31 VITALS
SYSTOLIC BLOOD PRESSURE: 124 MMHG | DIASTOLIC BLOOD PRESSURE: 64 MMHG | BODY MASS INDEX: 26.5 KG/M2 | HEIGHT: 62 IN | WEIGHT: 144 LBS

## 2022-05-31 DIAGNOSIS — Z98.890 POST-OPERATIVE STATE: Primary | ICD-10-CM

## 2022-05-31 DIAGNOSIS — N95.2 VAGINAL ATROPHY: ICD-10-CM

## 2022-05-31 PROCEDURE — 99024 POSTOP FOLLOW-UP VISIT: CPT | Performed by: OBSTETRICS & GYNECOLOGY

## 2022-05-31 NOTE — PROGRESS NOTES
Herberth Wooten  2022  2:00 PM    Herberth Wooten  Procedure:    Diagnosis Orders   1. S/p EUA, Hscope, D&C, cervical biopsy 22     2. Vaginal atrophy       Herberth Wooten is a 64 y.o. female     The patient was seen, she denies any complaints. She denied any shortness of breath, chest pain or dizziness. She denied any nausea, vomiting, or diarrhea. There is no fever, chills, or rigors. The patient denies any vaginal bleeding, discharge or odor. All of her pre-operative complaints are now resolved. She feels well, discussed no malignant cells or neoplastic cells, however non diagnostic as well due to sparse sampling. Discussed extreme atrophy and cavity narrowing that made procedure difficult. Blood pressure 124/64, height 5' 2\" (1.575 m), weight 144 lb (65.3 kg), not currently breastfeeding. Abdominal Exam: soft non-tender. Good bowel sounds. No guarding, rebound or rigidity. No costal vertebral angle tenderness bilateral. No hernias    Incision: not incision    Extremities: No edema or calf pain noted bilaterally. Pelvic Exam:Exam deferred. Results for orders placed or performed during the hospital encounter of 22   SURGICAL PATHOLOGY REPORT   Result Value Ref Range    Surgical Pathology Report       -- Diagnosis --  A.  VAGINAL BIOPSY:       -SCANT NONNEOPLASTIC SQUAMOUS CELLS; INSUFFICIENT FOR DIAGNOSIS. [place] HYSTEROSCOPIC TISSUE:       -STRIPS OF MATURE SQUAMOUS MUCOSA.       -NO SPECIFIC HISTOLOGIC ABNORMALITY. C.  CERVIX, BIOPSY:       -FIBROUS CONNECTIVE TISSUE WITH CHRONIC INFLAMMATION. D.  ENDOMETRIUM, CURETTINGS:       -SCANT GLAND AND SQUAMOUS CELLS WITHOUT INTACT TISSUE.        -NO NEOPLASTIC CHANGE IDENTIFIED.        Carolina Barnett M.D.  **Electronically Signed Out**  [facility]/2022       Clinical Information  Pre-op Diagnosis:  THICKENED ENDOMETRIUM, ABNORMAL VAGINAL EXAM,  ABNORMAL ULTRASOUND   Operative Findings:  VAGINAL BIOPSY; MYOSURE HYSTEROSCOPIC TISSUE FOR  CYTOLOGY; CERVICAL BIOPSY; ENDOMETRIAL CURETTINGS  Operation Performed:  DILATATION AND CURETTAGE HYSTEROSCOPY MYOSURE  WITH EXAM UNDER ANESTHESIA AND CERVICAL BIOPSY     Source of Specimen  A: VAGINAL BIOPSY  B: MYOSURE HYSTERSCOPIC TISSUE FOR CYTOLOGY  C: CERVICAL BIOPSY  D: ENDOMETRIAL CURRET TINGS    Gross Description  A. \"JOSIAH BENEDICT, VAGINAL BIOPSY\" Received on Telfa are a few  minute flecks that may not survive processing. Entirely 1cs. B.  \"JOSIAH BENEDICT, MYOSURE HYSTEROSCOPIC TISSUE FOR CYTOLOGY\" Few  red and white fragments, 0.3 x 0.2 x 0.1 cm in aggregate. Entirely  1cs. C.  \"JOSIAH BENEDICT, CERVICAL BIOPSY\" 0.2 x 0.2 x 0.2 cm tan  fragment. Intact, 1cs. D.  \"JOSIAH BENEDICT, ENDOMETRIAL CURETTINGS\" Red flecks, 0.3 x 0.2 x  0.1 cm in aggregate, which may not survive processing. Entirely 1cs. tm       Microscopic Description  A-D. Microscopic examination performed. SURGICAL PATHOLOGY CONSULTATION       Patient Name: Isaura Concepcion  [facility] Rec: [id number]  Path Number: [id number]    [facility] [address] [facility], [address]  [phone]  Fax: [phone]             Assessment:      Diagnosis Orders   1. S/p EUA, Hscope, D&C, cervical biopsy 5/17/22     2.  Vaginal atrophy          Patient Active Problem List    Diagnosis Date Noted    S/p EUA, Hscope, D&C, cervical biopsy 5/17/22 05/17/2022     Priority: Medium    Vaginal & cervical stenosis 05/17/2022     Priority: Medium    Vaginal discharge      Priority: Medium    Abnormal CT scan, pelvis      Priority: Medium    Hypogammaglobulinemia ([address] 01/12/2021    Thrombocytopenia ([address] 01/06/2020    Mixed hyperlipidemia 04/15/2019    Hypothyroidism 04/15/2019    Essential hypertension 04/15/2019    CLL (chronic lymphocytic leukemia) ([facility] [address] 04/15/2019    History of MRSA infection 04/15/2019          POD# 2 weeks   Procedure: see above   Stable   Pathology reviewed and found to be benign. Yes    Plan:   Return in about 3 months (around 8/31/2022), or if symptoms worsen or fail to improve, for follow up concerns. Restrictions lifted  Vaginal atrophy - consider Intrarosa after full workup completed    Will discussed with [facility]YN[facility] to discuss if further workup is warranted.     Elzbieta Dux, DO

## 2022-06-07 ENCOUNTER — HOSPITAL ENCOUNTER (OUTPATIENT)
Age: 77
Discharge: HOME | End: 2022-06-07
Payer: MEDICARE

## 2022-06-07 ENCOUNTER — TELEPHONE (OUTPATIENT)
Dept: OBGYN CLINIC | Age: 77
End: 2022-06-07

## 2022-06-07 DIAGNOSIS — Z98.890 POST-OPERATIVE STATE: Primary | ICD-10-CM

## 2022-06-07 DIAGNOSIS — N89.8 VAGINAL DISCHARGE: ICD-10-CM

## 2022-06-07 DIAGNOSIS — D80.1 HYPOGAMMAGLOBULINEMIA (HCC): ICD-10-CM

## 2022-06-07 DIAGNOSIS — C91.10 CLL (CHRONIC LYMPHOCYTIC LEUKEMIA) (HCC): ICD-10-CM

## 2022-06-07 DIAGNOSIS — N89.5 VAGINAL STENOSIS: ICD-10-CM

## 2022-06-07 DIAGNOSIS — D69.6 THROMBOCYTOPENIA (HCC): ICD-10-CM

## 2022-06-07 DIAGNOSIS — R93.89 ABNORMAL CT SCAN: ICD-10-CM

## 2022-06-07 DIAGNOSIS — R93.5 ABNORMAL CT SCAN, PELVIS: ICD-10-CM

## 2022-06-07 LAB
ABSOLUTE EOS #: 0 K/UL (ref 0–0.4)
ABSOLUTE LYMPH #: 23.31 K/UL (ref 1–4.8)
ABSOLUTE MONO #: 0.8 K/UL (ref 0.1–0.8)
ALBUMIN SERPL-MCNC: 4.6 G/DL (ref 3.5–5.2)
ALBUMIN/GLOBULIN RATIO: 2.4 (ref 1–2.5)
ALP BLD-CCNC: 80 U/L (ref 35–104)
ALT SERPL-CCNC: 9 U/L (ref 5–33)
ANION GAP SERPL CALCULATED.3IONS-SCNC: 8 MMOL/L (ref 9–17)
AST SERPL-CCNC: 25 U/L
BASOPHILS # BLD: 0 % (ref 0–2)
BASOPHILS ABSOLUTE: 0 K/UL (ref 0–0.2)
BILIRUB SERPL-MCNC: 0.67 MG/DL (ref 0.3–1.2)
BUN BLDV-MCNC: 13 MG/DL (ref 8–23)
CALCIUM SERPL-MCNC: 9.9 MG/DL (ref 8.6–10.4)
CHLORIDE BLD-SCNC: 104 MMOL/L (ref 98–107)
CO2: 29 MMOL/L (ref 20–31)
CREAT SERPL-MCNC: 0.71 MG/DL (ref 0.5–0.9)
EOSINOPHILS RELATIVE PERCENT: 0 % (ref 1–4)
GFR AFRICAN AMERICAN: >60 ML/MIN
GFR NON-AFRICAN AMERICAN: >60 ML/MIN
GFR SERPL CREATININE-BSD FRML MDRD: ABNORMAL ML/MIN/{1.73_M2}
GLUCOSE BLD-MCNC: 111 MG/DL (ref 70–99)
HCT VFR BLD CALC: 41.5 % (ref 36–46)
HEMOGLOBIN: 13.5 G/DL (ref 12–16)
IGA, LOW RANGE: 39 MG/DL (ref 70–400)
IGA: ABNORMAL MG/DL (ref 70–400)
IGG: 442 MG/DL (ref 700–1600)
IGM: <25 MG/DL (ref 40–230)
LACTATE DEHYDROGENASE: 217 U/L (ref 135–214)
LYMPHOCYTES # BLD: 88 % (ref 24–44)
MCH RBC QN AUTO: 32.2 PG (ref 26–34)
MCHC RBC AUTO-ENTMCNC: 32.6 G/DL (ref 31–37)
MCV RBC AUTO: 98.7 FL (ref 80–100)
MONOCYTES # BLD: 3 % (ref 1–7)
MORPHOLOGY: ABNORMAL
PDW BLD-RTO: 13.7 % (ref 12.5–15.4)
PLATELET # BLD: 89 K/UL (ref 140–450)
PMV BLD AUTO: 6.8 FL (ref 6–12)
POTASSIUM SERPL-SCNC: 4.4 MMOL/L (ref 3.7–5.3)
RBC # BLD: 4.2 M/UL (ref 4–5.2)
SEG NEUTROPHILS: 9 % (ref 36–66)
SEGMENTED NEUTROPHILS ABSOLUTE COUNT: 2.39 K/UL (ref 1.8–7.7)
SODIUM BLD-SCNC: 141 MMOL/L (ref 135–144)
TOTAL PROTEIN: 6.5 G/DL (ref 6.4–8.3)
WBC # BLD: 26.5 K/UL (ref 3.5–11)

## 2022-06-07 PROCEDURE — 85025 COMPLETE CBC W/AUTO DIFF WBC: CPT

## 2022-06-07 PROCEDURE — 83615 LACTATE (LD) (LDH) ENZYME: CPT

## 2022-06-07 PROCEDURE — 80053 COMPREHEN METABOLIC PANEL: CPT

## 2022-06-07 PROCEDURE — 36415 COLL VENOUS BLD VENIPUNCTURE: CPT

## 2022-06-07 PROCEDURE — 82784 ASSAY IGA/IGD/IGG/IGM EACH: CPT

## 2022-06-07 NOTE — TELEPHONE ENCOUNTER
----- Message from Lesley Johnson DO sent at 6/5/2022  1:18 PM EDT -----  Please call and notify patient that I have discussed this with [facility]YN [facility]. There is no immediate need for a referral and work up. However, they are recommending a Pelvic MRI w/ and w/o contrast as well as a gastrografin enema study to evaluate for further pathology and possible fistula formation. Please notify patient and place orders. If she has any further questions I am happy to talk to them or we can place the referral for GYN oncology if they would like, but after reviewing her case with [facility]YN [facility] these were the recommendations. Thank you.

## 2022-06-07 NOTE — TELEPHONE ENCOUNTER
Pt was explained all of Dr Jacobs Doctor recommendation- at this time patient would like a consult with Dr Melchor Toscano prior to completing any imaging or testing- she has questions on why the testing is needed- what are we looking for- if she is having no sx or what symptoms she would have if we have a concern for fistula.  Stated that understandable and will put the referral in for consult-

## 2022-06-15 ENCOUNTER — TELEPHONE (OUTPATIENT)
Dept: ONCOLOGY | Age: 77
End: 2022-06-15

## 2022-06-15 ENCOUNTER — TELEPHONE (OUTPATIENT)
Dept: OBGYN CLINIC | Age: 77
End: 2022-06-15

## 2022-06-15 ENCOUNTER — OFFICE VISIT (OUTPATIENT)
Dept: ONCOLOGY | Age: 77
End: 2022-06-15
Payer: MEDICARE

## 2022-06-15 VITALS
TEMPERATURE: 97.5 F | HEART RATE: 70 BPM | SYSTOLIC BLOOD PRESSURE: 183 MMHG | WEIGHT: 143.3 LBS | DIASTOLIC BLOOD PRESSURE: 81 MMHG | BODY MASS INDEX: 26.21 KG/M2

## 2022-06-15 DIAGNOSIS — D80.1 HYPOGAMMAGLOBULINEMIA (HCC): ICD-10-CM

## 2022-06-15 DIAGNOSIS — R74.02 ELEVATED LDH: ICD-10-CM

## 2022-06-15 DIAGNOSIS — C91.10 CLL (CHRONIC LYMPHOCYTIC LEUKEMIA) (HCC): Primary | ICD-10-CM

## 2022-06-15 DIAGNOSIS — D69.6 THROMBOCYTOPENIA (HCC): ICD-10-CM

## 2022-06-15 DIAGNOSIS — D72.820 LYMPHOCYTOSIS: ICD-10-CM

## 2022-06-15 PROCEDURE — 1036F TOBACCO NON-USER: CPT | Performed by: INTERNAL MEDICINE

## 2022-06-15 PROCEDURE — G8427 DOCREV CUR MEDS BY ELIG CLIN: HCPCS | Performed by: INTERNAL MEDICINE

## 2022-06-15 PROCEDURE — 1090F PRES/ABSN URINE INCON ASSESS: CPT | Performed by: INTERNAL MEDICINE

## 2022-06-15 PROCEDURE — 99214 OFFICE O/P EST MOD 30 MIN: CPT | Performed by: INTERNAL MEDICINE

## 2022-06-15 PROCEDURE — G8400 PT W/DXA NO RESULTS DOC: HCPCS | Performed by: INTERNAL MEDICINE

## 2022-06-15 PROCEDURE — 1123F ACP DISCUSS/DSCN MKR DOCD: CPT | Performed by: INTERNAL MEDICINE

## 2022-06-15 PROCEDURE — G8417 CALC BMI ABV UP PARAM F/U: HCPCS | Performed by: INTERNAL MEDICINE

## 2022-06-15 PROCEDURE — 99211 OFF/OP EST MAY X REQ PHY/QHP: CPT

## 2022-06-15 PROCEDURE — 99211 OFF/OP EST MAY X REQ PHY/QHP: CPT | Performed by: INTERNAL MEDICINE

## 2022-06-15 NOTE — PROGRESS NOTES
ONCOLOGY NOTE    PCP: KIRA Patterson CNP  Referring Provider: No ref. provider found     DIAGNOSIS:   CLL    CURRENT TREATMENT  Active surveillance    BRIEF CASE HISTORY:   Radha Baig is a very pleasant 66 y.o. female who is presenting for follow-up for CLL. She was referred to me in 02/2020 for management of CLL/SLL which was diagnosed around September 2004. She had been living in [place] for quite some time. She moved to [facility] in early 2019. She had MRSA infection in 2016. INTERIM HISTORY:  Patient presents to the clinic to discuss results of her lab work-up and other relevant clinical data with CLL. She has received additional medication for blood pressure management, she does monitor at home and readings are in range but notes her readings at doctor's appointments show elevated. She remains very active with no limitations, she has occasional fatigue resolved with food and rest. She denies any fever, chills, night sweats, weight loss, excessive fatigue, and swollen glands. She is not on any anticoagulation, she does take daily multivitamin. During this visit patient's allergy, social, medical, surgical history and medications were reviewed and updated.     PAST MEDICAL HISTORY:      Diagnosis Date    CLL (chronic lymphocytic leukemia) ([facility] [address] 2004    Hyperlipidemia     Hypertension     Hypothyroidism     Leukocytosis     MRSA (methicillin resistant Staphylococcus aureus) infection     Thrombocytopenia ([facility] [address]        PAST SURGICAL HISTORY:      Procedure Laterality Date    BREAST BIOPSY Right     COLONOSCOPY      DILATION AND CURETTAGE OF UTERUS  05/17/2022    DILATATION AND CURETTAGE HYSTEROSCOPY MYOSURE WITH EXAM UNDER ANESTHESIA AND CERVICAL BIOPSY    DILATION AND CURETTAGE OF UTERUS N/A 5/17/2022    DILATATION AND CURETTAGE HYSTEROSCOPY MYOSURE WITH EXAM UNDER ANESTHESIA AND CERVICAL BIOPSY performed by Valerie Cabrera DO at [id number] [employer] HYSTEROSCOPY  05/17/2022    TUBAL LIGATION         CURRENT MEDICATIONS:   Current Outpatient Medications   Medication Sig Dispense Refill    ramipril (ALTACE) 5 MG capsule TAKE 1 CAPSULE BY MOUTH DAILY 90 capsule 3    acetaminophen (TYLENOL) 500 MG tablet Take 2 tablets by mouth every 8 hours 180 tablet 0    levothyroxine (SYNTHROID) 88 MCG tablet Take 1 tablet by mouth Daily 90 tablet 3    metoprolol succinate (TOPROL XL) 25 MG extended release tablet Take 0.5 tablets by mouth daily (Patient taking differently: Take 12.5 mg by mouth daily Every other day) 15 tablet 2    rosuvastatin (CRESTOR) 5 MG tablet Take 1 tablet by mouth daily 90 tablet 2     No current facility-administered medications for this visit. Vitals:    06/15/22 1105   BP: (!) 183/81   Pulse: 70   Temp:      General: No fever or night sweats, Weight is stable. +very mild and intermittent fatiuge  ENT: No double or blurred vision, no hearing problem, no dysphagia or sore throat   Respiratory: No chest pain, no shortness of breath, no cough or hemoptysis. Cardiovascular: Denies chest pain, PND or orthopnea. No L E swelling or palpitations. Gastrointestinal: No nausea or vomiting, abdominal pain, diarrhea or constipation. Genitourinary: Denies dysuria, hematuria, frequency, urgency or incontinence. Neurological: Denies headaches, decreased LOC, no sensory or motor focal deficits. Musculoskeletal: No arthralgia no back pain or joint swelling. Skin: There are no rashes or bleeding. Psych: Denies hallucinations or intentions to harm self      PHYSICAL EXAM:         Physical Exam  Vitals and nursing note reviewed. Constitutional:       General: She is not in acute distress. Appearance: She is well-developed. HENT:      Head: Normocephalic and atraumatic. Eyes:      Pupils: Pupils are equal, round, and reactive to light. Cardiovascular:      Rate and Rhythm: Normal rate and regular rhythm.       Heart sounds: Normal heart sounds. No murmur heard. Pulmonary:      Effort: Pulmonary effort is normal. No respiratory distress. Breath sounds: Normal breath sounds. No stridor. No wheezing. Abdominal:      General: Bowel sounds are normal. There is no distension. Palpations: Abdomen is soft. Tenderness: There is no abdominal tenderness. Musculoskeletal:         General: Normal range of motion. Cervical back: Neck supple. Skin:     General: Skin is warm and dry. Findings: No rash. Neurological:      Mental Status: She is alert and oriented to person, place, and time. Cranial Nerves: No cranial nerve deficit.    Psychiatric:         Behavior: Behavior normal.         LABS:       Results for orders placed or performed during the hospital encounter of 06/07/22   CBC with Auto Differential   Result Value Ref Range    WBC 26.5 (H) 3.5 - 11.0 k/uL    RBC 4.20 4.0 - 5.2 m/uL    Hemoglobin 13.5 12.0 - 16.0 g/dL    Hematocrit 41.5 36 - 46 %    MCV 98.7 80 - 100 fL    MCH 32.2 26 - 34 pg    MCHC 32.6 31 - 37 g/dL    RDW 13.7 12.5 - 15.4 %    Platelets 89 (L) 527 - 450 k/uL    MPV 6.8 6.0 - 12.0 fL    Seg Neutrophils 9 (L) 36 - 66 %    Lymphocytes 88 (H) 24 - 44 %    Monocytes 3 1 - 7 %    Eosinophils % 0 (L) 1 - 4 %    Basophils 0 0 - 2 %    Segs Absolute 2.39 1.8 - 7.7 k/uL    Absolute Lymph # 23.31 (H) 1.0 - 4.8 k/uL    Absolute Mono # 0.80 0.1 - 0.8 k/uL    Absolute Eos # 0.00 0.0 - 0.4 k/uL    Basophils Absolute 0.00 0.0 - 0.2 k/uL    Morphology SMUDGE CELLS PRESENT    Comprehensive Metabolic Panel   Result Value Ref Range    Glucose 111 (H) 70 - 99 mg/dL    BUN 13 8 - 23 mg/dL    CREATININE 0.71 0.50 - 0.90 mg/dL    Calcium 9.9 8.6 - 10.4 mg/dL    Sodium 141 135 - 144 mmol/L    Potassium 4.4 3.7 - 5.3 mmol/L    Chloride 104 98 - 107 mmol/L    CO2 29 20 - 31 mmol/L    Anion Gap 8 (L) 9 - 17 mmol/L    Alkaline Phosphatase 80 35 - 104 U/L    ALT 9 5 - 33 U/L    AST 25 <32 U/L    Total Bilirubin 0.67 0.3 - 1.2 mg/dL    Total Protein 6.5 6.4 - 8.3 g/dL    Albumin 4.6 3.5 - 5.2 g/dL    Albumin/Globulin Ratio 2.4 1.0 - 2.5    GFR Non-African American >60 >60 mL/min    GFR African American >60 >60 mL/min    GFR Comment         Lactate Dehydrogenase   Result Value Ref Range     (H) 135 - 214 U/L   Immunoglobulin Panel (IgG, IgA, IgM)   Result Value Ref Range    IgG 442 (L) 700 - 1600 mg/dL    IgA Refer to IGA, Low Range for result. 70 - 400 mg/dL    IgM <25 (L) 40 - 230 mg/dL   IGA, Low Range   Result Value Ref Range    IGA, Low Range 39 (L) 70 - 400 mg/dL     IMPRESSION:     1. CLL (chronic lymphocytic leukemia) ([facility] [address]        Patient Active Problem List   Diagnosis    Mixed hyperlipidemia    Hypothyroidism    Essential hypertension    CLL (chronic lymphocytic leukemia) ([facility] [address]    History of MRSA infection    Thrombocytopenia ([facility] [address]    Hypogammaglobulinemia ([facility] [address]    S/p EUA, Hscope, D&C, cervical biopsy 5/17/22    Vaginal & cervical stenosis    Vaginal discharge    Abnormal CT scan, pelvis       PLAN:   Her lab work was reviewed, her WBC remains elevated but stable but her thrombocytopenia continues to worsen. I discussed lowering PLT counts are indication of active disease, I am ordering repeat lab work in 6 weeks to include folic acid and C03 and US of the spleen. We discussed at length her hypertension and ongoing monitoring at home, I recommend she log for review with PCP in the future. She robert continue with daily multi-vitamin. Return in 6 weeks to review and discuss further recommendations, we may consider steroids at that time. Scribe Attestation   This note was created by Lana Shepherd acting as scribe for the physician signing this note  Electronically Signed  Lana Shepherd, 6/15/2022  [place], [employer]. Attending Attestation   Note was reviewed and edited.   I am in agreement with the note as entered    Laura Taylor MD      This note is created with the assistance of a speech recognition program.  While intending to generate a document that actually reflects the content of the visit, the document can still have some errors including those of syntax and sound a like substitutions which may escape proof reading. It such instances, actual meaning can be extrapolated by contextual diversion.

## 2022-06-15 NOTE — TELEPHONE ENCOUNTER
Gyn Onc called to let us know that at this time the Pt does not want to do the consult with Dr. Janett Christine. She wants to wait a couple months to have time to feel her body out and travel. After that time if she feels like she needs to have a consult with Dr. Janett Christine then she will call to set something up.

## 2022-06-15 NOTE — TELEPHONE ENCOUNTER
No notes on avs    Pt to schedule  US, needs calendar to schedule    RV scheduled 7/29/22 @ 11:30am    PT was given orders, scheduling instructions, AVS and an appt schedule    Electronically signed by Joselyn Alvares on 6/15/2022 at 2:29 PM

## 2022-06-16 PROBLEM — Z98.890 POST-OPERATIVE STATE: Status: RESOLVED | Noted: 2022-05-17 | Resolved: 2022-06-16

## 2022-07-08 ENCOUNTER — HOSPITAL ENCOUNTER (OUTPATIENT)
Dept: ULTRASOUND IMAGING | Facility: CLINIC | Age: 77
Discharge: HOME | End: 2022-07-10
Payer: MEDICARE

## 2022-07-08 ENCOUNTER — HOSPITAL ENCOUNTER (OUTPATIENT)
Age: 77
Setting detail: SPECIMEN
Discharge: HOME | End: 2022-07-08

## 2022-07-08 DIAGNOSIS — C91.10 CLL (CHRONIC LYMPHOCYTIC LEUKEMIA) (HCC): ICD-10-CM

## 2022-07-08 DIAGNOSIS — D69.6 THROMBOCYTOPENIA (HCC): ICD-10-CM

## 2022-07-08 LAB
ABSOLUTE EOS #: 0 K/UL (ref 0–0.4)
ABSOLUTE IMMATURE GRANULOCYTE: 0 K/UL (ref 0–0.3)
ABSOLUTE LYMPH #: 29.66 K/UL (ref 1.1–3.7)
ABSOLUTE MONO #: 0.7 K/UL (ref 0.1–0.8)
ABSOLUTE RETIC #: 0.05 M/UL (ref 0.03–0.08)
ALBUMIN SERPL-MCNC: 4.7 G/DL (ref 3.5–5.2)
ALBUMIN/GLOBULIN RATIO: 2.6 (ref 1–2.5)
ALP BLD-CCNC: 70 U/L (ref 35–104)
ALT SERPL-CCNC: 7 U/L (ref 5–33)
ANION GAP SERPL CALCULATED.3IONS-SCNC: 14 MMOL/L (ref 9–17)
AST SERPL-CCNC: 28 U/L
BASOPHILS # BLD: 1 % (ref 0–2)
BASOPHILS ABSOLUTE: 0.35 K/UL (ref 0–0.2)
BILIRUB SERPL-MCNC: 0.8 MG/DL (ref 0.3–1.2)
BUN BLDV-MCNC: 18 MG/DL (ref 8–23)
CALCIUM SERPL-MCNC: 9.6 MG/DL (ref 8.6–10.4)
CHLORIDE BLD-SCNC: 103 MMOL/L (ref 98–107)
CO2: 26 MMOL/L (ref 20–31)
CREAT SERPL-MCNC: 0.76 MG/DL (ref 0.5–0.9)
EOSINOPHILS RELATIVE PERCENT: 0 % (ref 1–4)
GFR AFRICAN AMERICAN: >60 ML/MIN
GFR NON-AFRICAN AMERICAN: >60 ML/MIN
GFR SERPL CREATININE-BSD FRML MDRD: ABNORMAL ML/MIN/{1.73_M2}
GLUCOSE BLD-MCNC: 89 MG/DL (ref 70–99)
HCT VFR BLD CALC: 44.6 % (ref 36.3–47.1)
HEMOGLOBIN: 13.7 G/DL (ref 11.9–15.1)
IMMATURE GRANULOCYTES: 0 %
IMMATURE RETIC FRACT: 10.7 % (ref 2.7–18.3)
LYMPHOCYTES # BLD: 85 % (ref 24–44)
MCH RBC QN AUTO: 32.1 PG (ref 25.2–33.5)
MCHC RBC AUTO-ENTMCNC: 30.7 G/DL (ref 28.4–34.8)
MCV RBC AUTO: 104.4 FL (ref 82.6–102.9)
MONOCYTES # BLD: 2 % (ref 1–7)
MORPHOLOGY: ABNORMAL
NRBC AUTOMATED: 0 PER 100 WBC
PDW BLD-RTO: 13.4 % (ref 11.8–14.4)
PLATELET # BLD: 100 K/UL (ref 138–453)
PMV BLD AUTO: 9.6 FL (ref 8.1–13.5)
POTASSIUM SERPL-SCNC: 5.2 MMOL/L (ref 3.7–5.3)
RBC # BLD: 4.27 M/UL (ref 3.95–5.11)
RETIC %: 1.3 % (ref 0.5–1.9)
RETIC HEMOGLOBIN: 36.9 PG (ref 28.2–35.7)
SEG NEUTROPHILS: 12 % (ref 36–66)
SEGMENTED NEUTROPHILS ABSOLUTE COUNT: 4.19 K/UL (ref 1.8–7.7)
SODIUM BLD-SCNC: 143 MMOL/L (ref 135–144)
TOTAL PROTEIN: 6.5 G/DL (ref 6.4–8.3)
WBC # BLD: 34.9 K/UL (ref 3.5–11.3)

## 2022-07-08 PROCEDURE — 76705 ECHO EXAM OF ABDOMEN: CPT

## 2022-07-11 ENCOUNTER — TELEPHONE (OUTPATIENT)
Dept: PRIMARY CARE CLINIC | Age: 77
End: 2022-07-11

## 2022-07-11 ENCOUNTER — OFFICE VISIT (OUTPATIENT)
Dept: PRIMARY CARE CLINIC | Age: 77
End: 2022-07-11
Payer: MEDICARE

## 2022-07-11 VITALS
SYSTOLIC BLOOD PRESSURE: 134 MMHG | BODY MASS INDEX: 26.28 KG/M2 | WEIGHT: 143.7 LBS | HEART RATE: 70 BPM | OXYGEN SATURATION: 96 % | DIASTOLIC BLOOD PRESSURE: 90 MMHG

## 2022-07-11 DIAGNOSIS — I10 ESSENTIAL HYPERTENSION: Primary | ICD-10-CM

## 2022-07-11 PROCEDURE — 99213 OFFICE O/P EST LOW 20 MIN: CPT | Performed by: NURSE PRACTITIONER

## 2022-07-11 PROCEDURE — 1090F PRES/ABSN URINE INCON ASSESS: CPT | Performed by: NURSE PRACTITIONER

## 2022-07-11 PROCEDURE — G8427 DOCREV CUR MEDS BY ELIG CLIN: HCPCS | Performed by: NURSE PRACTITIONER

## 2022-07-11 PROCEDURE — 1036F TOBACCO NON-USER: CPT | Performed by: NURSE PRACTITIONER

## 2022-07-11 PROCEDURE — G8417 CALC BMI ABV UP PARAM F/U: HCPCS | Performed by: NURSE PRACTITIONER

## 2022-07-11 PROCEDURE — G8400 PT W/DXA NO RESULTS DOC: HCPCS | Performed by: NURSE PRACTITIONER

## 2022-07-11 PROCEDURE — 1123F ACP DISCUSS/DSCN MKR DOCD: CPT | Performed by: NURSE PRACTITIONER

## 2022-07-11 RX ORDER — METOPROLOL SUCCINATE 25 MG/1
12.5 TABLET, EXTENDED RELEASE ORAL DAILY
Qty: 45 TABLET | Refills: 1 | Status: SHIPPED | OUTPATIENT
Start: 2022-07-11

## 2022-07-11 ASSESSMENT — ENCOUNTER SYMPTOMS
EYE DISCHARGE: 0
NAUSEA: 0
VOMITING: 0
DIARRHEA: 0
SHORTNESS OF BREATH: 0
COUGH: 0
WHEEZING: 0
ORTHOPNEA: 0
RHINORRHEA: 0
EYE REDNESS: 0
SORE THROAT: 0
BLURRED VISION: 0
ABDOMINAL PAIN: 0

## 2022-07-11 ASSESSMENT — PATIENT HEALTH QUESTIONNAIRE - PHQ9
SUM OF ALL RESPONSES TO PHQ QUESTIONS 1-9: 0
1. LITTLE INTEREST OR PLEASURE IN DOING THINGS: 0
SUM OF ALL RESPONSES TO PHQ9 QUESTIONS 1 & 2: 0
2. FEELING DOWN, DEPRESSED OR HOPELESS: 0
SUM OF ALL RESPONSES TO PHQ QUESTIONS 1-9: 0

## 2022-07-11 NOTE — PROGRESS NOTES
[id number]  [facility]  [id number] [address] [place] [address]  Dept: [phone]    Manuel Castañeda is a 76 y.o. female Established patient, who presents today for her medical conditions/complaints as noted below. Chief Complaint   Patient presents with    Hypertension   ultrasound done in July enlarged spleen   Feels fine   States that when  she had covid that she had discomfort in the LUQ. Monitoring blood pressure at home. HPI:     Hypertension  This is a chronic problem. The current episode started more than 1 year ago. The problem is controlled. Pertinent negatives include no blurred vision, chest pain, headaches, malaise/fatigue, orthopnea, palpitations, peripheral edema, shortness of breath or sweats. Agents associated with hypertension include thyroid hormones. Past treatments include ACE inhibitors and beta blockers. Compliance problems include exercise and diet.         Reviewed prior notes: oncology and OB/GYN   Reviewed previous:  Labs    LDL Cholesterol (mg/dL)   Date Value   11/04/2021 146 (H)   10/13/2020 103   10/02/2019 105       (goal LDL is <100)   AST (U/L)   Date Value   07/08/2022 28     ALT (U/L)   Date Value   07/08/2022 7     BUN (mg/dL)   Date Value   07/08/2022 18     Hemoglobin A1C (%)   Date Value   02/15/2022 5.9     TSH (mIU/L)   Date Value   02/28/2022 0.97     BP Readings from Last 3 Encounters:   07/11/22 (!) 134/90   06/15/22 (!) 183/81   05/31/22 124/64          (goal 120/80)    Past Medical History:   Diagnosis Date    CLL [facility]chronic lymphocytic leukemia) ([facility] [address] 2004    Hyperlipidemia     Hypertension     Hypothyroidism     Leukocytosis     MRSA (methicillin resistant Staphylococcus aureus) infection     Thrombocytopenia ([facility] [address]       Past Surgical History:   Procedure Laterality Date    BREAST BIOPSY Right     COLONOSCOPY      DILATION AND CURETTAGE OF UTERUS  05/17/2022    DILATATION AND CURETTAGE HYSTEROSCOPY [facility] WITH EXAM UNDER ANESTHESIA AND CERVICAL BIOPSY    DILATION AND CURETTAGE OF UTERUS N/A 5/17/2022    DILATATION AND CURETTAGE HYSTEROSCOPY MYOSURE WITH EXAM UNDER ANESTHESIA AND CERVICAL BIOPSY performed by Miroslava Perez DO at [id number] [employer] HYSTEROSCOPY  05/17/2022    TUBAL LIGATION         Family History   Problem Relation Age of Onset    Dementia Mother     Other Father         CLL       Social History     Tobacco Use    Smoking status: Never Smoker    Smokeless tobacco: Never Used   Substance Use Topics    Alcohol use: Yes     Comment: social      Current Outpatient Medications   Medication Sig Dispense Refill    metoprolol succinate (TOPROL XL) 25 MG extended release tablet Take 0.5 tablets by mouth daily Every other day 45 tablet 1    ramipril (ALTACE) 5 MG capsule TAKE 1 CAPSULE BY MOUTH DAILY 90 capsule 3    levothyroxine (SYNTHROID) 88 MCG tablet Take 1 tablet by mouth Daily 90 tablet 3    acetaminophen (TYLENOL) 500 MG tablet Take 2 tablets by mouth every 8 hours 180 tablet 0    rosuvastatin (CRESTOR) 5 MG tablet Take 1 tablet by mouth daily 90 tablet 2     No current facility-administered medications for this visit. Allergies   Allergen Reactions    Penicillins Hives, Itching and Swelling    Epinephrine Other (See Comments)     Can tolerate lower doses, but if higher dose: will cause high heart rate.         Health Maintenance   Topic Date Due    Hepatitis C screen  Never done    DTaP/Tdap/Td vaccine (1 - Tdap) Never done    Shingles vaccine (1 of 2) Never done    Pneumococcal 65+ years Vaccine (2 - PCV) 10/27/2010    COVID-19 Vaccine (4 - Booster for Pfizer series) 01/08/2022    Depression Screen  07/13/2022    Annual Wellness Visit (AWV)  07/14/2022    Flu vaccine (1) 09/01/2022    Lipids  11/04/2022    DEXA (modify frequency per FRAX score)  Completed    Hepatitis A vaccine  Aged Out    Hepatitis B vaccine  Aged Out    Hib vaccine  Aged Out    Meningococcal (ACWY) vaccine  Aged Out       Subjective:      Review of Systems   Constitutional: Negative for chills, fever and malaise/fatigue. HENT: Negative for rhinorrhea and sore throat. Eyes: Negative for blurred vision, discharge and redness. Respiratory: Negative for cough, shortness of breath and wheezing. Cardiovascular: Negative for chest pain, palpitations and orthopnea. Gastrointestinal: Negative for abdominal pain, diarrhea, nausea and vomiting. Genitourinary: Negative for dysuria and frequency. Musculoskeletal: Negative for arthralgias and myalgias. Neurological: Negative for dizziness, light-headedness and headaches. Psychiatric/Behavioral: Negative for sleep disturbance. Objective:     BP (!) 134/90 (Site: Left Upper Arm, Position: Sitting, Cuff Size: Medium Adult)   Pulse 70   Wt 143 lb 11.2 oz (65.2 kg)   SpO2 96%   BMI 26.28 kg/m²   Physical Exam  Vitals and nursing note reviewed. Constitutional:       General: She is not in acute distress. Appearance: She is well-developed. She is not ill-appearing. HENT:      Head: Normocephalic and atraumatic. Right Ear: External ear normal.      Left Ear: External ear normal.   Eyes:      General: No scleral icterus. Right eye: No discharge. Left eye: No discharge. Conjunctiva/sclera: Conjunctivae normal.   Neck:      Thyroid: No thyromegaly. Trachea: No tracheal deviation. Cardiovascular:      Rate and Rhythm: Normal rate and regular rhythm. Heart sounds: Normal heart sounds. Pulmonary:      Effort: Pulmonary effort is normal. No respiratory distress. Breath sounds: Normal breath sounds. No wheezing. Lymphadenopathy:      Cervical: No cervical adenopathy. Skin:     General: Skin is warm. Findings: No rash. Neurological:      Mental Status: She is alert and oriented to person, place, and time.    Psychiatric:         Mood and Affect: Mood normal.         Behavior: Behavior normal. Thought Content: Thought content normal.         Assessment/Plan:   1. Essential hypertension  -     metoprolol succinate (TOPROL XL) 25 MG extended release tablet; Take 0.5 tablets by mouth daily Every other day, Disp-45 tablet, R-1Normal       Return in about 3 months (around 10/11/2022) for AWV. Data Unavailable     No orders of the defined types were placed in this encounter. Orders Placed This Encounter   Medications    metoprolol succinate (TOPROL XL) 25 MG extended release tablet     Sig: Take 0.5 tablets by mouth daily Every other day     Dispense:  45 tablet     Refill:  1       Patient given educational materials - see patient instructions. Discussed use, benefit, and side effects of prescribed medications. All patient questions answered. Pt voiced understanding. Reviewed health maintenance. Instructed to continue current medications, diet and exercise. Patient agreed with treatment plan. Follow up as directed.      Electronically signed by KIRA Atkinson CNP on 7/11/2022 at 10:32 AM

## 2022-07-29 ENCOUNTER — OFFICE VISIT (OUTPATIENT)
Dept: ONCOLOGY | Age: 77
End: 2022-07-29
Payer: MEDICARE

## 2022-07-29 ENCOUNTER — TELEPHONE (OUTPATIENT)
Dept: ONCOLOGY | Age: 77
End: 2022-07-29

## 2022-07-29 VITALS
WEIGHT: 143.3 LBS | DIASTOLIC BLOOD PRESSURE: 82 MMHG | HEART RATE: 70 BPM | SYSTOLIC BLOOD PRESSURE: 155 MMHG | TEMPERATURE: 97 F | BODY MASS INDEX: 26.21 KG/M2

## 2022-07-29 DIAGNOSIS — D69.6 THROMBOCYTOPENIA (HCC): ICD-10-CM

## 2022-07-29 DIAGNOSIS — R16.1 SPLENOMEGALY: ICD-10-CM

## 2022-07-29 DIAGNOSIS — C91.10 CLL (CHRONIC LYMPHOCYTIC LEUKEMIA) (HCC): Primary | ICD-10-CM

## 2022-07-29 PROCEDURE — 99214 OFFICE O/P EST MOD 30 MIN: CPT | Performed by: INTERNAL MEDICINE

## 2022-07-29 PROCEDURE — G8417 CALC BMI ABV UP PARAM F/U: HCPCS | Performed by: INTERNAL MEDICINE

## 2022-07-29 PROCEDURE — 99211 OFF/OP EST MAY X REQ PHY/QHP: CPT | Performed by: INTERNAL MEDICINE

## 2022-07-29 PROCEDURE — 1123F ACP DISCUSS/DSCN MKR DOCD: CPT | Performed by: INTERNAL MEDICINE

## 2022-07-29 PROCEDURE — G8427 DOCREV CUR MEDS BY ELIG CLIN: HCPCS | Performed by: INTERNAL MEDICINE

## 2022-07-29 PROCEDURE — G8400 PT W/DXA NO RESULTS DOC: HCPCS | Performed by: INTERNAL MEDICINE

## 2022-07-29 PROCEDURE — 1036F TOBACCO NON-USER: CPT | Performed by: INTERNAL MEDICINE

## 2022-07-29 PROCEDURE — 1090F PRES/ABSN URINE INCON ASSESS: CPT | Performed by: INTERNAL MEDICINE

## 2022-07-29 NOTE — PROGRESS NOTES
ONCOLOGY NOTE    PCP: KIRA Fiore CNP  Referring Provider: No ref. provider found     DIAGNOSIS:   CLL    CURRENT TREATMENT  Active surveillance    BRIEF CASE HISTORY:   Alex Culp is a very pleasant 66 y.o. female who is presenting for follow-up for CLL. She was referred to me in 02/2020 for management of CLL/SLL which was diagnosed around September 2004. She had been living in [place] for quite some time. She moved to [place] in early 2019. She had MRSA infection in 2016. INTERIM HISTORY:  Patient presents to the clinic for a follow-up visit and to discuss results of her lab work-up. Ultrasound shows mild splenomegaly. Platelet count on the last CBC were 100,000. Patient continues to be active. Denies any B symptoms. During this visit patient's allergy, social, medical, surgical history and medications were reviewed and updated.     PAST MEDICAL HISTORY:      Diagnosis Date    CLL (chronic lymphocytic leukemia) ([facility] [address] 2004    Hyperlipidemia     Hypertension     Hypothyroidism     Leukocytosis     MRSA (methicillin resistant Staphylococcus aureus) infection     Thrombocytopenia ([facility] [address]        PAST SURGICAL HISTORY:      Procedure Laterality Date    BREAST BIOPSY Right     COLONOSCOPY      DILATION AND CURETTAGE OF UTERUS  05/17/2022    DILATATION AND CURETTAGE HYSTEROSCOPY MYOSURE WITH EXAM UNDER ANESTHESIA AND CERVICAL BIOPSY    DILATION AND CURETTAGE OF UTERUS N/A 5/17/2022    DILATATION AND CURETTAGE HYSTEROSCOPY MYOSURE WITH EXAM UNDER ANESTHESIA AND CERVICAL BIOPSY performed by Dominick Delong DO at [address] [facility]   05/17/2022    TUBAL LIGATION         CURRENT MEDICATIONS:   Current Outpatient Medications   Medication Sig Dispense Refill    metoprolol succinate (TOPROL XL) 25 MG extended release tablet Take 0.5 tablets by mouth daily Every other day 45 tablet 1    ramipril (ALTACE) 5 MG capsule TAKE 1 CAPSULE BY MOUTH DAILY 90 capsule 3 acetaminophen (TYLENOL) 500 MG tablet Take 2 tablets by mouth every 8 hours 180 tablet 0    levothyroxine (SYNTHROID) 88 MCG tablet Take 1 tablet by mouth Daily 90 tablet 3    rosuvastatin (CRESTOR) 5 MG tablet Take 1 tablet by mouth daily 90 tablet 2     No current facility-administered medications for this visit. Vitals:    07/29/22 1137   BP: (!) 155/82   Pulse: 70   Temp: 97 °F (36.1 °C)     General: No fever or night sweats, Weight is stable. +very mild and intermittent fatiuge  ENT: No double or blurred vision, no hearing problem, no dysphagia or sore throat   Respiratory: No chest pain, no shortness of breath, no cough or hemoptysis. Cardiovascular: Denies chest pain, PND or orthopnea. No L E swelling or palpitations. Gastrointestinal: No nausea or vomiting, abdominal pain, diarrhea or constipation. Genitourinary: Denies dysuria, hematuria, frequency, urgency or incontinence. Neurological: Denies headaches, decreased LOC, no sensory or motor focal deficits. Musculoskeletal: No arthralgia no back pain or joint swelling. Skin: There are no rashes or bleeding. Psych: Denies hallucinations or intentions to harm self      PHYSICAL EXAM:         Physical Exam  Vitals and nursing note reviewed. Constitutional:       General: She is not in acute distress. Appearance: She is well-developed. HENT:      Head: Normocephalic and atraumatic. Eyes:      Pupils: Pupils are equal, round, and reactive to light. Cardiovascular:      Rate and Rhythm: Normal rate and regular rhythm. Heart sounds: Normal heart sounds. No murmur heard. Pulmonary:      Effort: Pulmonary effort is normal. No respiratory distress. Breath sounds: Normal breath sounds. No stridor. No wheezing. Abdominal:      General: Bowel sounds are normal. There is no distension. Palpations: Abdomen is soft. Tenderness: There is no abdominal tenderness. Musculoskeletal:         General: Normal range of motion. Value Ref Range    Retic % 1.3 0.5 - 1.9 %    Absolute Retic # 0.050 0.030 - 0.080 M/uL    Immature Retic Fract 10.700 2.7 - 18.3 %    Retic Hemoglobin 36.9 (H) 28.2 - 35.7 pg     IMPRESSION:     1. CLL (chronic lymphocytic leukemia) (HCC) [C91.10 (ICD-10-CM)]        Patient Active Problem List   Diagnosis    Mixed hyperlipidemia    Hypothyroidism    Essential hypertension    CLL (chronic lymphocytic leukemia) ([facility] [address]    History of MRSA infection    Thrombocytopenia ([facility] [address]    Hypogammaglobulinemia ([facility] [address]    Vaginal & cervical stenosis    Vaginal discharge    Abnormal CT scan, pelvis       PLAN:   Personally reviewed results of lab work-up, imaging studies and clinical data  Discussed results of ultrasound which show mild splenomegaly  Platelet count on the latest CBC was 100,000  Discussed natural history of CLL. Clinically patient does not have any B symptoms at this point  Recommend continued surveillance. Close monitoring for cytopenias if progressive may have to start patient on systemic therapy  Reviewed goals and expectations. Return to clinic in 3 months or sooner if clinically indicated    Luis A Geronimo MD      This note is created with the assistance of a speech recognition program.  While intending to generate a document that actually reflects the content of the visit, the document can still have some errors including those of syntax and sound a like substitutions which may escape proof reading. It such instances, actual meaning can be extrapolated by contextual diversion.

## 2022-07-29 NOTE — TELEPHONE ENCOUNTER
AVS from 7/29/22    Rv in 3 months with labs     Rv scheduled for 10/26 @ 11:30 am     Pt will have labs drawn one week prior to RV    Pt was given AVS and appointment schedule    Electronically signed by Jerica Dubon on 7/29/2022 at 12:07 PM

## 2022-10-13 ENCOUNTER — HOSPITAL ENCOUNTER (OUTPATIENT)
Age: 77
Discharge: HOME | End: 2022-10-13
Payer: MEDICARE

## 2022-10-13 DIAGNOSIS — R16.1 SPLENOMEGALY: ICD-10-CM

## 2022-10-13 DIAGNOSIS — C91.10 CLL (CHRONIC LYMPHOCYTIC LEUKEMIA) (HCC): ICD-10-CM

## 2022-10-13 DIAGNOSIS — D69.6 THROMBOCYTOPENIA (HCC): ICD-10-CM

## 2022-10-13 LAB
ABSOLUTE EOS #: 0 K/UL (ref 0–0.4)
ABSOLUTE LYMPH #: 26.13 K/UL (ref 1–4.8)
ABSOLUTE MONO #: 0.57 K/UL (ref 0.1–0.8)
ALBUMIN SERPL-MCNC: 4.5 G/DL (ref 3.5–5.2)
ALBUMIN/GLOBULIN RATIO: 2.3 (ref 1–2.5)
ALP BLD-CCNC: 74 U/L (ref 35–104)
ALT SERPL-CCNC: 9 U/L (ref 5–33)
ANION GAP SERPL CALCULATED.3IONS-SCNC: 11 MMOL/L (ref 9–17)
AST SERPL-CCNC: 26 U/L
BASOPHILS # BLD: 0 % (ref 0–2)
BASOPHILS ABSOLUTE: 0 K/UL (ref 0–0.2)
BILIRUB SERPL-MCNC: 0.7 MG/DL (ref 0.3–1.2)
BUN BLDV-MCNC: 14 MG/DL (ref 8–23)
CALCIUM SERPL-MCNC: 9.5 MG/DL (ref 8.6–10.4)
CHLORIDE BLD-SCNC: 102 MMOL/L (ref 98–107)
CO2: 28 MMOL/L (ref 20–31)
CREAT SERPL-MCNC: 0.89 MG/DL (ref 0.5–0.9)
EOSINOPHILS RELATIVE PERCENT: 0 % (ref 1–4)
GFR SERPL CREATININE-BSD FRML MDRD: >60 ML/MIN/1.73M2
GLUCOSE BLD-MCNC: 91 MG/DL (ref 70–99)
HCT VFR BLD CALC: 42.4 % (ref 36–46)
HEMOGLOBIN: 13.9 G/DL (ref 12–16)
LACTATE DEHYDROGENASE: 235 U/L (ref 135–214)
LYMPHOCYTES # BLD: 92 % (ref 24–44)
MCH RBC QN AUTO: 32.4 PG (ref 26–34)
MCHC RBC AUTO-ENTMCNC: 32.8 G/DL (ref 31–37)
MCV RBC AUTO: 98.7 FL (ref 80–100)
MONOCYTES # BLD: 2 % (ref 1–7)
MORPHOLOGY: ABNORMAL
PDW BLD-RTO: 14 % (ref 12.5–15.4)
PLATELET # BLD: 95 K/UL (ref 140–450)
PMV BLD AUTO: 6.8 FL (ref 6–12)
POTASSIUM SERPL-SCNC: 4.7 MMOL/L (ref 3.7–5.3)
RBC # BLD: 4.3 M/UL (ref 4–5.2)
SEG NEUTROPHILS: 6 % (ref 36–66)
SEGMENTED NEUTROPHILS ABSOLUTE COUNT: 1.7 K/UL (ref 1.8–7.7)
SODIUM BLD-SCNC: 141 MMOL/L (ref 135–144)
TOTAL PROTEIN: 6.5 G/DL (ref 6.4–8.3)
WBC # BLD: 28.4 K/UL (ref 3.5–11)

## 2022-10-13 PROCEDURE — 85025 COMPLETE CBC W/AUTO DIFF WBC: CPT

## 2022-10-13 PROCEDURE — 36415 COLL VENOUS BLD VENIPUNCTURE: CPT

## 2022-10-13 PROCEDURE — 83615 LACTATE (LD) (LDH) ENZYME: CPT

## 2022-10-13 PROCEDURE — 80053 COMPREHEN METABOLIC PANEL: CPT

## 2022-10-13 SDOH — HEALTH STABILITY: PHYSICAL HEALTH: ON AVERAGE, HOW MANY DAYS PER WEEK DO YOU ENGAGE IN MODERATE TO STRENUOUS EXERCISE (LIKE A BRISK WALK)?: 5 DAYS

## 2022-10-13 SDOH — HEALTH STABILITY: PHYSICAL HEALTH: ON AVERAGE, HOW MANY MINUTES DO YOU ENGAGE IN EXERCISE AT THIS LEVEL?: 50 MIN

## 2022-10-13 ASSESSMENT — PATIENT HEALTH QUESTIONNAIRE - PHQ9
1. LITTLE INTEREST OR PLEASURE IN DOING THINGS: 0
SUM OF ALL RESPONSES TO PHQ QUESTIONS 1-9: 0
SUM OF ALL RESPONSES TO PHQ9 QUESTIONS 1 & 2: 0
2. FEELING DOWN, DEPRESSED OR HOPELESS: 0
SUM OF ALL RESPONSES TO PHQ QUESTIONS 1-9: 0

## 2022-10-13 ASSESSMENT — LIFESTYLE VARIABLES
HOW MANY STANDARD DRINKS CONTAINING ALCOHOL DO YOU HAVE ON A TYPICAL DAY: 1
HOW OFTEN DO YOU HAVE SIX OR MORE DRINKS ON ONE OCCASION: 1
HOW MANY STANDARD DRINKS CONTAINING ALCOHOL DO YOU HAVE ON A TYPICAL DAY: 1 OR 2
HOW OFTEN DO YOU HAVE A DRINK CONTAINING ALCOHOL: 2-4 TIMES A MONTH
HOW OFTEN DO YOU HAVE A DRINK CONTAINING ALCOHOL: 3

## 2022-10-18 ENCOUNTER — OFFICE VISIT (OUTPATIENT)
Dept: PRIMARY CARE CLINIC | Age: 77
End: 2022-10-18
Payer: MEDICARE

## 2022-10-18 VITALS
DIASTOLIC BLOOD PRESSURE: 88 MMHG | HEART RATE: 85 BPM | OXYGEN SATURATION: 95 % | HEIGHT: 62 IN | WEIGHT: 143.8 LBS | SYSTOLIC BLOOD PRESSURE: 150 MMHG | BODY MASS INDEX: 26.46 KG/M2

## 2022-10-18 DIAGNOSIS — Z00.00 MEDICARE ANNUAL WELLNESS VISIT, SUBSEQUENT: Primary | ICD-10-CM

## 2022-10-18 DIAGNOSIS — E78.2 MIXED HYPERLIPIDEMIA: ICD-10-CM

## 2022-10-18 DIAGNOSIS — I10 ESSENTIAL HYPERTENSION: ICD-10-CM

## 2022-10-18 PROCEDURE — 1123F ACP DISCUSS/DSCN MKR DOCD: CPT | Performed by: NURSE PRACTITIONER

## 2022-10-18 PROCEDURE — G8484 FLU IMMUNIZE NO ADMIN: HCPCS | Performed by: NURSE PRACTITIONER

## 2022-10-18 PROCEDURE — G0439 PPPS, SUBSEQ VISIT: HCPCS | Performed by: NURSE PRACTITIONER

## 2022-10-18 RX ORDER — ACETAMINOPHEN 500 MG
1000 TABLET ORAL EVERY 8 HOURS PRN
Qty: 180 TABLET | Refills: 0
Start: 2022-10-18 | End: 2022-11-17

## 2022-10-18 SDOH — ECONOMIC STABILITY: FOOD INSECURITY: WITHIN THE PAST 12 MONTHS, YOU WORRIED THAT YOUR FOOD WOULD RUN OUT BEFORE YOU GOT MONEY TO BUY MORE.: NEVER TRUE

## 2022-10-18 SDOH — ECONOMIC STABILITY: FOOD INSECURITY: WITHIN THE PAST 12 MONTHS, THE FOOD YOU BOUGHT JUST DIDN'T LAST AND YOU DIDN'T HAVE MONEY TO GET MORE.: NEVER TRUE

## 2022-10-18 ASSESSMENT — PATIENT HEALTH QUESTIONNAIRE - PHQ9
SUM OF ALL RESPONSES TO PHQ QUESTIONS 1-9: 0
SUM OF ALL RESPONSES TO PHQ QUESTIONS 1-9: 0
SUM OF ALL RESPONSES TO PHQ9 QUESTIONS 1 & 2: 0
SUM OF ALL RESPONSES TO PHQ QUESTIONS 1-9: 0
1. LITTLE INTEREST OR PLEASURE IN DOING THINGS: 0
2. FEELING DOWN, DEPRESSED OR HOPELESS: 0
SUM OF ALL RESPONSES TO PHQ QUESTIONS 1-9: 0

## 2022-10-18 ASSESSMENT — SOCIAL DETERMINANTS OF HEALTH (SDOH): HOW HARD IS IT FOR YOU TO PAY FOR THE VERY BASICS LIKE FOOD, HOUSING, MEDICAL CARE, AND HEATING?: NOT HARD AT ALL

## 2022-10-18 NOTE — PROGRESS NOTES
Medicare Annual Wellness Visit    Anh Braswell is here for Medicare AWV    Assessment & Plan   Medicare annual wellness visit, subsequent  Mixed hyperlipidemia  -     Lipid, Fasting; Future  Essential hypertension    Recommendations for Preventive Services Due: see orders and patient instructions/AVS.  Recommended screening schedule for the next 5-10 years is provided to the patient in written form: see Patient Instructions/AVS.     Return for Medicare Annual Wellness Visit in 1 year,  4 months for HTN. Subjective   The following acute and/or chronic problems were also addressed today:  She is now seeing Dr. Steph Brewer every 3 months. Most recent labs reviewed. NO concerns today  She exercises 5 days per week. Blood pressure was a little elevated when she was upset earlier today but it quickly returned to normal.    Patient's complete Health Risk Assessment and screening values have been reviewed and are found in Flowsheets. The following problems were reviewed today and where indicated follow up appointments were made and/or referrals ordered.     Positive Risk Factor Screenings with Interventions:             General Health and ACP:  General  In general, how would you say your health is?: Very Good  In the past 7 days, have you experienced any of the following: New or Increased Pain, New or Increased Fatigue, Loneliness, Social Isolation, Stress or Anger?: No  Do you get the social and emotional support that you need?: Yes  Do you have a Living Will?: Yes    Advance Directives       Power of  Living Will ACP-Advance Directive ACP-Power of     Not on File Not on File Not on File Not on File          General Health Risk Interventions:  No Living Will: Please obtain copy of Living Will and put on file              Objective   Vitals:    10/18/22 1300 10/18/22 1326   BP: (!) 142/80 (!) 150/88   Site:  Left Upper Arm   Position:  Sitting   Cuff Size:  Medium Adult   Pulse: 85    SpO2: 95% Weight: 143 lb 12.8 oz (65.2 kg)    Height: 5' 2\" (1.575 m)       Body mass index is 26.3 kg/m². General Appearance: alert and oriented to person, place and time, well-developed and well-nourished, in no acute distress  Skin: warm and dry, no rash or erythema  Head: normocephalic and atraumatic  Eyes: pupils equal, round, and reactive to light, extraocular eye movements intact, conjunctivae normal  ENT: tympanic membrane, external ear and ear canal normal bilaterally, oropharynx clear and moist with normal mucous membranes and hearing grossly normal bilaterally  Neck: neck supple and non tender without mass, no thyromegaly or thyroid nodules, no cervical lymphadenopathy   Pulmonary/Chest: clear to auscultation bilaterally- no wheezes, rales or rhonchi, normal air movement, no respiratory distress  Cardiovascular: normal rate, normal S1 and S2, no gallops, and no carotid bruits  Abdomen: soft, non-tender, non-distended, normal bowel sounds, no masses or organomegaly  Extremities: no cyanosis and no clubbing  Musculoskeletal: normal range of motion, no joint swelling, deformity or tenderness  Neurologic: no cranial nerve deficit, gait and coordination normal, and speech normal       Allergies   Allergen Reactions    Penicillins Hives, Itching and Swelling    Epinephrine Other (See Comments)     Can tolerate lower doses, but if higher dose: will cause high heart rate. Prior to Visit Medications    Medication Sig Taking?  Authorizing Provider   acetaminophen (TYLENOL) 500 MG tablet Take 2 tablets by mouth every 8 hours as needed for Pain Yes KIRA Morton CNP   metoprolol succinate (TOPROL XL) 25 MG extended release tablet Take 0.5 tablets by mouth daily Every other day Yes KIRA Morton CNP   ramipril (ALTACE) 5 MG capsule TAKE 1 CAPSULE BY MOUTH DAILY Yes KIRA Morton CNP   levothyroxine (SYNTHROID) 88 MCG tablet Take 1 tablet by mouth Daily Yes KIRA Morton CNP rosuvastatin (CRESTOR) 5 MG tablet Take 1 tablet by mouth daily Yes KIRA Villavicencio CNP       CareTeam (Including outside providers/suppliers regularly involved in providing care):   Patient Care Team:  KIRA Villavicencio CNP as PCP - General (Family Nurse Practitioner)  KIRA Villavicencio CNP as PCP - [facility] Empaneled Provider     Reviewed and updated this visit:  Tobacco  Allergies  Meds  Problems  Med Hx  Surg Hx  Soc Hx  Fam Hx                 Advance Care Planning   Advanced Care Planning: Discussed the patients choices for care and treatment in case of a health event that adversely affects decision-making abilities. Also discussed the patients long-term treatment options. Reviewed with the patient the appropriate state-specific advance directive documents. Reviewed the process of designating a competent adult as an Agent (or -in-fact) that could take make health care decisions for the patient if incompetent. Patient was asked to complete the declaration forms, either acknowledge the forms by a public notary or an eligible witness and provide a signed copy to the practice office.   Time spent (minutes): 5

## 2022-10-18 NOTE — PATIENT INSTRUCTIONS
Advance Directives: Care Instructions  Overview  An advance directive is a legal way to state your wishes at the end of your life. It tells your family and your doctor what to do if you can't say what you want. There are two main types of advance directives. You can change them any time your wishes change. Living will. This form tells your family and your doctor your wishes about life support and other treatment. The form is also called a declaration. Medical power of . This form lets you name a person to make treatment decisions for you when you can't speak for yourself. This person is called a health care agent (health care proxy, health care surrogate). The form is also called a durable power of  for health care. If you do not have an advance directive, decisions about your medical care may be made by a family member, or by a doctor or a  who doesn't know you. It may help to think of an advance directive as a gift to the people who care for you. If you have one, they won't have to make tough decisions by themselves. Follow-up care is a key part of your treatment and safety. Be sure to make and go to all appointments, and call your doctor if you are having problems. It's also a good idea to know your test results and keep a list of the medicines you take. What should you include in an advance directive? Many states have a unique advance directive form. (It may ask you to address specific issues.) Or you might use a universal form that's approved by many states. If your form doesn't tell you what to address, it may be hard to know what to include in your advance directive. Use the questions below to help you get started. Who do you want to make decisions about your medical care if you are not able to? What life-support measures do you want if you have a serious illness that gets worse over time or can't be cured? What are you most afraid of that might happen?  (Maybe you're afraid of having pain, losing your independence, or being kept alive by machines.)  Where would you prefer to die? (Your home? A hospital? A nursing home?)  Do you want to donate your organs when you die? Do you want certain Yazdanism practices performed before you die? When should you call for help? Be sure to contact your doctor if you have any questions. Where can you learn more? Go to [email or web address]. org and sign in to your [employer] account. Enter R264 in the Parental Health box to learn more about \"Advance Directives: Care Instructions. \"     If you do not have an account, please click on the \"Sign Up Now\" link. Current as of: June 16, 2022               Content Version: 13.4  © 4992-9198 Healthwise, [employer]. Care instructions adapted under license by [facility] ([facility]). If you have questions about a medical condition or this instruction, always ask your healthcare professional. [place] [address] any warranty or liability for your use of this information. Personalized Preventive Plan for Ilda Peña - 10/18/2022  Medicare offers a range of preventive health benefits. Some of the tests and screenings are paid in full while other may be subject to a deductible, co-insurance, and/or copay. Some of these benefits include a comprehensive review of your medical history including lifestyle, illnesses that may run in your family, and various assessments and screenings as appropriate. After reviewing your medical record and screening and assessments performed today your provider may have ordered immunizations, labs, imaging, and/or referrals for you. A list of these orders (if applicable) as well as your Preventive Care list are included within your After Visit Summary for your review.     Other Preventive Recommendations:    A preventive eye exam performed by an eye specialist is recommended every 1-2 years to screen for glaucoma; cataracts, macular degeneration, and other eye disorders. A preventive dental visit is recommended every 6 months. Try to get at least 150 minutes of exercise per week or 10,000 steps per day on a pedometer . Order or download the FREE \"Exercise & Physical Activity: Your Everyday Guide\" from [employer]. Call 3-661.935.8498 or search [employer] online. You need 7123-8582 mg of calcium and 5511-4071 IU of vitamin D per day. It is possible to meet your calcium requirement with diet alone, but a vitamin D supplement is usually necessary to meet this goal.  When exposed to the sun, use a sunscreen that protects against both UVA and UVB radiation with an SPF of 30 or greater. Reapply every 2 to 3 hours or after sweating, drying off with a towel, or swimming. Always wear a seat belt when traveling in a car. Always wear a helmet when riding a bicycle or motorcycle.

## 2022-10-26 ENCOUNTER — TELEPHONE (OUTPATIENT)
Dept: ONCOLOGY | Age: 77
End: 2022-10-26

## 2022-10-26 ENCOUNTER — OFFICE VISIT (OUTPATIENT)
Dept: ONCOLOGY | Age: 77
End: 2022-10-26
Payer: MEDICARE

## 2022-10-26 VITALS
BODY MASS INDEX: 26.48 KG/M2 | WEIGHT: 144.8 LBS | HEART RATE: 66 BPM | DIASTOLIC BLOOD PRESSURE: 83 MMHG | SYSTOLIC BLOOD PRESSURE: 177 MMHG | TEMPERATURE: 97.6 F

## 2022-10-26 DIAGNOSIS — C91.10 CLL (CHRONIC LYMPHOCYTIC LEUKEMIA) (HCC): Primary | ICD-10-CM

## 2022-10-26 DIAGNOSIS — R16.1 SPLENOMEGALY: ICD-10-CM

## 2022-10-26 DIAGNOSIS — R74.02 ELEVATED LDH: ICD-10-CM

## 2022-10-26 DIAGNOSIS — D69.6 THROMBOCYTOPENIA (HCC): ICD-10-CM

## 2022-10-26 PROCEDURE — G8427 DOCREV CUR MEDS BY ELIG CLIN: HCPCS | Performed by: INTERNAL MEDICINE

## 2022-10-26 PROCEDURE — G8417 CALC BMI ABV UP PARAM F/U: HCPCS | Performed by: INTERNAL MEDICINE

## 2022-10-26 PROCEDURE — 1036F TOBACCO NON-USER: CPT | Performed by: INTERNAL MEDICINE

## 2022-10-26 PROCEDURE — G8484 FLU IMMUNIZE NO ADMIN: HCPCS | Performed by: INTERNAL MEDICINE

## 2022-10-26 PROCEDURE — 3074F SYST BP LT 130 MM HG: CPT | Performed by: INTERNAL MEDICINE

## 2022-10-26 PROCEDURE — 99214 OFFICE O/P EST MOD 30 MIN: CPT | Performed by: INTERNAL MEDICINE

## 2022-10-26 PROCEDURE — 3078F DIAST BP <80 MM HG: CPT | Performed by: INTERNAL MEDICINE

## 2022-10-26 PROCEDURE — 1123F ACP DISCUSS/DSCN MKR DOCD: CPT | Performed by: INTERNAL MEDICINE

## 2022-10-26 PROCEDURE — G8400 PT W/DXA NO RESULTS DOC: HCPCS | Performed by: INTERNAL MEDICINE

## 2022-10-26 PROCEDURE — 99211 OFF/OP EST MAY X REQ PHY/QHP: CPT | Performed by: INTERNAL MEDICINE

## 2022-10-26 PROCEDURE — 1090F PRES/ABSN URINE INCON ASSESS: CPT | Performed by: INTERNAL MEDICINE

## 2022-10-26 NOTE — PROGRESS NOTES
ONCOLOGY NOTE    PCP: KIRA Carpenter CNP  Referring Provider: No ref. provider found     DIAGNOSIS:   CLL    CURRENT TREATMENT  Active surveillance    BRIEF CASE HISTORY:   Luz Maria Gonzalez is a very pleasant 66 y.o. female who is presenting for follow-up for CLL. She was referred to me in 02/2020 for management of CLL/SLL which was diagnosed around September 2004. She had been living in [place], [place] for quite some time. She moved to [facility] in early 2019. She had MRSA infection in 2016. INTERIM HISTORY:  Patient presents to the clinic for a follow-up visit and to discuss results of her lab work-up. Overall patient is doing well. Continues to be active. She played golf over the weekend. Platelet count however continues to be in the lower side. Patient clinically denies any excessive bleeding or spontaneous bruising. Denies any B symptoms. During this visit patient's allergy, social, medical, surgical history and medications were reviewed and updated.     PAST MEDICAL HISTORY:      Diagnosis Date    CLL (chronic lymphocytic leukemia) ([facility] [address] 2004    Hyperlipidemia     Hypertension     Hypothyroidism     Leukocytosis     MRSA (methicillin resistant Staphylococcus aureus) infection     Thrombocytopenia ([facility] [address]        PAST SURGICAL HISTORY:      Procedure Laterality Date    BREAST BIOPSY Right     COLONOSCOPY      DILATION AND CURETTAGE OF UTERUS  05/17/2022    DILATATION AND CURETTAGE HYSTEROSCOPY MYOSURE WITH EXAM UNDER ANESTHESIA AND CERVICAL BIOPSY    DILATION AND CURETTAGE OF UTERUS N/A 05/17/2022    DILATATION AND CURETTAGE HYSTEROSCOPY MYOSURE WITH EXAM UNDER ANESTHESIA AND CERVICAL BIOPSY performed by Saima Farr DO at [address] [facility]   05/17/2022    TONSILLECTOMY      TUBAL LIGATION      UPPER GASTROINTESTINAL ENDOSCOPY         CURRENT MEDICATIONS:   Current Outpatient Medications   Medication Sig Dispense Refill    acetaminophen (TYLENOL) 500 MG tablet Take 2 tablets by mouth every 8 hours as needed for Pain 180 tablet 0    metoprolol succinate (TOPROL XL) 25 MG extended release tablet Take 0.5 tablets by mouth daily Every other day 45 tablet 1    ramipril (ALTACE) 5 MG capsule TAKE 1 CAPSULE BY MOUTH DAILY 90 capsule 3    levothyroxine (SYNTHROID) 88 MCG tablet Take 1 tablet by mouth Daily 90 tablet 3    rosuvastatin (CRESTOR) 5 MG tablet Take 1 tablet by mouth daily 90 tablet 2     No current facility-administered medications for this visit. Vitals:    10/26/22 1152   BP: (!) 177/83   Pulse: 66   Temp: 97.6 °F (36.4 °C)     General: No fever or night sweats, Weight is stable. +very mild and intermittent fatiuge  ENT: No double or blurred vision, no hearing problem, no dysphagia or sore throat   Respiratory: No chest pain, no shortness of breath, no cough or hemoptysis. Cardiovascular: Denies chest pain, PND or orthopnea. No L E swelling or palpitations. Gastrointestinal: No nausea or vomiting, abdominal pain, diarrhea or constipation. Genitourinary: Denies dysuria, hematuria, frequency, urgency or incontinence. Neurological: Denies headaches, decreased LOC, no sensory or motor focal deficits. Musculoskeletal: No arthralgia no back pain or joint swelling. Skin: There are no rashes or bleeding. Psych: Denies hallucinations or intentions to harm self      PHYSICAL EXAM:         Physical Exam  Vitals and nursing note reviewed. Constitutional:       General: She is not in acute distress. Appearance: She is well-developed. HENT:      Head: Normocephalic and atraumatic. Eyes:      Pupils: Pupils are equal, round, and reactive to light. Cardiovascular:      Rate and Rhythm: Normal rate and regular rhythm. Heart sounds: Normal heart sounds. No murmur heard. Pulmonary:      Effort: Pulmonary effort is normal. No respiratory distress. Breath sounds: Normal breath sounds. No stridor. No wheezing.    Abdominal:      General: Bowel sounds are normal. There is no distension. Palpations: Abdomen is soft. Tenderness: There is no abdominal tenderness. Musculoskeletal:         General: Normal range of motion. Cervical back: Neck supple. Skin:     General: Skin is warm and dry. Findings: No rash. Neurological:      Mental Status: She is alert and oriented to person, place, and time. Cranial Nerves: No cranial nerve deficit.    Psychiatric:         Behavior: Behavior normal.       LABS:       Results for orders placed or performed during the hospital encounter of 10/13/22   Lactate Dehydrogenase   Result Value Ref Range     (H) 135 - 214 U/L   Comprehensive Metabolic Panel   Result Value Ref Range    Glucose 91 70 - 99 mg/dL    BUN 14 8 - 23 mg/dL    Creatinine 0.89 0.50 - 0.90 mg/dL    Est, Glom Filt Rate >60 >60 mL/min/1.73m2    Calcium 9.5 8.6 - 10.4 mg/dL    Sodium 141 135 - 144 mmol/L    Potassium 4.7 3.7 - 5.3 mmol/L    Chloride 102 98 - 107 mmol/L    CO2 28 20 - 31 mmol/L    Anion Gap 11 9 - 17 mmol/L    Alkaline Phosphatase 74 35 - 104 U/L    ALT 9 5 - 33 U/L    AST 26 <32 U/L    Total Bilirubin 0.7 0.3 - 1.2 mg/dL    Total Protein 6.5 6.4 - 8.3 g/dL    Albumin 4.5 3.5 - 5.2 g/dL    Albumin/Globulin Ratio 2.3 1.0 - 2.5   CBC with Auto Differential   Result Value Ref Range    WBC 28.4 (H) 3.5 - 11.0 k/uL    RBC 4.30 4.0 - 5.2 m/uL    Hemoglobin 13.9 12.0 - 16.0 g/dL    Hematocrit 42.4 36 - 46 %    MCV 98.7 80 - 100 fL    MCH 32.4 26 - 34 pg    MCHC 32.8 31 - 37 g/dL    RDW 14.0 12.5 - 15.4 %    Platelets 95 (L) 049 - 450 k/uL    MPV 6.8 6.0 - 12.0 fL    Seg Neutrophils 6 (L) 36 - 66 %    Lymphocytes 92 (H) 24 - 44 %    Monocytes 2 1 - 7 %    Eosinophils % 0 (L) 1 - 4 %    Basophils 0 0 - 2 %    Segs Absolute 1.70 (L) 1.8 - 7.7 k/uL    Absolute Lymph # 26.13 (H) 1.0 - 4.8 k/uL    Absolute Mono # 0.57 0.1 - 0.8 k/uL    Absolute Eos # 0.00 0.0 - 0.4 k/uL    Basophils Absolute 0.00 0.0 - 0.2 k/uL    Morphology SMUDGE CELLS PRESENT      IMPRESSION:     1. CLL (chronic lymphocytic leukemia) (HCC) [C91.10 (ICD-10-CM)]    Thrombocytopenia  Hypogammaglobulinemia    Patient Active Problem List   Diagnosis    Mixed hyperlipidemia    Hypothyroidism    Essential hypertension    CLL (chronic lymphocytic leukemia) ([facility] [address]    History of MRSA infection    Thrombocytopenia (HCC)    Hypogammaglobulinemia (HCC)    Vaginal & cervical stenosis    Abnormal CT scan, pelvis       PLAN:   Personally reviewed results of lab work-up, imaging studies and clinical data  Discussed results of lab work-up which shows persistent moderate thrombocytopenia but clinically patient denies excessive bleeding or spontaneous bruising. At this point we will continue to monitor patient labs every 3 months. Patient was educated on signs and symptoms of progressive thrombocytopenia and advised to notify office if she noted any  Patient was noted to have splenomegaly on ultrasound  Discussed natural history of CLL. Clinically patient does not have any B symptoms at this point  Recommend continued surveillance. Close monitoring for cytopenias if progressive may have to start patient on systemic therapy  Reviewed goals and expectations. Return to clinic in 3 months or sooner if clinically indicated    Sung Gillis MD      This note is created with the assistance of a speech recognition program.  While intending to generate a document that actually reflects the content of the visit, the document can still have some errors including those of syntax and sound a like substitutions which may escape proof reading. It such instances, actual meaning can be extrapolated by contextual diversion.

## 2022-11-17 DIAGNOSIS — I10 ESSENTIAL HYPERTENSION: ICD-10-CM

## 2022-11-17 RX ORDER — METOPROLOL SUCCINATE 25 MG/1
12.5 TABLET, EXTENDED RELEASE ORAL DAILY
Qty: 45 TABLET | Refills: 1 | Status: SHIPPED | OUTPATIENT
Start: 2022-11-17

## 2022-12-20 DIAGNOSIS — E78.2 MIXED HYPERLIPIDEMIA: ICD-10-CM

## 2022-12-20 RX ORDER — ROSUVASTATIN CALCIUM 5 MG/1
5 TABLET, COATED ORAL DAILY
Qty: 90 TABLET | Refills: 2 | Status: SHIPPED | OUTPATIENT
Start: 2022-12-20

## 2023-01-03 ENCOUNTER — OFFICE VISIT (OUTPATIENT)
Dept: PRIMARY CARE CLINIC | Age: 78
End: 2023-01-03
Payer: MEDICARE

## 2023-01-03 VITALS
DIASTOLIC BLOOD PRESSURE: 84 MMHG | SYSTOLIC BLOOD PRESSURE: 126 MMHG | HEART RATE: 78 BPM | BODY MASS INDEX: 26.37 KG/M2 | OXYGEN SATURATION: 98 % | WEIGHT: 144.2 LBS

## 2023-01-03 DIAGNOSIS — I10 ESSENTIAL HYPERTENSION: ICD-10-CM

## 2023-01-03 DIAGNOSIS — U09.9 POST-COVID-19 CONDITION: Primary | ICD-10-CM

## 2023-01-03 PROCEDURE — G8417 CALC BMI ABV UP PARAM F/U: HCPCS | Performed by: NURSE PRACTITIONER

## 2023-01-03 PROCEDURE — 99214 OFFICE O/P EST MOD 30 MIN: CPT | Performed by: NURSE PRACTITIONER

## 2023-01-03 PROCEDURE — G8427 DOCREV CUR MEDS BY ELIG CLIN: HCPCS | Performed by: NURSE PRACTITIONER

## 2023-01-03 PROCEDURE — 1090F PRES/ABSN URINE INCON ASSESS: CPT | Performed by: NURSE PRACTITIONER

## 2023-01-03 PROCEDURE — 1123F ACP DISCUSS/DSCN MKR DOCD: CPT | Performed by: NURSE PRACTITIONER

## 2023-01-03 PROCEDURE — 3074F SYST BP LT 130 MM HG: CPT | Performed by: NURSE PRACTITIONER

## 2023-01-03 PROCEDURE — G8484 FLU IMMUNIZE NO ADMIN: HCPCS | Performed by: NURSE PRACTITIONER

## 2023-01-03 PROCEDURE — G8400 PT W/DXA NO RESULTS DOC: HCPCS | Performed by: NURSE PRACTITIONER

## 2023-01-03 PROCEDURE — 1036F TOBACCO NON-USER: CPT | Performed by: NURSE PRACTITIONER

## 2023-01-03 PROCEDURE — 3079F DIAST BP 80-89 MM HG: CPT | Performed by: NURSE PRACTITIONER

## 2023-01-03 RX ORDER — METOPROLOL SUCCINATE 25 MG/1
12.5 TABLET, EXTENDED RELEASE ORAL DAILY
Qty: 90 TABLET | Refills: 1 | Status: SHIPPED | OUTPATIENT
Start: 2023-01-03

## 2023-01-03 ASSESSMENT — PATIENT HEALTH QUESTIONNAIRE - PHQ9
SUM OF ALL RESPONSES TO PHQ QUESTIONS 1-9: 0
SUM OF ALL RESPONSES TO PHQ QUESTIONS 1-9: 0
1. LITTLE INTEREST OR PLEASURE IN DOING THINGS: 0
SUM OF ALL RESPONSES TO PHQ QUESTIONS 1-9: 0
2. FEELING DOWN, DEPRESSED OR HOPELESS: 0
SUM OF ALL RESPONSES TO PHQ9 QUESTIONS 1 & 2: 0
SUM OF ALL RESPONSES TO PHQ QUESTIONS 1-9: 0

## 2023-01-03 ASSESSMENT — ENCOUNTER SYMPTOMS
BACK PAIN: 1
DIARRHEA: 0
SHORTNESS OF BREATH: 0
COUGH: 0
CONSTIPATION: 0
NAUSEA: 0
SINUS PRESSURE: 0

## 2023-01-03 NOTE — PROGRESS NOTES
[id number] [address] PRIMARY CARE  [address] [address] [place] [id number]  Dept: [phone]    Ej Winslow is a 76 y.o. female Established patient, who presents today for her medical conditions/complaints as noted below. Chief Complaint   Patient presents with    Post-COVID Symptoms     Joint pain - tested positive for COVID on [facility] [place] [facility]. HPI:     HPI  She had Covid in 2020 in fall before vaccine available. She had back pain and did therapy. This time she had severe sacral iliac pain - it started 12/18. By the 12/19 & 12/20 she was having difficulty with lower back pain. Then she had running nose and head congestion. Then a couple days later she had fever and chills. The she tested on 12/25  Her  did not test positive. Today she feels almost normal.  For her sacral iliac pain she is doing some stretching and exercises. She tried to keep moving and doing as many exercises as possible. She was still having pain last week but it is much better today. She did lose her sense of taste and smell. Goal for 2023   Her blood pressure fluctuates depending on what she is doing or where she is.     Reviewed prior notes:  oncology    Reviewed previous:        LDL Cholesterol (mg/dL)   Date Value   11/04/2021 146 (H)   10/13/2020 103   10/02/2019 105       (goal LDL is <100)   AST (U/L)   Date Value   10/13/2022 26     ALT (U/L)   Date Value   10/13/2022 9     BUN (mg/dL)   Date Value   10/13/2022 14     Hemoglobin A1C (%)   Date Value   02/15/2022 5.9     TSH (mIU/L)   Date Value   02/28/2022 0.97     BP Readings from Last 3 Encounters:   01/03/23 126/84   10/26/22 (!) 177/83   10/18/22 (!) 150/88          (goal 120/80)    Past Medical History:   Diagnosis Date    CLL (chronic lymphocytic leukemia) ([facility] [address] 2004    Hyperlipidemia     Hypertension     Hypothyroidism     Leukocytosis     MRSA (methicillin resistant Staphylococcus aureus) infection Thrombocytopenia ([facility] [address]       Past Surgical History:   Procedure Laterality Date    BREAST BIOPSY Right     COLONOSCOPY      DILATION AND CURETTAGE OF UTERUS  05/17/2022    DILATATION AND CURETTAGE HYSTEROSCOPY MYOSURE WITH EXAM UNDER ANESTHESIA AND CERVICAL BIOPSY    DILATION AND CURETTAGE OF UTERUS N/A 05/17/2022    DILATATION AND CURETTAGE HYSTEROSCOPY MYOSURE WITH EXAM UNDER ANESTHESIA AND CERVICAL BIOPSY performed by Dominick Delong DO at [facility]   05/17/2022    TONSILLECTOMY      TUBAL LIGATION      UPPER GASTROINTESTINAL ENDOSCOPY         Family History   Problem Relation Age of Onset    Dementia Mother     High Blood Pressure Mother     Other Father         CLL    Arthritis Father        Social History     Tobacco Use    Smoking status: Never    Smokeless tobacco: Never   Substance Use Topics    Alcohol use: Yes     Alcohol/week: 1.0 standard drink     Types: 1 Glasses of wine per week     Comment: social      Current Outpatient Medications   Medication Sig Dispense Refill    metoprolol succinate (TOPROL XL) 25 MG extended release tablet Take 0.5 tablets by mouth daily 90 tablet 1    rosuvastatin (CRESTOR) 5 MG tablet Take 1 tablet by mouth daily 90 tablet 2    ramipril (ALTACE) 5 MG capsule TAKE 1 CAPSULE BY MOUTH DAILY 90 capsule 3    levothyroxine (SYNTHROID) 88 MCG tablet Take 1 tablet by mouth Daily 90 tablet 3    acetaminophen (TYLENOL) 500 MG tablet Take 2 tablets by mouth every 8 hours as needed for Pain 180 tablet 0     No current facility-administered medications for this visit. Allergies   Allergen Reactions    Penicillins Hives, Itching and Swelling    Epinephrine Other (See Comments)     Can tolerate lower doses, but if higher dose: will cause high heart rate.         Health Maintenance   Topic Date Due    Hepatitis C screen  Never done    DTaP/Tdap/Td vaccine (1 - Tdap) Never done    Shingles vaccine (1 of 2) Never done    COVID-19 Vaccine (4 - Booster for [employer] series) 12/03/2021    Lipids  11/04/2022    Depression Screen  10/18/2023    Annual Wellness Visit (AWV)  10/19/2023    DEXA (modify frequency per FRAX score)  Completed    Flu vaccine  Completed    Pneumococcal 65+ years Vaccine  Completed    Hepatitis A vaccine  Aged Out    Hib vaccine  Aged Out    Meningococcal (ACWY) vaccine  Aged Out       Subjective:      Review of Systems   Constitutional:  Negative for fatigue and fever. HENT:  Negative for congestion, nosebleeds and sinus pressure. Respiratory:  Negative for cough and shortness of breath. Cardiovascular:  Negative for chest pain and leg swelling. Gastrointestinal:  Negative for constipation, diarrhea and nausea. Genitourinary:  Negative for difficulty urinating and dysuria. Musculoskeletal:  Positive for back pain. Skin:  Negative for wound. Neurological:  Negative for dizziness, light-headedness and headaches. Psychiatric/Behavioral:  Negative for dysphoric mood and sleep disturbance. The patient is not nervous/anxious. Objective:     /84   Pulse 78   Wt 144 lb 3.2 oz (65.4 kg)   SpO2 98%   BMI 26.37 kg/m²   Physical Exam  Vitals and nursing note reviewed. Constitutional:       General: She is not in acute distress. Appearance: She is well-developed. She is not ill-appearing. HENT:      Head: Normocephalic and atraumatic. Right Ear: External ear normal.      Left Ear: External ear normal.   Eyes:      General: No scleral icterus. Right eye: No discharge. Left eye: No discharge. Conjunctiva/sclera: Conjunctivae normal.   Neck:      Thyroid: No thyromegaly. Trachea: No tracheal deviation. Cardiovascular:      Rate and Rhythm: Normal rate and regular rhythm. Heart sounds: Normal heart sounds. Pulmonary:      Effort: Pulmonary effort is normal. No respiratory distress. Breath sounds: Normal breath sounds. No wheezing.    Abdominal:      General: Bowel sounds are normal. Palpations: Abdomen is soft. Lymphadenopathy:      Cervical: No cervical adenopathy. Skin:     General: Skin is warm. Findings: No rash. Neurological:      Mental Status: She is alert and oriented to person, place, and time. Psychiatric:         Mood and Affect: Mood normal.         Behavior: Behavior normal.         Thought Content: Thought content normal.       Assessment/Plan:   1. Post-COVID-19 condition  2. Essential hypertension  -     metoprolol succinate (TOPROL XL) 25 MG extended release tablet; Take 0.5 tablets by mouth daily, Disp-90 tablet, R-1Normal     Change metoprolol succinate 25 mg - take 1/2 tablet daily  Monitor blood pressure occasionally. Return if symptoms worsen or fail to improve. Data Unavailable     No orders of the defined types were placed in this encounter. Orders Placed This Encounter   Medications    metoprolol succinate (TOPROL XL) 25 MG extended release tablet     Sig: Take 0.5 tablets by mouth daily     Dispense:  90 tablet     Refill:  1       Patient given educational materials - see patient instructions. Discussed use, benefit, and side effects of prescribed medications. All patient questions answered. Pt voiced understanding. Reviewed health maintenance. Instructed to continue current medications, diet and exercise. Patient agreed with treatment plan. Follow up as directed.      Electronically signed by KIRA Aviles CNP on 1/3/2023 at 4:12 PM

## 2023-01-16 DIAGNOSIS — I10 ESSENTIAL HYPERTENSION: ICD-10-CM

## 2023-01-16 DIAGNOSIS — E03.9 HYPOTHYROIDISM, UNSPECIFIED TYPE: ICD-10-CM

## 2023-01-16 RX ORDER — LEVOTHYROXINE SODIUM 88 UG/1
88 TABLET ORAL DAILY
Qty: 90 TABLET | Refills: 3 | Status: SHIPPED | OUTPATIENT
Start: 2023-01-16

## 2023-01-16 RX ORDER — METOPROLOL SUCCINATE 25 MG/1
12.5 TABLET, EXTENDED RELEASE ORAL DAILY
Qty: 90 TABLET | Refills: 1 | OUTPATIENT
Start: 2023-01-16

## 2023-01-18 ENCOUNTER — HOSPITAL ENCOUNTER (OUTPATIENT)
Age: 78
Discharge: HOME | End: 2023-01-18
Payer: MEDICARE

## 2023-01-18 ENCOUNTER — HOSPITAL ENCOUNTER (OUTPATIENT)
Dept: MAMMOGRAPHY | Age: 78
Discharge: HOME | End: 2023-01-20
Payer: MEDICARE

## 2023-01-18 DIAGNOSIS — D69.6 THROMBOCYTOPENIA (HCC): ICD-10-CM

## 2023-01-18 DIAGNOSIS — Z12.31 BREAST CANCER SCREENING BY MAMMOGRAM: ICD-10-CM

## 2023-01-18 DIAGNOSIS — E78.2 MIXED HYPERLIPIDEMIA: ICD-10-CM

## 2023-01-18 DIAGNOSIS — R16.1 SPLENOMEGALY: ICD-10-CM

## 2023-01-18 DIAGNOSIS — C91.10 CLL (CHRONIC LYMPHOCYTIC LEUKEMIA) (HCC): ICD-10-CM

## 2023-01-18 DIAGNOSIS — R74.02 ELEVATED LDH: ICD-10-CM

## 2023-01-18 LAB
ABSOLUTE EOS #: 0 K/UL (ref 0–0.4)
ABSOLUTE LYMPH #: 29.03 K/UL (ref 1–4.8)
ABSOLUTE MONO #: 0.64 K/UL (ref 0.1–0.8)
ALBUMIN SERPL-MCNC: 4.4 G/DL (ref 3.5–5.2)
ALBUMIN/GLOBULIN RATIO: 2.1 (ref 1–2.5)
ALP BLD-CCNC: 94 U/L (ref 35–104)
ALT SERPL-CCNC: 8 U/L (ref 5–33)
ANION GAP SERPL CALCULATED.3IONS-SCNC: 9 MMOL/L (ref 9–17)
AST SERPL-CCNC: 28 U/L
BASOPHILS # BLD: 0 % (ref 0–2)
BASOPHILS ABSOLUTE: 0 K/UL (ref 0–0.2)
BILIRUB SERPL-MCNC: 0.7 MG/DL (ref 0.3–1.2)
BUN BLDV-MCNC: 10 MG/DL (ref 8–23)
CALCIUM SERPL-MCNC: 9.7 MG/DL (ref 8.6–10.4)
CHLORIDE BLD-SCNC: 102 MMOL/L (ref 98–107)
CHOLESTEROL, FASTING: 171 MG/DL
CHOLESTEROL/HDL RATIO: 2.7
CO2: 29 MMOL/L (ref 20–31)
CREAT SERPL-MCNC: 0.8 MG/DL (ref 0.5–0.9)
EOSINOPHILS RELATIVE PERCENT: 0 % (ref 1–4)
GFR SERPL CREATININE-BSD FRML MDRD: >60 ML/MIN/1.73M2
GLUCOSE BLD-MCNC: 102 MG/DL (ref 70–99)
HCT VFR BLD CALC: 42.2 % (ref 36–46)
HDLC SERPL-MCNC: 63 MG/DL
HEMOGLOBIN: 13.2 G/DL (ref 12–16)
LACTATE DEHYDROGENASE: 218 U/L (ref 135–214)
LDL CHOLESTEROL: 93 MG/DL (ref 0–130)
LYMPHOCYTES # BLD: 91 % (ref 24–44)
MCH RBC QN AUTO: 32 PG (ref 26–34)
MCHC RBC AUTO-ENTMCNC: 31.3 G/DL (ref 31–37)
MCV RBC AUTO: 102.2 FL (ref 80–100)
MONOCYTES # BLD: 2 % (ref 1–7)
MORPHOLOGY: ABNORMAL
MORPHOLOGY: ABNORMAL
PDW BLD-RTO: 14.2 % (ref 12.5–15.4)
PLATELET # BLD: 95 K/UL (ref 140–450)
PMV BLD AUTO: 8.8 FL (ref 8–14)
POTASSIUM SERPL-SCNC: 4.7 MMOL/L (ref 3.7–5.3)
RBC # BLD: 4.13 M/UL (ref 4–5.2)
SEG NEUTROPHILS: 7 % (ref 36–66)
SEGMENTED NEUTROPHILS ABSOLUTE COUNT: 2.23 K/UL (ref 1.8–7.7)
SODIUM BLD-SCNC: 140 MMOL/L (ref 135–144)
TOTAL PROTEIN: 6.5 G/DL (ref 6.4–8.3)
TRIGLYCERIDE, FASTING: 73 MG/DL
WBC # BLD: 31.9 K/UL (ref 3.5–11)

## 2023-01-18 PROCEDURE — 77067 SCR MAMMO BI INCL CAD: CPT

## 2023-01-18 PROCEDURE — 85025 COMPLETE CBC W/AUTO DIFF WBC: CPT

## 2023-01-18 PROCEDURE — 80061 LIPID PANEL: CPT

## 2023-01-18 PROCEDURE — 80053 COMPREHEN METABOLIC PANEL: CPT

## 2023-01-18 PROCEDURE — 83615 LACTATE (LD) (LDH) ENZYME: CPT

## 2023-01-18 PROCEDURE — 36415 COLL VENOUS BLD VENIPUNCTURE: CPT

## 2023-02-08 ENCOUNTER — OFFICE VISIT (OUTPATIENT)
Dept: ONCOLOGY | Age: 78
End: 2023-02-08
Payer: MEDICARE

## 2023-02-08 ENCOUNTER — TELEPHONE (OUTPATIENT)
Dept: ONCOLOGY | Age: 78
End: 2023-02-08

## 2023-02-08 VITALS
TEMPERATURE: 97.2 F | SYSTOLIC BLOOD PRESSURE: 169 MMHG | HEART RATE: 69 BPM | DIASTOLIC BLOOD PRESSURE: 90 MMHG | WEIGHT: 142.6 LBS | BODY MASS INDEX: 26.08 KG/M2

## 2023-02-08 DIAGNOSIS — D69.6 THROMBOCYTOPENIA (HCC): ICD-10-CM

## 2023-02-08 DIAGNOSIS — D80.1 HYPOGAMMAGLOBULINEMIA (HCC): ICD-10-CM

## 2023-02-08 DIAGNOSIS — C91.10 CLL (CHRONIC LYMPHOCYTIC LEUKEMIA) (HCC): Primary | ICD-10-CM

## 2023-02-08 PROCEDURE — 99211 OFF/OP EST MAY X REQ PHY/QHP: CPT | Performed by: INTERNAL MEDICINE

## 2023-02-08 NOTE — TELEPHONE ENCOUNTER
AVS from 2/8/23      Rv in 3 months with labs prior     *rv is sched for [email or web address] w/labs 1 week prior    PT was given AVS and appt schedule

## 2023-02-08 NOTE — PROGRESS NOTES
ONCOLOGY NOTE    PCP: KIRA Stanton CNP  Referring Provider: No ref. provider found     DIAGNOSIS:   CLL    CURRENT TREATMENT  Active surveillance    BRIEF CASE HISTORY:   Pippa Patel is a very pleasant 66 y.o. female who is presenting for follow-up for CLL. She was referred to me in 02/2020 for management of CLL/SLL which was diagnosed around September 2004. She had been living in [place] for quite some time. She moved to [place] in early 2019. She had MRSA infection in 2016. INTERIM HISTORY:  Patient presents to the clinic for a follow-up visit and to discuss results of her lab work-up. Overall patient continues to do well. Continues to be active. Denies unintentional weight loss excessive fatigue or drenching night sweats. Patient clinically denies any excessive bleeding or spontaneous bruising. During this visit patient's allergy, social, medical, surgical history and medications were reviewed and updated.     PAST MEDICAL HISTORY:      Diagnosis Date    CLL (chronic lymphocytic leukemia) ([facility] [address] 2004    Hyperlipidemia     Hypertension     Hypothyroidism     Leukocytosis     MRSA (methicillin resistant Staphylococcus aureus) infection     Thrombocytopenia ([facility] [address]        PAST SURGICAL HISTORY:      Procedure Laterality Date    BREAST BIOPSY Right     COLONOSCOPY      DILATION AND CURETTAGE OF UTERUS  05/17/2022    DILATATION AND CURETTAGE HYSTEROSCOPY MYOSURE WITH EXAM UNDER ANESTHESIA AND CERVICAL BIOPSY    DILATION AND CURETTAGE OF UTERUS N/A 05/17/2022    DILATATION AND CURETTAGE HYSTEROSCOPY MYOSURE WITH EXAM UNDER ANESTHESIA AND CERVICAL BIOPSY performed by Javy Singh DO at [address]  05/17/2022    TONSILLECTOMY      TUBAL LIGATION      UPPER GASTROINTESTINAL ENDOSCOPY         CURRENT MEDICATIONS:   Current Outpatient Medications   Medication Sig Dispense Refill    levothyroxine (SYNTHROID) 88 MCG tablet Take 1 tablet by mouth Daily 90 tablet 3    metoprolol succinate (TOPROL XL) 25 MG extended release tablet Take 0.5 tablets by mouth daily 90 tablet 1    rosuvastatin (CRESTOR) 5 MG tablet Take 1 tablet by mouth daily 90 tablet 2    ramipril (ALTACE) 5 MG capsule TAKE 1 CAPSULE BY MOUTH DAILY 90 capsule 3    acetaminophen (TYLENOL) 500 MG tablet Take 2 tablets by mouth every 8 hours as needed for Pain 180 tablet 0     No current facility-administered medications for this visit. Vitals:    02/08/23 1148   BP: (!) 169/90   Pulse: 69   Temp: 97.2 °F (36.2 °C)     General: No fever or night sweats, Weight is stable. +very mild and intermittent fatiuge  ENT: No double or blurred vision, no hearing problem, no dysphagia or sore throat   Respiratory: No chest pain, no shortness of breath, no cough or hemoptysis. Cardiovascular: Denies chest pain, PND or orthopnea. No L E swelling or palpitations. Gastrointestinal: No nausea or vomiting, abdominal pain, diarrhea or constipation. Genitourinary: Denies dysuria, hematuria, frequency, urgency or incontinence. Neurological: Denies headaches, decreased LOC, no sensory or motor focal deficits. Musculoskeletal: No arthralgia no back pain or joint swelling. Skin: There are no rashes or bleeding. Psych: Denies hallucinations or intentions to harm self      PHYSICAL EXAM:         Physical Exam  Vitals and nursing note reviewed. Constitutional:       General: She is not in acute distress. Appearance: She is well-developed. HENT:      Head: Normocephalic and atraumatic. Eyes:      Pupils: Pupils are equal, round, and reactive to light. Cardiovascular:      Rate and Rhythm: Normal rate and regular rhythm. Heart sounds: Normal heart sounds. No murmur heard. Pulmonary:      Effort: Pulmonary effort is normal. No respiratory distress. Breath sounds: Normal breath sounds. No stridor. No wheezing. Abdominal:      General: Bowel sounds are normal. There is no distension. Palpations: Abdomen is soft. Tenderness: There is no abdominal tenderness. Musculoskeletal:         General: Normal range of motion. Cervical back: Neck supple. Skin:     General: Skin is warm and dry. Findings: No rash. Neurological:      Mental Status: She is alert and oriented to person, place, and time. Cranial Nerves: No cranial nerve deficit. Psychiatric:         Behavior: Behavior normal.       LABS:       Results for orders placed or performed during the hospital encounter of 01/18/23   Lipid, Fasting   Result Value Ref Range    Cholesterol, Fasting 171 <200 mg/dL    HDL 63 >40 mg/dL    LDL Cholesterol 93 0 - 130 mg/dL    Chol/HDL Ratio 2.7 <5    Triglyceride, Fasting 73 <150 mg/dL     IMPRESSION:     1. CLL (chronic lymphocytic leukemia) (HCC) [C91.10 (ICD-10-CM)]    Thrombocytopenia  Hypogammaglobulinemia    Patient Active Problem List   Diagnosis    Mixed hyperlipidemia    Hypothyroidism    Essential hypertension    CLL (chronic lymphocytic leukemia) ([facility] [address]    History of MRSA infection    Thrombocytopenia (HCC)    Hypogammaglobulinemia (HCC)    Vaginal & cervical stenosis    Abnormal CT scan, pelvis       PLAN:   Personally reviewed results of lab work-up, imaging studies and clinical data  Discussed results of lab work-up which shows persistent moderate thrombocytopenia but clinically patient denies excessive bleeding or spontaneous bruising. Discussed differential diagnosis which includes thrombocytopenia secondary to CLL as well as ITP and hypersplenism. At this point we will continue to monitor patient labs every 3 months. If decision is made to start patient on treatment ITP will have to be ruled out prior to that. 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 Patient was educated on signs and symptoms of progressive thrombocytopenia and advised to notify office if she noted any  Patient was noted to have splenomegaly on ultrasound  Discussed natural history of CLL.   Clinically patient does not have any B symptoms at this point  Recommend continued surveillance. Reviewed goals and expectations. Return to clinic in 3 months or sooner if clinically indicated    Charles Butler MD      This note is created with the assistance of a speech recognition program.  While intending to generate a document that actually reflects the content of the visit, the document can still have some errors including those of syntax and sound a like substitutions which may escape proof reading. It such instances, actual meaning can be extrapolated by contextual diversion.

## 2023-02-16 RX ORDER — RAMIPRIL 5 MG/1
5 CAPSULE ORAL DAILY
Qty: 90 CAPSULE | Refills: 3 | Status: SHIPPED | OUTPATIENT
Start: 2023-02-16

## 2023-02-20 ENCOUNTER — OFFICE VISIT (OUTPATIENT)
Dept: PRIMARY CARE CLINIC | Age: 78
End: 2023-02-20
Payer: MEDICARE

## 2023-02-20 VITALS — BODY MASS INDEX: 26.56 KG/M2 | SYSTOLIC BLOOD PRESSURE: 138 MMHG | DIASTOLIC BLOOD PRESSURE: 86 MMHG | WEIGHT: 145.2 LBS

## 2023-02-20 DIAGNOSIS — I10 ESSENTIAL HYPERTENSION: Primary | ICD-10-CM

## 2023-02-20 DIAGNOSIS — E03.9 HYPOTHYROIDISM, UNSPECIFIED TYPE: ICD-10-CM

## 2023-02-20 DIAGNOSIS — E78.2 MIXED HYPERLIPIDEMIA: ICD-10-CM

## 2023-02-20 PROCEDURE — G8427 DOCREV CUR MEDS BY ELIG CLIN: HCPCS | Performed by: NURSE PRACTITIONER

## 2023-02-20 PROCEDURE — 3075F SYST BP GE 130 - 139MM HG: CPT | Performed by: NURSE PRACTITIONER

## 2023-02-20 PROCEDURE — 3079F DIAST BP 80-89 MM HG: CPT | Performed by: NURSE PRACTITIONER

## 2023-02-20 PROCEDURE — 1123F ACP DISCUSS/DSCN MKR DOCD: CPT | Performed by: NURSE PRACTITIONER

## 2023-02-20 PROCEDURE — G8400 PT W/DXA NO RESULTS DOC: HCPCS | Performed by: NURSE PRACTITIONER

## 2023-02-20 PROCEDURE — G8484 FLU IMMUNIZE NO ADMIN: HCPCS | Performed by: NURSE PRACTITIONER

## 2023-02-20 PROCEDURE — 1036F TOBACCO NON-USER: CPT | Performed by: NURSE PRACTITIONER

## 2023-02-20 PROCEDURE — G8417 CALC BMI ABV UP PARAM F/U: HCPCS | Performed by: NURSE PRACTITIONER

## 2023-02-20 PROCEDURE — 1090F PRES/ABSN URINE INCON ASSESS: CPT | Performed by: NURSE PRACTITIONER

## 2023-02-20 PROCEDURE — 99213 OFFICE O/P EST LOW 20 MIN: CPT | Performed by: NURSE PRACTITIONER

## 2023-02-20 SDOH — ECONOMIC STABILITY: HOUSING INSECURITY
IN THE LAST 12 MONTHS, WAS THERE A TIME WHEN YOU DID NOT HAVE A STEADY PLACE TO SLEEP OR SLEPT IN A SHELTER (INCLUDING NOW)?: NO

## 2023-02-20 SDOH — ECONOMIC STABILITY: FOOD INSECURITY: WITHIN THE PAST 12 MONTHS, THE FOOD YOU BOUGHT JUST DIDN'T LAST AND YOU DIDN'T HAVE MONEY TO GET MORE.: NEVER TRUE

## 2023-02-20 SDOH — ECONOMIC STABILITY: FOOD INSECURITY: WITHIN THE PAST 12 MONTHS, YOU WORRIED THAT YOUR FOOD WOULD RUN OUT BEFORE YOU GOT MONEY TO BUY MORE.: NEVER TRUE

## 2023-02-20 SDOH — ECONOMIC STABILITY: INCOME INSECURITY: HOW HARD IS IT FOR YOU TO PAY FOR THE VERY BASICS LIKE FOOD, HOUSING, MEDICAL CARE, AND HEATING?: NOT HARD AT ALL

## 2023-02-20 ASSESSMENT — PATIENT HEALTH QUESTIONNAIRE - PHQ9
SUM OF ALL RESPONSES TO PHQ QUESTIONS 1-9: 0
SUM OF ALL RESPONSES TO PHQ QUESTIONS 1-9: 0
2. FEELING DOWN, DEPRESSED OR HOPELESS: 0
SUM OF ALL RESPONSES TO PHQ QUESTIONS 1-9: 0
SUM OF ALL RESPONSES TO PHQ9 QUESTIONS 1 & 2: 0
1. LITTLE INTEREST OR PLEASURE IN DOING THINGS: 0
SUM OF ALL RESPONSES TO PHQ QUESTIONS 1-9: 0

## 2023-02-20 ASSESSMENT — ENCOUNTER SYMPTOMS
EYE DISCHARGE: 0
COUGH: 0
ABDOMINAL PAIN: 0
WHEEZING: 0
DIARRHEA: 0
VOMITING: 0
COLOR CHANGE: 0
RHINORRHEA: 0
SHORTNESS OF BREATH: 0
EYE REDNESS: 0
NAUSEA: 0
SORE THROAT: 0

## 2023-02-20 NOTE — PROGRESS NOTES
[address] [address] PRIMARY CARE  [address] [address] [place] [address]  Dept: [phone]    Edna Mcfadden is a 76 y.o. female Established patient, who presents today for her medical conditions/complaints as noted below. Chief Complaint   Patient presents with    Hypertension    Discuss Labs              HPI:     HPI  Hypertension - Controlled today. States that she feels good. No issue with blood pressure. Check  it a couple times a week and 120's  -60's . Still stay active and not having any issues. Recently f/u with oncologist and everything is good. Seen an orthopedists and he said she had arthritis in her right hip. CLL - managed by Dr. Theo Palacios -  persistent moderate thrombocytopenia but clinically patient denies excessive bleeding or spontaneous bruising. Dr. Theo Palacios labs every 3 months.     Reviewed prior notes:  oncology    Reviewed previous:  Labs    LDL Cholesterol (mg/dL)   Date Value   01/18/2023 93   11/04/2021 146 (H)   10/13/2020 103       (goal LDL is <100)   AST (U/L)   Date Value   01/18/2023 28     ALT (U/L)   Date Value   01/18/2023 8     BUN (mg/dL)   Date Value   01/18/2023 10     Hemoglobin A1C (%)   Date Value   02/15/2022 5.9     TSH (mIU/L)   Date Value   02/28/2022 0.97     BP Readings from Last 3 Encounters:   02/20/23 138/86   02/08/23 (!) 169/90   01/03/23 126/84          (goal 120/80)    Past Medical History:   Diagnosis Date    CLL (chronic lymphocytic leukemia) ([facility] [address] 2004    Hyperlipidemia     Hypertension     Hypothyroidism     Leukocytosis     MRSA (methicillin resistant Staphylococcus aureus) infection     Thrombocytopenia (HCC)       Past Surgical History:   Procedure Laterality Date    BREAST BIOPSY Right     COLONOSCOPY      DILATION AND CURETTAGE OF UTERUS  05/17/2022    DILATATION AND CURETTAGE HYSTEROSCOPY MYOSURE WITH EXAM UNDER ANESTHESIA AND CERVICAL BIOPSY    DILATION AND CURETTAGE OF UTERUS N/A 05/17/2022    DILATATION AND CURETTAGE HYSTEROSCOPY MYOSURE WITH EXAM UNDER ANESTHESIA AND CERVICAL BIOPSY performed by Esteban Dominique DO at [address]  05/17/2022    TONSILLECTOMY      TUBAL LIGATION      UPPER GASTROINTESTINAL ENDOSCOPY         Family History   Problem Relation Age of Onset    Dementia Mother     High Blood Pressure Mother     Other Father         CLL    Arthritis Father        Social History     Tobacco Use    Smoking status: Never    Smokeless tobacco: Never   Substance Use Topics    Alcohol use: Yes     Alcohol/week: 1.0 standard drink     Types: 1 Glasses of wine per week     Comment: social      Current Outpatient Medications   Medication Sig Dispense Refill    ramipril (ALTACE) 5 MG capsule Take 1 capsule by mouth daily 90 capsule 3    levothyroxine (SYNTHROID) 88 MCG tablet Take 1 tablet by mouth Daily 90 tablet 3    metoprolol succinate (TOPROL XL) 25 MG extended release tablet Take 0.5 tablets by mouth daily 90 tablet 1    rosuvastatin (CRESTOR) 5 MG tablet Take 1 tablet by mouth daily 90 tablet 2    acetaminophen (TYLENOL) 500 MG tablet Take 2 tablets by mouth every 8 hours as needed for Pain 180 tablet 0     No current facility-administered medications for this visit. Allergies   Allergen Reactions    Penicillins Hives, Itching and Swelling    Epinephrine Other (See Comments)     Can tolerate lower doses, but if higher dose: will cause high heart rate.         Health Maintenance   Topic Date Due    Hepatitis C screen  Never done    DTaP/Tdap/Td vaccine (1 - Tdap) Never done    Shingles vaccine (1 of 2) Never done    COVID-19 Vaccine (4 - Booster for [employer] series) 12/03/2021    Annual Wellness Visit (AWV)  10/19/2023    Depression Screen  01/03/2024    Lipids  01/18/2024    DEXA (modify frequency per FRAX score)  Completed    Flu vaccine  Completed    Pneumococcal 65+ years Vaccine  Completed    Hepatitis A vaccine  Aged Out    Hib vaccine  Aged Out    Meningococcal (ACWY) vaccine Aged Out       Subjective:      Review of Systems   Constitutional:  Negative for chills and fever. HENT:  Negative for rhinorrhea and sore throat. Eyes:  Negative for discharge and redness. Respiratory:  Negative for cough, shortness of breath and wheezing. Cardiovascular:  Negative for chest pain and palpitations. Gastrointestinal:  Negative for abdominal pain, diarrhea, nausea and vomiting. Genitourinary:  Negative for difficulty urinating, dysuria and frequency. Musculoskeletal:  Negative for arthralgias and myalgias. Skin:  Negative for color change, pallor, rash and wound. Neurological:  Negative for dizziness, light-headedness and headaches. Psychiatric/Behavioral:  Negative for behavioral problems, decreased concentration, self-injury, sleep disturbance and suicidal ideas. The patient is not nervous/anxious. Objective:     /86   Wt 145 lb 3.2 oz (65.9 kg)   BMI 26.56 kg/m²   Physical Exam  Vitals and nursing note reviewed. Constitutional:       General: She is not in acute distress. Appearance: She is well-developed. She is not ill-appearing. HENT:      Head: Normocephalic and atraumatic. Right Ear: External ear normal.      Left Ear: External ear normal.   Eyes:      General: No scleral icterus. Right eye: No discharge. Left eye: No discharge. Conjunctiva/sclera: Conjunctivae normal.   Neck:      Thyroid: No thyromegaly. Trachea: No tracheal deviation. Cardiovascular:      Rate and Rhythm: Normal rate and regular rhythm. Heart sounds: Normal heart sounds. Comments: No carotid bruits    Pulmonary:      Effort: Pulmonary effort is normal. No respiratory distress. Breath sounds: Normal breath sounds. No wheezing. Abdominal:      General: Bowel sounds are normal.   Musculoskeletal:         General: No swelling or tenderness. Right lower leg: No edema. Left lower leg: No edema.    Lymphadenopathy:      Cervical: No cervical adenopathy. Skin:     General: Skin is warm. Findings: No rash. Neurological:      Mental Status: She is alert and oriented to person, place, and time. Psychiatric:         Mood and Affect: Mood normal.         Behavior: Behavior normal.         Thought Content: Thought content normal.       Assessment/Plan:   1. Essential hypertension  2. Mixed hyperlipidemia  3. Hypothyroidism, unspecified type  -     TSH; Future  -     T4, Free; Future     Continue metoprolol, ramipril, metoprolol succinate, & levothyroxine   Complete thyroid hormone lab work when complete next set of labs for Dr. Alisha Sorensen     Return in about 35 weeks (around 10/23/2023) for AWV. Data Unavailable     Orders Placed This Encounter   Procedures    TSH     Standing Status:   Future     Standing Expiration Date:   2/20/2024    T4, Free     Standing Status:   Future     Standing Expiration Date:   2/20/2024       No orders of the defined types were placed in this encounter. Patient given educational materials - see patient instructions. Discussed use, benefit, and side effects of prescribed medications. All patient questions answered. Pt voiced understanding. Reviewed health maintenance. Instructed to continue current medications, diet and exercise. Patient agreed with treatment plan. Follow up as directed.      Electronically signed by KIRA Peñaloza CNP on 2/20/2023 at 10:03 AM

## 2023-02-20 NOTE — PATIENT INSTRUCTIONS
Continue metoprolol, ramipril, metoprolol succinate, & levothyroxine   Complete thyroid hormone lab work when complete next set of labs for Dr. Mansfield Boas

## 2023-04-24 DIAGNOSIS — E03.9 HYPOTHYROIDISM, UNSPECIFIED TYPE: ICD-10-CM

## 2023-04-24 RX ORDER — LEVOTHYROXINE SODIUM 88 UG/1
88 TABLET ORAL DAILY
Qty: 90 TABLET | Refills: 3 | Status: SHIPPED | OUTPATIENT
Start: 2023-04-24

## 2023-04-24 NOTE — TELEPHONE ENCOUNTER
LAST VISIT:   1/12/2021     Future Appointments   Date Time Provider Patricia Valei   5/17/2023  9:45 AM Ketan Castaneda MD [address] [place] [address]   10/23/2023 10:00 AM KIRA Bedoya - FARZAD [facility]

## 2023-05-09 ENCOUNTER — HOSPITAL ENCOUNTER (OUTPATIENT)
Age: 78
Discharge: HOME | End: 2023-05-09
Payer: MEDICARE

## 2023-05-09 DIAGNOSIS — C91.10 CLL (CHRONIC LYMPHOCYTIC LEUKEMIA) (HCC): ICD-10-CM

## 2023-05-09 DIAGNOSIS — D80.1 HYPOGAMMAGLOBULINEMIA (HCC): ICD-10-CM

## 2023-05-09 LAB
ABSOLUTE EOS #: 0.26 K/UL (ref 0–0.4)
ABSOLUTE LYMPH #: 23.03 K/UL (ref 1–4.8)
ABSOLUTE MONO #: 0.26 K/UL (ref 0.1–0.8)
ALBUMIN SERPL-MCNC: 4.4 G/DL (ref 3.5–5.2)
ALBUMIN/GLOBULIN RATIO: 2.4 (ref 1–2.5)
ALP SERPL-CCNC: 72 U/L (ref 35–104)
ALT SERPL-CCNC: 8 U/L (ref 5–33)
ANION GAP SERPL CALCULATED.3IONS-SCNC: 10 MMOL/L (ref 9–17)
AST SERPL-CCNC: 26 U/L
BASOPHILS # BLD: 0 % (ref 0–2)
BASOPHILS ABSOLUTE: 0 K/UL (ref 0–0.2)
BILIRUB SERPL-MCNC: 0.7 MG/DL (ref 0.3–1.2)
BUN SERPL-MCNC: 15 MG/DL (ref 8–23)
CALCIUM SERPL-MCNC: 9.9 MG/DL (ref 8.6–10.4)
CHLORIDE SERPL-SCNC: 103 MMOL/L (ref 98–107)
CO2 SERPL-SCNC: 28 MMOL/L (ref 20–31)
CREAT SERPL-MCNC: 0.86 MG/DL (ref 0.5–0.9)
EOSINOPHILS RELATIVE PERCENT: 1 % (ref 1–4)
GFR SERPL CREATININE-BSD FRML MDRD: >60 ML/MIN/1.73M2
GLUCOSE SERPL-MCNC: 99 MG/DL (ref 70–99)
HCT VFR BLD AUTO: 39.9 % (ref 36–46)
HGB BLD-MCNC: 13 G/DL (ref 12–16)
LYMPHOCYTES # BLD: 90 % (ref 24–44)
MCH RBC QN AUTO: 32.2 PG (ref 26–34)
MCHC RBC AUTO-ENTMCNC: 32.5 G/DL (ref 31–37)
MCV RBC AUTO: 98.8 FL (ref 80–100)
MONOCYTES # BLD: 1 % (ref 1–7)
MORPHOLOGY: ABNORMAL
PDW BLD-RTO: 14 % (ref 12.5–15.4)
PLATELET # BLD AUTO: 92 K/UL (ref 140–450)
PMV BLD AUTO: 6.8 FL (ref 6–12)
POTASSIUM SERPL-SCNC: 4.5 MMOL/L (ref 3.7–5.3)
PROT SERPL-MCNC: 6.2 G/DL (ref 6.4–8.3)
RBC # BLD: 4.04 M/UL (ref 4–5.2)
SEG NEUTROPHILS: 8 % (ref 36–66)
SEGMENTED NEUTROPHILS ABSOLUTE COUNT: 2.05 K/UL (ref 1.8–7.7)
SODIUM SERPL-SCNC: 141 MMOL/L (ref 135–144)
WBC # BLD AUTO: 25.6 K/UL (ref 3.5–11)

## 2023-05-09 PROCEDURE — 80053 COMPREHEN METABOLIC PANEL: CPT

## 2023-05-09 PROCEDURE — 36415 COLL VENOUS BLD VENIPUNCTURE: CPT

## 2023-05-09 PROCEDURE — 82784 ASSAY IGA/IGD/IGG/IGM EACH: CPT

## 2023-05-09 PROCEDURE — 85025 COMPLETE CBC W/AUTO DIFF WBC: CPT

## 2023-05-09 PROCEDURE — 83615 LACTATE (LD) (LDH) ENZYME: CPT

## 2023-05-10 LAB
IGA SERPL-MCNC: ABNORMAL MG/DL (ref 70–400)
IGA, LOW RANGE: 28 MG/DL (ref 70–400)
IGG: 442 MG/DL (ref 700–1600)
IGM: <25 MG/DL (ref 40–230)
LDLC SERPL-MCNC: 231 U/L (ref 135–214)

## 2023-05-12 DIAGNOSIS — E78.2 MIXED HYPERLIPIDEMIA: ICD-10-CM

## 2023-05-12 NOTE — TELEPHONE ENCOUNTER
Last visit  2/20/23    Future Appointments   Date Time Provider Patricia Lizzy   5/17/2023  9:45 AM Temi Rasheed MD [address] [place] [address]   10/23/2023 10:00 AM KIRA Cruz - CNP [facility]

## 2023-05-13 RX ORDER — ROSUVASTATIN CALCIUM 5 MG/1
5 TABLET, COATED ORAL DAILY
Qty: 90 TABLET | Refills: 2 | Status: SHIPPED | OUTPATIENT
Start: 2023-05-13

## 2023-05-17 ENCOUNTER — TELEPHONE (OUTPATIENT)
Dept: ONCOLOGY | Age: 78
End: 2023-05-17

## 2023-05-17 ENCOUNTER — OFFICE VISIT (OUTPATIENT)
Dept: ONCOLOGY | Age: 78
End: 2023-05-17
Payer: MEDICARE

## 2023-05-17 VITALS
WEIGHT: 146.3 LBS | HEART RATE: 66 BPM | TEMPERATURE: 97.2 F | RESPIRATION RATE: 16 BRPM | BODY MASS INDEX: 26.76 KG/M2 | SYSTOLIC BLOOD PRESSURE: 169 MMHG | OXYGEN SATURATION: 98 % | DIASTOLIC BLOOD PRESSURE: 84 MMHG

## 2023-05-17 DIAGNOSIS — D80.1 HYPOGAMMAGLOBULINEMIA (HCC): ICD-10-CM

## 2023-05-17 DIAGNOSIS — C91.10 CLL (CHRONIC LYMPHOCYTIC LEUKEMIA) (HCC): Primary | ICD-10-CM

## 2023-05-17 DIAGNOSIS — D69.6 THROMBOCYTOPENIA (HCC): ICD-10-CM

## 2023-05-17 PROCEDURE — 1123F ACP DISCUSS/DSCN MKR DOCD: CPT | Performed by: INTERNAL MEDICINE

## 2023-05-17 PROCEDURE — 99214 OFFICE O/P EST MOD 30 MIN: CPT | Performed by: INTERNAL MEDICINE

## 2023-05-17 PROCEDURE — 3079F DIAST BP 80-89 MM HG: CPT | Performed by: INTERNAL MEDICINE

## 2023-05-17 PROCEDURE — 1090F PRES/ABSN URINE INCON ASSESS: CPT | Performed by: INTERNAL MEDICINE

## 2023-05-17 PROCEDURE — G8428 CUR MEDS NOT DOCUMENT: HCPCS | Performed by: INTERNAL MEDICINE

## 2023-05-17 PROCEDURE — G8400 PT W/DXA NO RESULTS DOC: HCPCS | Performed by: INTERNAL MEDICINE

## 2023-05-17 PROCEDURE — G8417 CALC BMI ABV UP PARAM F/U: HCPCS | Performed by: INTERNAL MEDICINE

## 2023-05-17 PROCEDURE — 1036F TOBACCO NON-USER: CPT | Performed by: INTERNAL MEDICINE

## 2023-05-17 PROCEDURE — 3077F SYST BP >= 140 MM HG: CPT | Performed by: INTERNAL MEDICINE

## 2023-05-17 PROCEDURE — 99211 OFF/OP EST MAY X REQ PHY/QHP: CPT | Performed by: INTERNAL MEDICINE

## 2023-05-17 NOTE — PROGRESS NOTES
ONCOLOGY NOTE    PCP: KIRA Hopkins CNP  Referring Provider: No ref. provider found     DIAGNOSIS:   CLL    CURRENT TREATMENT  Active surveillance    BRIEF CASE HISTORY:   Ora Nolen is a very pleasant 66 y.o. female who is presenting for follow-up for CLL. She was referred to me in 02/2020 for management of CLL/SLL which was diagnosed around September 2004. She had been living in [place] for quite some time. She moved to [place] in early 2019. She had MRSA infection in 2016. INTERIM HISTORY:  Patient presents to the clinic for a follow-up visit and to discuss results of her lab work-up and other relevant clinical data. Patient continues to be active. Denies any weight loss denies drenching night sweats denies any swollen glands. During this visit patient's allergy, social, medical, surgical history and medications were reviewed and updated.     PAST MEDICAL HISTORY:      Diagnosis Date    CLL (chronic lymphocytic leukemia) ([facility] [address] 2004    Hyperlipidemia     Hypertension     Hypothyroidism     Leukocytosis     MRSA (methicillin resistant Staphylococcus aureus) infection     Thrombocytopenia ([facility] [address]        PAST SURGICAL HISTORY:      Procedure Laterality Date    BREAST BIOPSY Right     COLONOSCOPY      DILATION AND CURETTAGE OF UTERUS  05/17/2022    DILATATION AND CURETTAGE HYSTEROSCOPY MYOSURE WITH EXAM UNDER ANESTHESIA AND CERVICAL BIOPSY    DILATION AND CURETTAGE OF UTERUS N/A 05/17/2022    DILATATION AND CURETTAGE HYSTEROSCOPY MYOSURE WITH EXAM UNDER ANESTHESIA AND CERVICAL BIOPSY performed by Margarette Corbin DO at [facility]   05/17/2022    TONSILLECTOMY      TUBAL LIGATION      UPPER GASTROINTESTINAL ENDOSCOPY         CURRENT MEDICATIONS:   Current Outpatient Medications   Medication Sig Dispense Refill    rosuvastatin (CRESTOR) 5 MG tablet Take 1 tablet by mouth daily 90 tablet 2    levothyroxine (SYNTHROID) 88 MCG tablet Take 1 tablet by mouth

## 2023-08-08 ENCOUNTER — HOSPITAL ENCOUNTER (OUTPATIENT)
Age: 78
Discharge: HOME | End: 2023-08-08
Payer: MEDICARE

## 2023-08-08 DIAGNOSIS — D80.1 HYPOGAMMAGLOBULINEMIA (HCC): ICD-10-CM

## 2023-08-08 DIAGNOSIS — D69.6 THROMBOCYTOPENIA (HCC): ICD-10-CM

## 2023-08-08 DIAGNOSIS — C91.10 CLL (CHRONIC LYMPHOCYTIC LEUKEMIA) (HCC): ICD-10-CM

## 2023-08-08 LAB
ALBUMIN SERPL-MCNC: 4.7 G/DL (ref 3.5–5.2)
ALBUMIN/GLOB SERPL: 2.4 {RATIO} (ref 1–2.5)
ALP SERPL-CCNC: 81 U/L (ref 35–104)
ALT SERPL-CCNC: 11 U/L (ref 5–33)
ANION GAP SERPL CALCULATED.3IONS-SCNC: 8 MMOL/L (ref 9–17)
AST SERPL-CCNC: 30 U/L
BASOPHILS # BLD: 0 K/UL (ref 0–0.2)
BASOPHILS NFR BLD: 0 % (ref 0–2)
BILIRUB SERPL-MCNC: 0.7 MG/DL (ref 0.3–1.2)
BUN SERPL-MCNC: 12 MG/DL (ref 8–23)
CALCIUM SERPL-MCNC: 9.9 MG/DL (ref 8.6–10.4)
CHLORIDE SERPL-SCNC: 100 MMOL/L (ref 98–107)
CO2 SERPL-SCNC: 28 MMOL/L (ref 20–31)
CREAT SERPL-MCNC: 0.8 MG/DL (ref 0.5–0.9)
EOSINOPHIL # BLD: 0.55 K/UL (ref 0–0.4)
EOSINOPHILS RELATIVE PERCENT: 2 % (ref 1–4)
ERYTHROCYTE [DISTWIDTH] IN BLOOD BY AUTOMATED COUNT: 14.2 % (ref 12.5–15.4)
GFR SERPL CREATININE-BSD FRML MDRD: >60 ML/MIN/1.73M2
GLUCOSE SERPL-MCNC: 90 MG/DL (ref 70–99)
HCT VFR BLD AUTO: 43 % (ref 36–46)
HGB BLD-MCNC: 14 G/DL (ref 12–16)
LDH SERPL-CCNC: 241 U/L (ref 135–214)
LYMPHOCYTES NFR BLD: 23.19 K/UL (ref 1–4.8)
LYMPHOCYTES RELATIVE PERCENT: 84 % (ref 24–44)
MCH RBC QN AUTO: 32.3 PG (ref 26–34)
MCHC RBC AUTO-ENTMCNC: 32.6 G/DL (ref 31–37)
MCV RBC AUTO: 98.9 FL (ref 80–100)
MONOCYTES NFR BLD: 0 % (ref 1–7)
MONOCYTES NFR BLD: 0 K/UL (ref 0.1–0.8)
MORPHOLOGY: ABNORMAL
NEUTROPHILS NFR BLD: 14 % (ref 36–66)
NEUTS SEG NFR BLD: 3.86 K/UL (ref 1.8–7.7)
PLATELET # BLD AUTO: 102 K/UL (ref 140–450)
PMV BLD AUTO: 7.2 FL (ref 6–12)
POTASSIUM SERPL-SCNC: 5.3 MMOL/L (ref 3.7–5.3)
PROT SERPL-MCNC: 6.7 G/DL (ref 6.4–8.3)
RBC # BLD AUTO: 4.35 M/UL (ref 4–5.2)
SODIUM SERPL-SCNC: 136 MMOL/L (ref 135–144)
WBC OTHER # BLD: 27.6 K/UL (ref 3.5–11)

## 2023-08-08 PROCEDURE — 83615 LACTATE (LD) (LDH) ENZYME: CPT

## 2023-08-08 PROCEDURE — 36415 COLL VENOUS BLD VENIPUNCTURE: CPT

## 2023-08-08 PROCEDURE — 80053 COMPREHEN METABOLIC PANEL: CPT

## 2023-08-08 PROCEDURE — 85025 COMPLETE CBC W/AUTO DIFF WBC: CPT

## 2023-08-23 ENCOUNTER — TELEPHONE (OUTPATIENT)
Dept: ONCOLOGY | Age: 78
End: 2023-08-23

## 2023-08-23 ENCOUNTER — OFFICE VISIT (OUTPATIENT)
Dept: ONCOLOGY | Age: 78
End: 2023-08-23
Payer: MEDICARE

## 2023-08-23 VITALS
SYSTOLIC BLOOD PRESSURE: 153 MMHG | BODY MASS INDEX: 26.43 KG/M2 | DIASTOLIC BLOOD PRESSURE: 82 MMHG | HEART RATE: 63 BPM | WEIGHT: 144.5 LBS | TEMPERATURE: 97.4 F

## 2023-08-23 DIAGNOSIS — C91.10 CLL (CHRONIC LYMPHOCYTIC LEUKEMIA) (HCC): Primary | ICD-10-CM

## 2023-08-23 DIAGNOSIS — D69.6 THROMBOCYTOPENIA (HCC): ICD-10-CM

## 2023-08-23 DIAGNOSIS — D80.1 HYPOGAMMAGLOBULINEMIA (HCC): ICD-10-CM

## 2023-08-23 PROCEDURE — 1036F TOBACCO NON-USER: CPT | Performed by: INTERNAL MEDICINE

## 2023-08-23 PROCEDURE — 3079F DIAST BP 80-89 MM HG: CPT | Performed by: INTERNAL MEDICINE

## 2023-08-23 PROCEDURE — G8427 DOCREV CUR MEDS BY ELIG CLIN: HCPCS | Performed by: INTERNAL MEDICINE

## 2023-08-23 PROCEDURE — 99211 OFF/OP EST MAY X REQ PHY/QHP: CPT | Performed by: INTERNAL MEDICINE

## 2023-08-23 PROCEDURE — G8417 CALC BMI ABV UP PARAM F/U: HCPCS | Performed by: INTERNAL MEDICINE

## 2023-08-23 PROCEDURE — G8400 PT W/DXA NO RESULTS DOC: HCPCS | Performed by: INTERNAL MEDICINE

## 2023-08-23 PROCEDURE — 1090F PRES/ABSN URINE INCON ASSESS: CPT | Performed by: INTERNAL MEDICINE

## 2023-08-23 PROCEDURE — 99214 OFFICE O/P EST MOD 30 MIN: CPT | Performed by: INTERNAL MEDICINE

## 2023-08-23 PROCEDURE — 1123F ACP DISCUSS/DSCN MKR DOCD: CPT | Performed by: INTERNAL MEDICINE

## 2023-08-23 PROCEDURE — 3077F SYST BP >= 140 MM HG: CPT | Performed by: INTERNAL MEDICINE

## 2023-08-23 NOTE — PROGRESS NOTES
ONCOLOGY NOTE    PCP: KIRA Heaton CNP  Referring Provider: No ref. provider found     DIAGNOSIS:   CLL    CURRENT TREATMENT  Active surveillance    BRIEF CASE HISTORY:   Josh Carlos is a very pleasant 66 y.o. female who is presenting for follow-up for CLL. She was referred to me in 02/2020 for management of CLL/SLL which was diagnosed around September 2004. She had been living in [place], [place] for quite some time. She moved to [place] in early 2019. She had MRSA infection in 2016. INTERIM HISTORY:  Patient presents to the clinic for a follow-up visit and to discuss results of her lab work-up and other relevant clinical data. Overall clinically patient continues to do well. Denies any swollen glands drenching night sweats or unintentional weight loss. WBC count remains elevated    During this visit patient's allergy, social, medical, surgical history and medications were reviewed and updated.     PAST MEDICAL HISTORY:      Diagnosis Date    CLL (chronic lymphocytic leukemia) ([address]) 2004    Hyperlipidemia     Hypertension     Hypothyroidism     Leukocytosis     MRSA (methicillin resistant Staphylococcus aureus) infection     Thrombocytopenia ([address])        PAST SURGICAL HISTORY:      Procedure Laterality Date    BREAST BIOPSY Right     COLONOSCOPY      DILATION AND CURETTAGE OF UTERUS  05/17/2022    DILATATION AND CURETTAGE HYSTEROSCOPY MYOSURE WITH EXAM UNDER ANESTHESIA AND CERVICAL BIOPSY    DILATION AND CURETTAGE OF UTERUS N/A 05/17/2022    DILATATION AND CURETTAGE HYSTEROSCOPY MYOSURE WITH EXAM UNDER ANESTHESIA AND CERVICAL BIOPSY performed by Lila Almanza DO at [address]  05/17/2022    TONSILLECTOMY      TUBAL LIGATION      UPPER GASTROINTESTINAL ENDOSCOPY         CURRENT MEDICATIONS:   Current Outpatient Medications   Medication Sig Dispense Refill    rosuvastatin (CRESTOR) 5 MG tablet Take 1 tablet by mouth daily 90 tablet 2    levothyroxine

## 2023-08-23 NOTE — TELEPHONE ENCOUNTER
AVS from 8/23/23    Rv in 3 months with labs     Pt will have labs drawn one week prior to RV    Rv scheduled for 11/29/23 @ 11:15am    PT was given AVS and appt schedule    Electronically signed by Juan Alberto Keith on 8/23/2023 at 2:33 PM

## 2023-10-17 ENCOUNTER — HOSPITAL ENCOUNTER (OUTPATIENT)
Age: 78
Discharge: HOME | End: 2023-10-17
Payer: MEDICARE

## 2023-10-17 DIAGNOSIS — E03.9 HYPOTHYROIDISM, UNSPECIFIED TYPE: ICD-10-CM

## 2023-10-17 LAB
T4 FREE SERPL-MCNC: 1.8 NG/DL (ref 0.93–1.7)
TSH SERPL DL<=0.05 MIU/L-ACNC: 2.05 UIU/ML (ref 0.3–5)

## 2023-10-17 PROCEDURE — 84439 ASSAY OF FREE THYROXINE: CPT

## 2023-10-17 PROCEDURE — 84443 ASSAY THYROID STIM HORMONE: CPT

## 2023-10-17 PROCEDURE — 36415 COLL VENOUS BLD VENIPUNCTURE: CPT

## 2023-10-17 SDOH — HEALTH STABILITY: PHYSICAL HEALTH: ON AVERAGE, HOW MANY MINUTES DO YOU ENGAGE IN EXERCISE AT THIS LEVEL?: 30 MIN

## 2023-10-17 SDOH — HEALTH STABILITY: PHYSICAL HEALTH: ON AVERAGE, HOW MANY DAYS PER WEEK DO YOU ENGAGE IN MODERATE TO STRENUOUS EXERCISE (LIKE A BRISK WALK)?: 6 DAYS

## 2023-10-17 ASSESSMENT — PATIENT HEALTH QUESTIONNAIRE - PHQ9
SUM OF ALL RESPONSES TO PHQ QUESTIONS 1-9: 0
SUM OF ALL RESPONSES TO PHQ QUESTIONS 1-9: 0
2. FEELING DOWN, DEPRESSED OR HOPELESS: 0
SUM OF ALL RESPONSES TO PHQ QUESTIONS 1-9: 0
SUM OF ALL RESPONSES TO PHQ QUESTIONS 1-9: 0
1. LITTLE INTEREST OR PLEASURE IN DOING THINGS: 0
SUM OF ALL RESPONSES TO PHQ9 QUESTIONS 1 & 2: 0

## 2023-10-17 ASSESSMENT — LIFESTYLE VARIABLES
HOW MANY STANDARD DRINKS CONTAINING ALCOHOL DO YOU HAVE ON A TYPICAL DAY: 1
HOW OFTEN DO YOU HAVE A DRINK CONTAINING ALCOHOL: 2
HOW OFTEN DO YOU HAVE SIX OR MORE DRINKS ON ONE OCCASION: 1
HOW MANY STANDARD DRINKS CONTAINING ALCOHOL DO YOU HAVE ON A TYPICAL DAY: 1 OR 2
HOW OFTEN DO YOU HAVE A DRINK CONTAINING ALCOHOL: MONTHLY OR LESS

## 2023-10-23 ENCOUNTER — OFFICE VISIT (OUTPATIENT)
Dept: PRIMARY CARE CLINIC | Age: 78
End: 2023-10-23
Payer: MEDICARE

## 2023-10-23 VITALS
HEIGHT: 62 IN | SYSTOLIC BLOOD PRESSURE: 154 MMHG | DIASTOLIC BLOOD PRESSURE: 90 MMHG | BODY MASS INDEX: 26.79 KG/M2 | OXYGEN SATURATION: 100 % | HEART RATE: 55 BPM | WEIGHT: 145.6 LBS

## 2023-10-23 DIAGNOSIS — I10 ESSENTIAL HYPERTENSION: ICD-10-CM

## 2023-10-23 DIAGNOSIS — Z00.00 MEDICARE ANNUAL WELLNESS VISIT, SUBSEQUENT: Primary | ICD-10-CM

## 2023-10-23 PROCEDURE — 1123F ACP DISCUSS/DSCN MKR DOCD: CPT | Performed by: NURSE PRACTITIONER

## 2023-10-23 PROCEDURE — 3080F DIAST BP >= 90 MM HG: CPT | Performed by: NURSE PRACTITIONER

## 2023-10-23 PROCEDURE — 3077F SYST BP >= 140 MM HG: CPT | Performed by: NURSE PRACTITIONER

## 2023-10-23 PROCEDURE — G8484 FLU IMMUNIZE NO ADMIN: HCPCS | Performed by: NURSE PRACTITIONER

## 2023-10-23 PROCEDURE — G0439 PPPS, SUBSEQ VISIT: HCPCS | Performed by: NURSE PRACTITIONER

## 2023-10-23 NOTE — PROGRESS NOTES
light, extraocular eye movements intact, conjunctivae normal  ENT: tympanic membrane, external ear and ear canal normal bilaterally, oropharynx clear and moist with normal mucous membranes, hearing grossly normal bilaterally, , and good dentition  Neck: neck supple and non tender without mass, no thyromegaly or thyroid nodules, no cervical lymphadenopathy   Pulmonary/Chest: clear to auscultation bilaterally- no wheezes, rales or rhonchi, normal air movement, no respiratory distress  Cardiovascular: normal rate, normal S1 and S2, no gallops, and no carotid bruits  Abdomen: soft, non-tender, non-distended, normal bowel sounds, no masses or organomegaly  Extremities: no cyanosis, no clubbing, and no edema  Musculoskeletal: normal range of motion, no joint swelling, deformity or tenderness  Neurologic: no cranial nerve deficit, gait and coordination normal, and speech normal       Allergies   Allergen Reactions    Penicillins Hives, Itching and Swelling    Epinephrine Other (See Comments)     Can tolerate lower doses, but if higher dose: will cause high heart rate. Prior to Visit Medications    Medication Sig Taking?  Authorizing Provider   rosuvastatin (CRESTOR) 5 MG tablet Take 1 tablet by mouth daily Yes KIRA Argueta CNP   levothyroxine (SYNTHROID) 88 MCG tablet Take 1 tablet by mouth Daily Yes Jorge Nolan APRN - CNP   ramipril (ALTACE) 5 MG capsule Take 1 capsule by mouth daily Yes KIRA Argueta CNP   metoprolol succinate (TOPROL XL) 25 MG extended release tablet Take 0.5 tablets by mouth daily Yes KIRA Argueta CNP   acetaminophen (TYLENOL) 500 MG tablet Take 2 tablets by mouth every 8 hours as needed for Pain Yes KIRA Argueta CNP       CareTeam (Including outside providers/suppliers regularly involved in providing care):   Patient Care Team:  KIRA Argueta CNP as PCP - General (Family Nurse Practitioner)  KIRA Argueta CNP as PCP - [facility]

## 2023-11-14 ENCOUNTER — HOSPITAL ENCOUNTER (OUTPATIENT)
Age: 78
Discharge: HOME | End: 2023-11-14
Payer: MEDICARE

## 2023-11-14 DIAGNOSIS — D80.1 HYPOGAMMAGLOBULINEMIA (HCC): ICD-10-CM

## 2023-11-14 DIAGNOSIS — D69.6 THROMBOCYTOPENIA (HCC): ICD-10-CM

## 2023-11-14 DIAGNOSIS — C91.10 CLL (CHRONIC LYMPHOCYTIC LEUKEMIA) (HCC): ICD-10-CM

## 2023-11-14 LAB
ALBUMIN SERPL-MCNC: 4.3 G/DL (ref 3.5–5.2)
ALBUMIN/GLOB SERPL: 2.2 {RATIO} (ref 1–2.5)
ALP SERPL-CCNC: 81 U/L (ref 35–104)
ALT SERPL-CCNC: 7 U/L (ref 5–33)
ANION GAP SERPL CALCULATED.3IONS-SCNC: 10 MMOL/L (ref 9–17)
AST SERPL-CCNC: 23 U/L
BASOPHILS # BLD: 0 K/UL (ref 0–0.2)
BASOPHILS NFR BLD: 0 % (ref 0–2)
BILIRUB SERPL-MCNC: 0.6 MG/DL (ref 0.3–1.2)
BUN SERPL-MCNC: 11 MG/DL (ref 8–23)
CALCIUM SERPL-MCNC: 9.5 MG/DL (ref 8.6–10.4)
CHLORIDE SERPL-SCNC: 100 MMOL/L (ref 98–107)
CO2 SERPL-SCNC: 29 MMOL/L (ref 20–31)
CREAT SERPL-MCNC: 0.8 MG/DL (ref 0.5–0.9)
EOSINOPHIL # BLD: 0 K/UL (ref 0–0.4)
EOSINOPHILS RELATIVE PERCENT: 0 % (ref 1–4)
ERYTHROCYTE [DISTWIDTH] IN BLOOD BY AUTOMATED COUNT: 13.6 % (ref 12.5–15.4)
GFR SERPL CREATININE-BSD FRML MDRD: >60 ML/MIN/1.73M2
GLUCOSE SERPL-MCNC: 93 MG/DL (ref 70–99)
HCT VFR BLD AUTO: 39.4 % (ref 36–46)
HGB BLD-MCNC: 13.2 G/DL (ref 12–16)
LDH SERPL-CCNC: 218 U/L (ref 135–214)
LYMPHOCYTES NFR BLD: 26.28 K/UL (ref 1–4.8)
LYMPHOCYTES RELATIVE PERCENT: 90 % (ref 24–44)
MCH RBC QN AUTO: 33 PG (ref 26–34)
MCHC RBC AUTO-ENTMCNC: 33.4 G/DL (ref 31–37)
MCV RBC AUTO: 98.7 FL (ref 80–100)
MONOCYTES NFR BLD: 0.58 K/UL (ref 0.1–0.8)
MONOCYTES NFR BLD: 2 % (ref 1–7)
MORPHOLOGY: NORMAL
NEUTROPHILS NFR BLD: 8 % (ref 36–66)
NEUTS SEG NFR BLD: 2.34 K/UL (ref 1.8–7.7)
PLATELET # BLD AUTO: 126 K/UL (ref 140–450)
PMV BLD AUTO: 6.5 FL (ref 6–12)
POTASSIUM SERPL-SCNC: 4.9 MMOL/L (ref 3.7–5.3)
PROT SERPL-MCNC: 6.3 G/DL (ref 6.4–8.3)
RBC # BLD AUTO: 3.99 M/UL (ref 4–5.2)
SODIUM SERPL-SCNC: 139 MMOL/L (ref 135–144)
WBC OTHER # BLD: 29.2 K/UL (ref 3.5–11)

## 2023-11-14 PROCEDURE — 36415 COLL VENOUS BLD VENIPUNCTURE: CPT

## 2023-11-14 PROCEDURE — 83615 LACTATE (LD) (LDH) ENZYME: CPT

## 2023-11-14 PROCEDURE — 80053 COMPREHEN METABOLIC PANEL: CPT

## 2023-11-14 PROCEDURE — 85025 COMPLETE CBC W/AUTO DIFF WBC: CPT

## 2023-11-29 ENCOUNTER — OFFICE VISIT (OUTPATIENT)
Dept: ONCOLOGY | Age: 78
End: 2023-11-29
Payer: MEDICARE

## 2023-11-29 ENCOUNTER — TELEPHONE (OUTPATIENT)
Dept: ONCOLOGY | Age: 78
End: 2023-11-29

## 2023-11-29 VITALS
BODY MASS INDEX: 26.81 KG/M2 | TEMPERATURE: 96.9 F | OXYGEN SATURATION: 99 % | RESPIRATION RATE: 18 BRPM | SYSTOLIC BLOOD PRESSURE: 148 MMHG | DIASTOLIC BLOOD PRESSURE: 81 MMHG | WEIGHT: 146.6 LBS | HEART RATE: 68 BPM

## 2023-11-29 DIAGNOSIS — R16.1 SPLENOMEGALY: ICD-10-CM

## 2023-11-29 DIAGNOSIS — D69.6 THROMBOCYTOPENIA (HCC): ICD-10-CM

## 2023-11-29 DIAGNOSIS — C91.10 CLL (CHRONIC LYMPHOCYTIC LEUKEMIA) (HCC): Primary | ICD-10-CM

## 2023-11-29 PROCEDURE — 1090F PRES/ABSN URINE INCON ASSESS: CPT | Performed by: INTERNAL MEDICINE

## 2023-11-29 PROCEDURE — G8400 PT W/DXA NO RESULTS DOC: HCPCS | Performed by: INTERNAL MEDICINE

## 2023-11-29 PROCEDURE — 1123F ACP DISCUSS/DSCN MKR DOCD: CPT | Performed by: INTERNAL MEDICINE

## 2023-11-29 PROCEDURE — 1036F TOBACCO NON-USER: CPT | Performed by: INTERNAL MEDICINE

## 2023-11-29 PROCEDURE — 3074F SYST BP LT 130 MM HG: CPT | Performed by: INTERNAL MEDICINE

## 2023-11-29 PROCEDURE — 99214 OFFICE O/P EST MOD 30 MIN: CPT | Performed by: INTERNAL MEDICINE

## 2023-11-29 PROCEDURE — G8427 DOCREV CUR MEDS BY ELIG CLIN: HCPCS | Performed by: INTERNAL MEDICINE

## 2023-11-29 PROCEDURE — 3078F DIAST BP <80 MM HG: CPT | Performed by: INTERNAL MEDICINE

## 2023-11-29 PROCEDURE — G8417 CALC BMI ABV UP PARAM F/U: HCPCS | Performed by: INTERNAL MEDICINE

## 2023-11-29 PROCEDURE — G8484 FLU IMMUNIZE NO ADMIN: HCPCS | Performed by: INTERNAL MEDICINE

## 2023-11-29 PROCEDURE — 99211 OFF/OP EST MAY X REQ PHY/QHP: CPT | Performed by: INTERNAL MEDICINE

## 2023-11-29 NOTE — TELEPHONE ENCOUNTER
Rv in 4 months with labs 1 week prior       Pt will have labs drawn one week prior to RV    Rv scheduled for 3/27/24 @ 10:45am     PT was given AVS and appt schedule    Electronically signed by Kalpana Govea on 11/29/2023 at 12:35 PM

## 2023-11-29 NOTE — PROGRESS NOTES
ONCOLOGY NOTE    PCP: KIRA Young CNP  Referring Provider: No ref. provider found     DIAGNOSIS:   CLL    CURRENT TREATMENT  Active surveillance    BRIEF CASE HISTORY:   Erik Rg is a very pleasant 66 y.o. female who is presenting for follow-up for CLL. She was referred to me in 02/2020 for management of CLL/SLL which was diagnosed around September 2004. She had been living in [place], [place] for quite some time. She moved to [place] in early 2019. She had MRSA infection in 2016. INTERIM HISTORY:  Patient presents to the clinic for a follow-up visit and to discuss results of her lab work-up and other relevant clinical data. Overall patient continues to do well. Denies any swollen glands drenching night sweats or unintentional weight loss. WBC count remains elevated. Platelet count improved    During this visit patient's allergy, social, medical, surgical history and medications were reviewed and updated.     PAST MEDICAL HISTORY:      Diagnosis Date    CLL (chronic lymphocytic leukemia) ([address]) 2004    Hyperlipidemia     Hypertension     Hypothyroidism     Leukocytosis     MRSA (methicillin resistant Staphylococcus aureus) infection     Thrombocytopenia ([address])        PAST SURGICAL HISTORY:      Procedure Laterality Date    BREAST BIOPSY Right     COLONOSCOPY      DILATION AND CURETTAGE OF UTERUS  05/17/2022    DILATATION AND CURETTAGE HYSTEROSCOPY MYOSURE WITH EXAM UNDER ANESTHESIA AND CERVICAL BIOPSY    DILATION AND CURETTAGE OF UTERUS N/A 05/17/2022    DILATATION AND CURETTAGE HYSTEROSCOPY MYOSURE WITH EXAM UNDER ANESTHESIA AND CERVICAL BIOPSY performed by Yelena Gunn DO at [address] [facility]  05/17/2022    TONSILLECTOMY      TUBAL LIGATION      UPPER GASTROINTESTINAL ENDOSCOPY         CURRENT MEDICATIONS:   Current Outpatient Medications   Medication Sig Dispense Refill    rosuvastatin (CRESTOR) 5 MG tablet Take 1 tablet by mouth daily 90 tablet 2

## 2024-01-03 DIAGNOSIS — E78.2 MIXED HYPERLIPIDEMIA: ICD-10-CM

## 2024-01-04 RX ORDER — ROSUVASTATIN CALCIUM 5 MG/1
5 TABLET, COATED ORAL DAILY
Qty: 90 TABLET | Refills: 2 | Status: SHIPPED | OUTPATIENT
Start: 2024-01-04

## 2024-02-06 ENCOUNTER — TELEPHONE (OUTPATIENT)
Dept: PRIMARY CARE CLINIC | Age: 79
End: 2024-02-06

## 2024-02-06 DIAGNOSIS — H91.90 HEARING LOSS, UNSPECIFIED HEARING LOSS TYPE, UNSPECIFIED LATERALITY: Primary | ICD-10-CM

## 2024-02-06 NOTE — TELEPHONE ENCOUNTER
Pt asking for a referral to [facility] for hearing eval. Fax # [phone].  Pt has appt on 2/21/24.

## 2024-02-14 RX ORDER — RAMIPRIL 5 MG/1
5 CAPSULE ORAL DAILY
Qty: 90 CAPSULE | Refills: 3 | Status: SHIPPED | OUTPATIENT
Start: 2024-02-14

## 2024-02-29 ENCOUNTER — HOSPITAL ENCOUNTER (OUTPATIENT)
Dept: MAMMOGRAPHY | Age: 79
Discharge: HOME | End: 2024-03-02
Payer: MEDICARE

## 2024-02-29 DIAGNOSIS — Z12.31 SCREENING MAMMOGRAM FOR HIGH-RISK PATIENT: ICD-10-CM

## 2024-02-29 PROCEDURE — 77063 BREAST TOMOSYNTHESIS BI: CPT

## 2024-03-11 DIAGNOSIS — I10 ESSENTIAL HYPERTENSION: ICD-10-CM

## 2024-03-11 RX ORDER — METOPROLOL SUCCINATE 25 MG/1
12.5 TABLET, EXTENDED RELEASE ORAL DAILY
Qty: 90 TABLET | Refills: 1 | Status: SHIPPED | OUTPATIENT
Start: 2024-03-11

## 2024-03-11 NOTE — TELEPHONE ENCOUNTER
LAST VISIT:   10/23/2023     Future Appointments   Date Time Provider Department Center   3/27/2024 10:45 AM Home Braxton MD [facility]   4/30/2024  2:00 PM Blane [facility]       Pharmacy verified? Yes       [place], [place] - [address] - P [phone] - F [phone]  [address]  [place] [id number]  Phone: [phone] Fax: [phone]

## 2024-03-19 ENCOUNTER — HOSPITAL ENCOUNTER (OUTPATIENT)
Age: 79
Discharge: HOME | End: 2024-03-19
Payer: MEDICARE

## 2024-03-19 DIAGNOSIS — R16.1 SPLENOMEGALY: ICD-10-CM

## 2024-03-19 DIAGNOSIS — C91.10 CLL (CHRONIC LYMPHOCYTIC LEUKEMIA) (HCC): Primary | ICD-10-CM

## 2024-03-19 DIAGNOSIS — D69.6 THROMBOCYTOPENIA (HCC): ICD-10-CM

## 2024-03-19 DIAGNOSIS — C91.10 CLL (CHRONIC LYMPHOCYTIC LEUKEMIA) (HCC): ICD-10-CM

## 2024-03-19 LAB
ALBUMIN SERPL-MCNC: 4.6 G/DL (ref 3.5–5.2)
ALBUMIN/GLOB SERPL: 2.4 {RATIO} (ref 1–2.5)
ALP SERPL-CCNC: 77 U/L (ref 35–104)
ALT SERPL-CCNC: 9 U/L (ref 5–33)
ANION GAP SERPL CALCULATED.3IONS-SCNC: 8 MMOL/L (ref 9–17)
AST SERPL-CCNC: 26 U/L
BASOPHILS # BLD: 0 K/UL (ref 0–0.2)
BASOPHILS NFR BLD: 0 % (ref 0–2)
BILIRUB SERPL-MCNC: 0.6 MG/DL (ref 0.3–1.2)
BUN SERPL-MCNC: 10 MG/DL (ref 8–23)
CALCIUM SERPL-MCNC: 9.8 MG/DL (ref 8.6–10.4)
CHLORIDE SERPL-SCNC: 103 MMOL/L (ref 98–107)
CO2 SERPL-SCNC: 29 MMOL/L (ref 20–31)
CREAT SERPL-MCNC: 0.8 MG/DL (ref 0.5–0.9)
EOSINOPHIL # BLD: 0.23 K/UL (ref 0–0.4)
EOSINOPHILS RELATIVE PERCENT: 1 % (ref 1–4)
ERYTHROCYTE [DISTWIDTH] IN BLOOD BY AUTOMATED COUNT: 13.3 % (ref 12.5–15.4)
GFR SERPL CREATININE-BSD FRML MDRD: >60 ML/MIN/1.73M2
GLUCOSE SERPL-MCNC: 92 MG/DL (ref 70–99)
HCT VFR BLD AUTO: 41.2 % (ref 36–46)
HGB BLD-MCNC: 13.7 G/DL (ref 12–16)
LDH SERPL-CCNC: 239 U/L (ref 135–214)
LYMPHOCYTES NFR BLD: 19.15 K/UL (ref 1–4.8)
LYMPHOCYTES RELATIVE PERCENT: 84 % (ref 24–44)
MCH RBC QN AUTO: 32.5 PG (ref 26–34)
MCHC RBC AUTO-ENTMCNC: 33.3 G/DL (ref 31–37)
MCV RBC AUTO: 97.7 FL (ref 80–100)
MONOCYTES NFR BLD: 0.68 K/UL (ref 0.1–0.8)
MONOCYTES NFR BLD: 3 % (ref 1–7)
MORPHOLOGY: NORMAL
NEUTROPHILS NFR BLD: 12 % (ref 36–66)
NEUTS SEG NFR BLD: 2.74 K/UL (ref 1.8–7.7)
PLATELET # BLD AUTO: 92 K/UL (ref 140–450)
PMV BLD AUTO: 7.1 FL (ref 6–12)
POTASSIUM SERPL-SCNC: 4.5 MMOL/L (ref 3.7–5.3)
PROT SERPL-MCNC: 6.5 G/DL (ref 6.4–8.3)
RBC # BLD AUTO: 4.22 M/UL (ref 4–5.2)
SODIUM SERPL-SCNC: 140 MMOL/L (ref 135–144)
WBC OTHER # BLD: 22.8 K/UL (ref 3.5–11)

## 2024-03-19 PROCEDURE — 80053 COMPREHEN METABOLIC PANEL: CPT

## 2024-03-19 PROCEDURE — 85025 COMPLETE CBC W/AUTO DIFF WBC: CPT

## 2024-03-19 PROCEDURE — 36415 COLL VENOUS BLD VENIPUNCTURE: CPT

## 2024-03-19 PROCEDURE — 83615 LACTATE (LD) (LDH) ENZYME: CPT

## 2024-03-29 ENCOUNTER — OFFICE VISIT (OUTPATIENT)
Dept: PRIMARY CARE CLINIC | Age: 79
End: 2024-03-29

## 2024-03-29 VITALS
SYSTOLIC BLOOD PRESSURE: 174 MMHG | DIASTOLIC BLOOD PRESSURE: 92 MMHG | OXYGEN SATURATION: 97 % | HEART RATE: 90 BPM | BODY MASS INDEX: 26.68 KG/M2 | HEIGHT: 62 IN | WEIGHT: 145 LBS

## 2024-03-29 DIAGNOSIS — Z00.00 ANNUAL PHYSICAL EXAM: ICD-10-CM

## 2024-03-29 DIAGNOSIS — Z13.220 ENCOUNTER FOR LIPID SCREENING FOR CARDIOVASCULAR DISEASE: ICD-10-CM

## 2024-03-29 DIAGNOSIS — E03.9 HYPOTHYROIDISM, UNSPECIFIED TYPE: ICD-10-CM

## 2024-03-29 DIAGNOSIS — C91.10 CLL (CHRONIC LYMPHOCYTIC LEUKEMIA) (HCC): ICD-10-CM

## 2024-03-29 DIAGNOSIS — H66.001 NON-RECURRENT ACUTE SUPPURATIVE OTITIS MEDIA OF RIGHT EAR WITHOUT SPONTANEOUS RUPTURE OF TYMPANIC MEMBRANE: Primary | ICD-10-CM

## 2024-03-29 DIAGNOSIS — Z13.6 ENCOUNTER FOR LIPID SCREENING FOR CARDIOVASCULAR DISEASE: ICD-10-CM

## 2024-03-29 DIAGNOSIS — H65.01 NON-RECURRENT ACUTE SEROUS OTITIS MEDIA OF RIGHT EAR: ICD-10-CM

## 2024-03-29 PROBLEM — D80.1 HYPOGAMMAGLOBULINEMIA (HCC): Status: RESOLVED | Noted: 2021-01-12 | Resolved: 2024-03-29

## 2024-03-29 PROBLEM — D69.6 THROMBOCYTOPENIA (HCC): Status: RESOLVED | Noted: 2020-01-06 | Resolved: 2024-03-29

## 2024-03-29 RX ORDER — CEFUROXIME AXETIL 500 MG/1
500 TABLET ORAL 2 TIMES DAILY
Qty: 20 TABLET | Refills: 0 | Status: SHIPPED | OUTPATIENT
Start: 2024-03-29 | End: 2024-04-08

## 2024-03-29 SDOH — ECONOMIC STABILITY: FOOD INSECURITY: WITHIN THE PAST 12 MONTHS, YOU WORRIED THAT YOUR FOOD WOULD RUN OUT BEFORE YOU GOT MONEY TO BUY MORE.: NEVER TRUE

## 2024-03-29 SDOH — ECONOMIC STABILITY: TRANSPORTATION INSECURITY
IN THE PAST 12 MONTHS, HAS LACK OF TRANSPORTATION KEPT YOU FROM MEETINGS, WORK, OR FROM GETTING THINGS NEEDED FOR DAILY LIVING?: NO

## 2024-03-29 SDOH — ECONOMIC STABILITY: FOOD INSECURITY: WITHIN THE PAST 12 MONTHS, THE FOOD YOU BOUGHT JUST DIDN'T LAST AND YOU DIDN'T HAVE MONEY TO GET MORE.: NEVER TRUE

## 2024-03-29 SDOH — ECONOMIC STABILITY: INCOME INSECURITY: HOW HARD IS IT FOR YOU TO PAY FOR THE VERY BASICS LIKE FOOD, HOUSING, MEDICAL CARE, AND HEATING?: NOT HARD AT ALL

## 2024-03-29 ASSESSMENT — PATIENT HEALTH QUESTIONNAIRE - PHQ9
SUM OF ALL RESPONSES TO PHQ QUESTIONS 1-9: 0
SUM OF ALL RESPONSES TO PHQ9 QUESTIONS 1 & 2: 0
SUM OF ALL RESPONSES TO PHQ9 QUESTIONS 1 & 2: 0
1. LITTLE INTEREST OR PLEASURE IN DOING THINGS: NOT AT ALL
1. LITTLE INTEREST OR PLEASURE IN DOING THINGS: NOT AT ALL
SUM OF ALL RESPONSES TO PHQ QUESTIONS 1-9: 0
2. FEELING DOWN, DEPRESSED OR HOPELESS: NOT AT ALL
SUM OF ALL RESPONSES TO PHQ QUESTIONS 1-9: 0
2. FEELING DOWN, DEPRESSED OR HOPELESS: NOT AT ALL
SUM OF ALL RESPONSES TO PHQ QUESTIONS 1-9: 0

## 2024-03-29 ASSESSMENT — ENCOUNTER SYMPTOMS
SORE THROAT: 0
WHEEZING: 0
SHORTNESS OF BREATH: 0
COUGH: 0
ABDOMINAL PAIN: 0
EYE REDNESS: 0
NAUSEA: 0
EYE DISCHARGE: 0
DIARRHEA: 0
RHINORRHEA: 0
VOMITING: 0

## 2024-03-29 NOTE — PROGRESS NOTES
[facility]  [facility] PRIMARY [facility]ARE  [address] [facility] [address]  Dept: [phone]    Sandi Lima is a 78 y.o. female Established patient, who presents today for her medical conditions/complaints as noted below.      Chief Complaint   Patient presents with    Otalgia     Lost voice on Tuesday, ear pain started ,   Has ruputered ear drum 2 different time before    Drainage     she does have drainage but not coming from the nose out it is going down her throat.       HPI:     HPI  Pt states started 3 days ago, severe sore throat.  Loss voice 2 days ago, voice beginning to come back.  No fever.  Pt developed right ear pain last night with clicking.    Denies cloudy sinus congestion.  No change in hearing.     Patient has history of CLL.  Currently not requiring treatment.  Being monitored only by hematology.    Reviewed prior notes None  Reviewed previous Labs    LDL Cholesterol (mg/dL)   Date Value   01/18/2023 93   11/04/2021 146 (H)   10/13/2020 103       (goal LDL is <100)   AST (U/L)   Date Value   03/19/2024 26     ALT (U/L)   Date Value   03/19/2024 9     BUN (mg/dL)   Date Value   03/19/2024 10     Hemoglobin A1C (%)   Date Value   02/15/2022 5.9     TSH (uIU/mL)   Date Value   10/17/2023 2.05     BP Readings from Last 3 Encounters:   03/29/24 (!) 174/92   11/29/23 (!) 148/81   10/23/23 (!) 154/90          (goal 120/80)    Past Medical History:   Diagnosis Date    CLL (chronic lymphocytic leukemia) (HCC) 2004    Hyperlipidemia     Hypertension     Hypothyroidism     Leukocytosis     MRSA (methicillin resistant Staphylococcus aureus) infection     Thrombocytopenia (HCC)       Past Surgical History:   Procedure Laterality Date    BREAST BIOPSY Right     COLONOSCOPY      DILATION AND CURETTAGE OF UTERUS  05/17/2022    DILATATION AND CURETTAGE HYSTEROSCOPY MYOSURE WITH EXAM UNDER ANESTHESIA AND CERVICAL BIOPSY    DILATION AND CURETTAGE OF UTERUS N/A 05/17/2022

## 2024-04-09 ENCOUNTER — HOSPITAL ENCOUNTER (OUTPATIENT)
Age: 79
Discharge: HOME | End: 2024-04-09
Payer: MEDICARE

## 2024-04-09 DIAGNOSIS — Z00.00 ANNUAL PHYSICAL EXAM: ICD-10-CM

## 2024-04-09 DIAGNOSIS — Z13.220 ENCOUNTER FOR LIPID SCREENING FOR CARDIOVASCULAR DISEASE: ICD-10-CM

## 2024-04-09 DIAGNOSIS — Z13.6 ENCOUNTER FOR LIPID SCREENING FOR CARDIOVASCULAR DISEASE: ICD-10-CM

## 2024-04-09 DIAGNOSIS — E03.9 HYPOTHYROIDISM, UNSPECIFIED TYPE: ICD-10-CM

## 2024-04-09 LAB
ALBUMIN SERPL-MCNC: 4.3 G/DL (ref 3.5–5.2)
ALBUMIN/GLOB SERPL: 2.2 {RATIO} (ref 1–2.5)
ALP SERPL-CCNC: 64 U/L (ref 35–104)
ALT SERPL-CCNC: 6 U/L (ref 5–33)
ANION GAP SERPL CALCULATED.3IONS-SCNC: 8 MMOL/L (ref 9–17)
AST SERPL-CCNC: 17 U/L
BILIRUB DIRECT SERPL-MCNC: 0.2 MG/DL
BILIRUB INDIRECT SERPL-MCNC: 0.4 MG/DL (ref 0–1)
BILIRUB SERPL-MCNC: 0.6 MG/DL (ref 0.3–1.2)
BUN SERPL-MCNC: 12 MG/DL (ref 8–23)
CALCIUM SERPL-MCNC: 9.4 MG/DL (ref 8.6–10.4)
CHLORIDE SERPL-SCNC: 100 MMOL/L (ref 98–107)
CHOLEST SERPL-MCNC: 140 MG/DL (ref 0–199)
CHOLESTEROL/HDL RATIO: 3
CO2 SERPL-SCNC: 28 MMOL/L (ref 20–31)
CREAT SERPL-MCNC: 0.8 MG/DL (ref 0.5–0.9)
GFR SERPL CREATININE-BSD FRML MDRD: 74 ML/MIN/1.73M2
GLUCOSE P FAST SERPL-MCNC: 113 MG/DL (ref 70–99)
HDLC SERPL-MCNC: 48 MG/DL
LDLC SERPL CALC-MCNC: 78 MG/DL (ref 0–100)
POTASSIUM SERPL-SCNC: 4.6 MMOL/L (ref 3.7–5.3)
PROT SERPL-MCNC: 6.3 G/DL (ref 6.4–8.3)
SODIUM SERPL-SCNC: 136 MMOL/L (ref 135–144)
T4 FREE SERPL-MCNC: 1.8 NG/DL (ref 0.92–1.68)
TRIGL SERPL-MCNC: 68 MG/DL (ref 0–149)
TSH SERPL DL<=0.05 MIU/L-ACNC: 5.12 UIU/ML (ref 0.3–5)
VLDLC SERPL CALC-MCNC: 14 MG/DL

## 2024-04-09 PROCEDURE — 80048 BASIC METABOLIC PNL TOTAL CA: CPT

## 2024-04-09 PROCEDURE — 80076 HEPATIC FUNCTION PANEL: CPT

## 2024-04-09 PROCEDURE — 84443 ASSAY THYROID STIM HORMONE: CPT

## 2024-04-09 PROCEDURE — 36415 COLL VENOUS BLD VENIPUNCTURE: CPT

## 2024-04-09 PROCEDURE — 80061 LIPID PANEL: CPT

## 2024-04-09 PROCEDURE — 84439 ASSAY OF FREE THYROXINE: CPT

## 2024-04-10 ENCOUNTER — OFFICE VISIT (OUTPATIENT)
Dept: ONCOLOGY | Age: 79
End: 2024-04-10
Payer: MEDICARE

## 2024-04-10 VITALS
SYSTOLIC BLOOD PRESSURE: 174 MMHG | WEIGHT: 145.5 LBS | RESPIRATION RATE: 14 BRPM | HEART RATE: 69 BPM | BODY MASS INDEX: 26.61 KG/M2 | DIASTOLIC BLOOD PRESSURE: 82 MMHG | TEMPERATURE: 96.3 F

## 2024-04-10 DIAGNOSIS — C91.10 CLL (CHRONIC LYMPHOCYTIC LEUKEMIA) (HCC): Primary | ICD-10-CM

## 2024-04-10 DIAGNOSIS — D69.6 THROMBOCYTOPENIA (HCC): ICD-10-CM

## 2024-04-10 DIAGNOSIS — E03.9 HYPOTHYROIDISM, UNSPECIFIED TYPE: ICD-10-CM

## 2024-04-10 DIAGNOSIS — E78.2 MIXED HYPERLIPIDEMIA: ICD-10-CM

## 2024-04-10 DIAGNOSIS — R16.1 SPLENOMEGALY: ICD-10-CM

## 2024-04-10 PROCEDURE — 1036F TOBACCO NON-USER: CPT | Performed by: INTERNAL MEDICINE

## 2024-04-10 PROCEDURE — 1123F ACP DISCUSS/DSCN MKR DOCD: CPT | Performed by: INTERNAL MEDICINE

## 2024-04-10 PROCEDURE — 99214 OFFICE O/P EST MOD 30 MIN: CPT | Performed by: INTERNAL MEDICINE

## 2024-04-10 PROCEDURE — 99211 OFF/OP EST MAY X REQ PHY/QHP: CPT | Performed by: INTERNAL MEDICINE

## 2024-04-10 PROCEDURE — 1090F PRES/ABSN URINE INCON ASSESS: CPT | Performed by: INTERNAL MEDICINE

## 2024-04-10 PROCEDURE — G8400 PT W/DXA NO RESULTS DOC: HCPCS | Performed by: INTERNAL MEDICINE

## 2024-04-10 PROCEDURE — G8417 CALC BMI ABV UP PARAM F/U: HCPCS | Performed by: INTERNAL MEDICINE

## 2024-04-10 PROCEDURE — 3077F SYST BP >= 140 MM HG: CPT | Performed by: INTERNAL MEDICINE

## 2024-04-10 PROCEDURE — G8427 DOCREV CUR MEDS BY ELIG CLIN: HCPCS | Performed by: INTERNAL MEDICINE

## 2024-04-10 PROCEDURE — 3079F DIAST BP 80-89 MM HG: CPT | Performed by: INTERNAL MEDICINE

## 2024-04-10 RX ORDER — LEVOTHYROXINE SODIUM 88 UG/1
88 TABLET ORAL DAILY
Qty: 90 TABLET | Refills: 3 | Status: SHIPPED | OUTPATIENT
Start: 2024-04-10

## 2024-04-10 NOTE — PROGRESS NOTES
S2, no murmurs  Abdomen - soft, nontender, nondistended, no masses or organomegaly.    Neurological - alert, oriented, normal speech, no focal findings or movement disorder noted  Extremities - peripheral pulses normal, no pedal edema, no clubbing or cyanosis  Skin -no rash or suspicious lesion noted    LABS:       Results for orders placed or performed during the hospital encounter of 04/09/24   TSH   Result Value Ref Range    TSH 5.12 (H) 0.30 - 5.00 uIU/mL   T4, Free   Result Value Ref Range    T4 Free 1.8 (H) 0.92 - 1.68 ng/dL   Basic Metabolic Panel, Fasting   Result Value Ref Range    Sodium 136 135 - 144 mmol/L    Potassium 4.6 3.7 - 5.3 mmol/L    Chloride 100 98 - 107 mmol/L    CO2 28 20 - 31 mmol/L    Anion Gap 8 (L) 9 - 17 mmol/L    Glucose, Fasting 113 (H) 70 - 99 mg/dL    BUN 12 8 - 23 mg/dL    Creatinine 0.8 0.5 - 0.9 mg/dL    Est, Glom Filt Rate 74 >60 mL/min/1.73m2    Calcium 9.4 8.6 - 10.4 mg/dL   Hepatic Function Panel   Result Value Ref Range    Albumin 4.3 3.5 - 5.2 g/dL    Alkaline Phosphatase 64 35 - 104 U/L    ALT 6 5 - 33 U/L    AST 17 <32 U/L    Total Bilirubin 0.6 0.3 - 1.2 mg/dL    Bilirubin, Direct 0.2 <0.3 mg/dL    Bilirubin, Indirect 0.4 0.0 - 1.0 mg/dL    Total Protein 6.3 (L) 6.4 - 8.3 g/dL    Albumin/Globulin Ratio 2.2 1.0 - 2.5   Lipid, Fasting   Result Value Ref Range    Cholesterol, Fasting 140 0 - 199 mg/dL    HDL 48 >40 mg/dL    LDL Cholesterol 78 0 - 100 mg/dL    Chol/HDL Ratio 3.0     Triglyceride, Fasting 68 0 - 149 mg/dL    VLDL 14 mg/dL     IMPRESSION:     1. CLL (chronic lymphocytic leukemia) (HCC)    Thrombocytopenia  Hypogammaglobulinemia    Patient Active Problem List   Diagnosis    Mixed hyperlipidemia    Hypothyroidism    Essential hypertension    CLL (chronic lymphocytic leukemia) (HCC)    History of MRSA infection    Vaginal & cervical stenosis    Abnormal CT scan, pelvis       PLAN:   Personally reviewed results of lab work-up, imaging studies and clinical

## 2024-04-11 ENCOUNTER — TELEPHONE (OUTPATIENT)
Dept: ONCOLOGY | Age: 79
End: 2024-04-11

## 2024-04-11 RX ORDER — ROSUVASTATIN CALCIUM 5 MG/1
5 TABLET, COATED ORAL DAILY
Qty: 90 TABLET | Refills: 2 | Status: SHIPPED | OUTPATIENT
Start: 2024-04-11

## 2024-04-11 NOTE — TELEPHONE ENCOUNTER
Rv in 4 months with labs 1 week before rv         Patient was scheduled on 8/7 to go over lab work. Gave patient orders to have done 1 week before her appointment.       Gave patient AVS and schedule

## 2024-04-26 ENCOUNTER — TELEPHONE (OUTPATIENT)
Dept: PRIMARY CARE CLINIC | Age: 79
End: 2024-04-26

## 2024-04-26 NOTE — TELEPHONE ENCOUNTER
Patient states this morning she used a vaginal replense applicator like she does often, and she must've knicked something because now she has a clear bloody discharge, and moving makes it worse. Spoke with Dr. Lovell who stated patient can use topical antibiotic on tampon and insert to help heal if just little bit of clear blood, however if there is a lot of blood flowing out of her she needs to go to ED and may need to be stitched. Patient notified and states she will try the tampon with antibiotic ointment since it is not bleeding bad right now, will go to ED if gets worse.

## 2024-04-30 ENCOUNTER — OFFICE VISIT (OUTPATIENT)
Dept: PRIMARY CARE CLINIC | Age: 79
End: 2024-04-30
Payer: MEDICARE

## 2024-04-30 VITALS
WEIGHT: 142.5 LBS | BODY MASS INDEX: 26.22 KG/M2 | DIASTOLIC BLOOD PRESSURE: 88 MMHG | SYSTOLIC BLOOD PRESSURE: 136 MMHG | OXYGEN SATURATION: 97 % | HEIGHT: 62 IN | HEART RATE: 68 BPM

## 2024-04-30 DIAGNOSIS — E03.9 HYPOTHYROIDISM, UNSPECIFIED TYPE: ICD-10-CM

## 2024-04-30 DIAGNOSIS — I10 ESSENTIAL HYPERTENSION: Primary | ICD-10-CM

## 2024-04-30 PROCEDURE — 3075F SYST BP GE 130 - 139MM HG: CPT | Performed by: NURSE PRACTITIONER

## 2024-04-30 PROCEDURE — 99213 OFFICE O/P EST LOW 20 MIN: CPT | Performed by: NURSE PRACTITIONER

## 2024-04-30 PROCEDURE — 1123F ACP DISCUSS/DSCN MKR DOCD: CPT | Performed by: NURSE PRACTITIONER

## 2024-04-30 PROCEDURE — 1036F TOBACCO NON-USER: CPT | Performed by: NURSE PRACTITIONER

## 2024-04-30 PROCEDURE — 1090F PRES/ABSN URINE INCON ASSESS: CPT | Performed by: NURSE PRACTITIONER

## 2024-04-30 PROCEDURE — G8427 DOCREV CUR MEDS BY ELIG CLIN: HCPCS | Performed by: NURSE PRACTITIONER

## 2024-04-30 PROCEDURE — G8400 PT W/DXA NO RESULTS DOC: HCPCS | Performed by: NURSE PRACTITIONER

## 2024-04-30 PROCEDURE — G8417 CALC BMI ABV UP PARAM F/U: HCPCS | Performed by: NURSE PRACTITIONER

## 2024-04-30 PROCEDURE — 3079F DIAST BP 80-89 MM HG: CPT | Performed by: NURSE PRACTITIONER

## 2024-04-30 ASSESSMENT — ENCOUNTER SYMPTOMS
RHINORRHEA: 0
WHEEZING: 0
EYE DISCHARGE: 0
SHORTNESS OF BREATH: 0
SORE THROAT: 0
NAUSEA: 0
EYE REDNESS: 0
VOMITING: 0
DIARRHEA: 0
COUGH: 0
ABDOMINAL PAIN: 0

## 2024-04-30 NOTE — PROGRESS NOTES
[facility]  [facility] PRIMARY CARE  [address] [place] [address]  Dept: [phone]    Sandi Lima is a 78 y.o. female Established patient, who presents today for her medical conditions/complaints as noted below.      Chief Complaint   Patient presents with    Hypertension    Blood Work       HPI:     Hypertension  This is a chronic problem. The current episode started more than 1 year ago. The problem is unchanged. The problem is controlled. Associated symptoms include anxiety. Pertinent negatives include no chest pain, headaches, palpitations, peripheral edema or shortness of breath. Risk factors for coronary artery disease include post-menopausal state and dyslipidemia. Past treatments include ACE inhibitors and beta blockers. There are no compliance problems.       Lab work is looks OK.  She has more lab work ordered by Dr. Braxton, oncology.    She monitors blood pressure and it is OK.  She is learning to take a break in the afternoon.  She feels a lot of her blood pressure is being reactive instead of proactive.    She expresses no concerns.  She has to learn that how she responds is up to her.     She does some sort of exercise every day.    Reviewed prior notes: Previous PCP and oncology    Reviewed previous:  Labs    LDL Cholesterol (mg/dL)   Date Value   04/09/2024 78   01/18/2023 93   11/04/2021 146 (H)       (goal LDL is <100)   AST (U/L)   Date Value   04/09/2024 17     ALT (U/L)   Date Value   04/09/2024 6     BUN (mg/dL)   Date Value   04/09/2024 12     Hemoglobin A1C (%)   Date Value   02/15/2022 5.9     TSH (uIU/mL)   Date Value   04/09/2024 5.12 (H)     BP Readings from Last 3 Encounters:   04/30/24 136/88   04/10/24 (!) 174/82   03/29/24 (!) 174/92          (goal 120/80)    Past Medical History:   Diagnosis Date    CLL (chronic lymphocytic leukemia) (HCC) 2004    Hyperlipidemia     Hypertension     Hypothyroidism     Leukocytosis     MRSA (methicillin

## 2024-07-29 ENCOUNTER — TELEPHONE (OUTPATIENT)
Dept: ONCOLOGY | Age: 79
End: 2024-07-29

## 2024-07-30 ENCOUNTER — HOSPITAL ENCOUNTER (OUTPATIENT)
Age: 79
Discharge: HOME | End: 2024-07-30
Payer: MEDICARE

## 2024-07-30 DIAGNOSIS — C91.10 CLL (CHRONIC LYMPHOCYTIC LEUKEMIA) (HCC): ICD-10-CM

## 2024-07-30 DIAGNOSIS — D69.6 THROMBOCYTOPENIA (HCC): ICD-10-CM

## 2024-07-30 DIAGNOSIS — R16.1 SPLENOMEGALY: ICD-10-CM

## 2024-07-30 LAB
ALBUMIN SERPL-MCNC: 4.6 G/DL (ref 3.5–5.2)
ALBUMIN/GLOB SERPL: 2.3 {RATIO} (ref 1–2.5)
ALP SERPL-CCNC: 87 U/L (ref 35–104)
ALT SERPL-CCNC: 11 U/L (ref 5–33)
ANION GAP SERPL CALCULATED.3IONS-SCNC: 8 MMOL/L (ref 9–17)
AST SERPL-CCNC: 26 U/L
BASOPHILS # BLD: 0 K/UL (ref 0–0.2)
BASOPHILS NFR BLD: 0 % (ref 0–2)
BILIRUB SERPL-MCNC: 0.6 MG/DL (ref 0.3–1.2)
BUN SERPL-MCNC: 13 MG/DL (ref 8–23)
CALCIUM SERPL-MCNC: 9.6 MG/DL (ref 8.6–10.4)
CHLORIDE SERPL-SCNC: 104 MMOL/L (ref 98–107)
CO2 SERPL-SCNC: 30 MMOL/L (ref 20–31)
CREAT SERPL-MCNC: 0.7 MG/DL (ref 0.5–0.9)
EOSINOPHIL # BLD: 0 K/UL (ref 0–0.4)
EOSINOPHILS RELATIVE PERCENT: 0 % (ref 1–4)
ERYTHROCYTE [DISTWIDTH] IN BLOOD BY AUTOMATED COUNT: 14.1 % (ref 12.5–15.4)
GFR, ESTIMATED: 87 ML/MIN/1.73M2
GLUCOSE SERPL-MCNC: 98 MG/DL (ref 70–99)
HCT VFR BLD AUTO: 41.2 % (ref 36–46)
HGB BLD-MCNC: 13.5 G/DL (ref 12–16)
LDH SERPL-CCNC: 236 U/L (ref 135–214)
LYMPHOCYTES NFR BLD: 20.66 K/UL (ref 1–4.8)
LYMPHOCYTES RELATIVE PERCENT: 83 % (ref 24–44)
MCH RBC QN AUTO: 32.2 PG (ref 26–34)
MCHC RBC AUTO-ENTMCNC: 32.6 G/DL (ref 31–37)
MCV RBC AUTO: 98.7 FL (ref 80–100)
MONOCYTES NFR BLD: 0.5 K/UL (ref 0.1–0.8)
MONOCYTES NFR BLD: 2 % (ref 1–7)
MORPHOLOGY: NORMAL
NEUTROPHILS NFR BLD: 15 % (ref 36–66)
NEUTS SEG NFR BLD: 3.74 K/UL (ref 1.8–7.7)
PLATELET # BLD AUTO: 113 K/UL (ref 140–450)
PMV BLD AUTO: 7.1 FL (ref 6–12)
POTASSIUM SERPL-SCNC: 5.2 MMOL/L (ref 3.7–5.3)
PROT SERPL-MCNC: 6.6 G/DL (ref 6.4–8.3)
RBC # BLD AUTO: 4.18 M/UL (ref 4–5.2)
SODIUM SERPL-SCNC: 142 MMOL/L (ref 135–144)
WBC OTHER # BLD: 24.9 K/UL (ref 3.5–11)

## 2024-07-30 PROCEDURE — 80053 COMPREHEN METABOLIC PANEL: CPT

## 2024-07-30 PROCEDURE — 85025 COMPLETE CBC W/AUTO DIFF WBC: CPT

## 2024-07-30 PROCEDURE — 83615 LACTATE (LD) (LDH) ENZYME: CPT

## 2024-07-30 PROCEDURE — 36415 COLL VENOUS BLD VENIPUNCTURE: CPT

## 2024-08-07 ENCOUNTER — TELEPHONE (OUTPATIENT)
Dept: ONCOLOGY | Age: 79
End: 2024-08-07

## 2024-08-07 ENCOUNTER — OFFICE VISIT (OUTPATIENT)
Dept: ONCOLOGY | Age: 79
End: 2024-08-07
Payer: MEDICARE

## 2024-08-07 VITALS
DIASTOLIC BLOOD PRESSURE: 84 MMHG | WEIGHT: 143.6 LBS | OXYGEN SATURATION: 98 % | RESPIRATION RATE: 14 BRPM | SYSTOLIC BLOOD PRESSURE: 144 MMHG | TEMPERATURE: 96.8 F | BODY MASS INDEX: 26.26 KG/M2

## 2024-08-07 DIAGNOSIS — C91.10 CLL (CHRONIC LYMPHOCYTIC LEUKEMIA) (HCC): Primary | ICD-10-CM

## 2024-08-07 DIAGNOSIS — R16.1 SPLENOMEGALY: ICD-10-CM

## 2024-08-07 DIAGNOSIS — D69.6 THROMBOCYTOPENIA (HCC): ICD-10-CM

## 2024-08-07 PROCEDURE — 3079F DIAST BP 80-89 MM HG: CPT | Performed by: INTERNAL MEDICINE

## 2024-08-07 PROCEDURE — G8400 PT W/DXA NO RESULTS DOC: HCPCS | Performed by: INTERNAL MEDICINE

## 2024-08-07 PROCEDURE — G8417 CALC BMI ABV UP PARAM F/U: HCPCS | Performed by: INTERNAL MEDICINE

## 2024-08-07 PROCEDURE — 99214 OFFICE O/P EST MOD 30 MIN: CPT | Performed by: INTERNAL MEDICINE

## 2024-08-07 PROCEDURE — 99211 OFF/OP EST MAY X REQ PHY/QHP: CPT | Performed by: INTERNAL MEDICINE

## 2024-08-07 PROCEDURE — 1090F PRES/ABSN URINE INCON ASSESS: CPT | Performed by: INTERNAL MEDICINE

## 2024-08-07 PROCEDURE — 3077F SYST BP >= 140 MM HG: CPT | Performed by: INTERNAL MEDICINE

## 2024-08-07 PROCEDURE — 1036F TOBACCO NON-USER: CPT | Performed by: INTERNAL MEDICINE

## 2024-08-07 PROCEDURE — G8427 DOCREV CUR MEDS BY ELIG CLIN: HCPCS | Performed by: INTERNAL MEDICINE

## 2024-08-07 PROCEDURE — 1123F ACP DISCUSS/DSCN MKR DOCD: CPT | Performed by: INTERNAL MEDICINE

## 2024-08-07 NOTE — TELEPHONE ENCOUNTER
FRANCES HERE FOR FOLLOW UP   Rv in 4 months with labs before rv   LABS ORDERED: CBC CMP [facility]   MD VISIT: 1/15/25 @ 11:15AM   (PT WANTED AFTER THE HOLIDAY)   LABS PRINTED AND GIVEN ON EXIT   AVS PRINTED AND GIVEN ON EXIT

## 2024-08-07 NOTE — PROGRESS NOTES
ONCOLOGY NOTE    PCP: Blane Nolan, APRN - CNP  Referring Provider: No ref. provider found     DIAGNOSIS:   CLL    CURRENT TREATMENT  Active surveillance    BRIEF CASE HISTORY:   Sandi Lima is a very pleasant 74 y.o. female who is presenting for follow-up for CLL.  She was referred to me in 02/2020 for management of CLL/SLL which was diagnosed around September 2004.  She had been living in [place], [place] for quite some time. She moved to [facility] in early 2019.  She had MRSA infection in 2016.     INTERIM HISTORY:  Patient presents to the clinic for a follow-up visit and will discuss results of lab workup.  Clinically patient continues to do well.  Denies swollen glands drenching night sweats or excessive fatigue.  Weight is stable.  WBC count remains elevated.  Platelet count remains low but has improved    During this visit patient's allergy, social, medical, surgical history and medications were reviewed and updated.    PAST MEDICAL HISTORY:      Diagnosis Date    CLL (chronic lymphocytic leukemia) (HCC) 2004    Hyperlipidemia     Hypertension     Hypothyroidism     Leukocytosis     MRSA (methicillin resistant Staphylococcus aureus) infection     Thrombocytopenia (HCC)        PAST SURGICAL HISTORY:      Procedure Laterality Date    BREAST BIOPSY Right     COLONOSCOPY      DILATION AND CURETTAGE OF UTERUS  05/17/2022    DILATATION AND CURETTAGE HYSTEROSCOPY MYOSURE WITH EXAM UNDER ANESTHESIA AND CERVICAL BIOPSY    DILATION AND CURETTAGE OF UTERUS N/A 05/17/2022    DILATATION AND CURETTAGE HYSTEROSCOPY MYOSURE WITH EXAM UNDER ANESTHESIA AND CERVICAL BIOPSY performed by Jovany Berman DO at [facility] OR    HYSTEROSCOPY  05/17/2022    TONSILLECTOMY      TUBAL LIGATION      UPPER GASTROINTESTINAL ENDOSCOPY         CURRENT MEDICATIONS:   Current Outpatient Medications   Medication Sig Dispense Refill    rosuvastatin (CRESTOR) 5 MG tablet Take 1 tablet by mouth daily 90 tablet 2

## 2024-10-01 ENCOUNTER — TELEPHONE (OUTPATIENT)
Dept: PRIMARY CARE CLINIC | Age: 79
End: 2024-10-01

## 2024-10-01 DIAGNOSIS — E03.9 HYPOTHYROIDISM, UNSPECIFIED TYPE: Primary | ICD-10-CM

## 2024-10-01 NOTE — TELEPHONE ENCOUNTER
----- Message from Andreina POP sent at 10/1/2024  2:02 PM EDT -----  Regarding: [facility] Referral Request  [facility] Referral Request    Reason for referral request: Lab/Test Order    Specialist/Lab/Test patient is requesting (if known): Blood work     Specialist Phone Number (if applicable):    Additional Information Patient wants to know if the order for her blood work have been released since her Physical will be by October 30   --------------------------------------------------------------------------------------------------------------------------    Relationship to Patient: Self     Call Back Information: OK to leave message on voicemail  Preferred Call Back Number: Phone  [phone]

## 2024-10-01 NOTE — TELEPHONE ENCOUNTER
I ordered a thyroid-stimulating hormone and free T4 lab.  Cholesterol does not need to be repeated yet and other labs have been ordered by Dr. Braxton and we can review it when it is completed.

## 2024-10-22 ENCOUNTER — HOSPITAL ENCOUNTER (OUTPATIENT)
Age: 79
Discharge: HOME | End: 2024-10-22
Payer: MEDICARE

## 2024-10-22 DIAGNOSIS — E03.9 HYPOTHYROIDISM, UNSPECIFIED TYPE: ICD-10-CM

## 2024-10-22 LAB
T4 FREE SERPL-MCNC: 1.8 NG/DL (ref 0.92–1.68)
TSH SERPL DL<=0.05 MIU/L-ACNC: 1.43 UIU/ML (ref 0.3–5)

## 2024-10-22 PROCEDURE — 84439 ASSAY OF FREE THYROXINE: CPT

## 2024-10-22 PROCEDURE — 84443 ASSAY THYROID STIM HORMONE: CPT

## 2024-10-22 PROCEDURE — 36415 COLL VENOUS BLD VENIPUNCTURE: CPT

## 2024-10-27 SDOH — HEALTH STABILITY: PHYSICAL HEALTH: ON AVERAGE, HOW MANY MINUTES DO YOU ENGAGE IN EXERCISE AT THIS LEVEL?: 60 MIN

## 2024-10-27 SDOH — HEALTH STABILITY: PHYSICAL HEALTH: ON AVERAGE, HOW MANY DAYS PER WEEK DO YOU ENGAGE IN MODERATE TO STRENUOUS EXERCISE (LIKE A BRISK WALK)?: 5 DAYS

## 2024-10-27 ASSESSMENT — LIFESTYLE VARIABLES
HOW MANY STANDARD DRINKS CONTAINING ALCOHOL DO YOU HAVE ON A TYPICAL DAY: 1 OR 2
HOW OFTEN DO YOU HAVE SIX OR MORE DRINKS ON ONE OCCASION: 1
HOW OFTEN DO YOU HAVE A DRINK CONTAINING ALCOHOL: 2
HOW MANY STANDARD DRINKS CONTAINING ALCOHOL DO YOU HAVE ON A TYPICAL DAY: 1
HOW OFTEN DO YOU HAVE A DRINK CONTAINING ALCOHOL: MONTHLY OR LESS

## 2024-10-27 ASSESSMENT — PATIENT HEALTH QUESTIONNAIRE - PHQ9
SUM OF ALL RESPONSES TO PHQ QUESTIONS 1-9: 0
SUM OF ALL RESPONSES TO PHQ QUESTIONS 1-9: 0
2. FEELING DOWN, DEPRESSED OR HOPELESS: NOT AT ALL
1. LITTLE INTEREST OR PLEASURE IN DOING THINGS: NOT AT ALL
SUM OF ALL RESPONSES TO PHQ QUESTIONS 1-9: 0
SUM OF ALL RESPONSES TO PHQ QUESTIONS 1-9: 0
SUM OF ALL RESPONSES TO PHQ9 QUESTIONS 1 & 2: 0

## 2024-10-30 ENCOUNTER — OFFICE VISIT (OUTPATIENT)
Dept: PRIMARY CARE CLINIC | Age: 79
End: 2024-10-30

## 2024-10-30 VITALS
HEIGHT: 62 IN | SYSTOLIC BLOOD PRESSURE: 132 MMHG | OXYGEN SATURATION: 99 % | WEIGHT: 143.5 LBS | HEART RATE: 72 BPM | DIASTOLIC BLOOD PRESSURE: 82 MMHG | BODY MASS INDEX: 26.41 KG/M2

## 2024-10-30 DIAGNOSIS — E03.9 HYPOTHYROIDISM, UNSPECIFIED TYPE: ICD-10-CM

## 2024-10-30 DIAGNOSIS — Z23 NEED FOR VACCINATION: ICD-10-CM

## 2024-10-30 DIAGNOSIS — I10 ESSENTIAL HYPERTENSION: ICD-10-CM

## 2024-10-30 DIAGNOSIS — Z00.00 MEDICARE ANNUAL WELLNESS VISIT, SUBSEQUENT: Primary | ICD-10-CM

## 2024-10-30 NOTE — PROGRESS NOTES
Medicare Annual Wellness Visit    Sandi Lima is here for Medicare AWV    Assessment & Plan   Medicare annual wellness visit, subsequent  Need for vaccination  -     Pneumococcal, PCV20, PREVNAR 20, (age 6w+), IM, PF  Essential hypertension  Hypothyroidism, unspecified type  Recommendations for Preventive Services Due: see orders and patient instructions/AVS.  Recommended screening schedule for the next 5-10 years is provided to the patient in written form: see Patient Instructions/AVS.     Return in 6 months (on 4/30/2025) for HTN, Hypothyroidism.     Subjective   The following acute and/or chronic problems were also addressed today:  She is seeing Dr. Braxton for CLL    She had yearly dermatology check up and skin is okay.    She monitors blood pressure at home. She checks it on regular basis.   In the morning her blood pressure is a little higher than later in the day and is generally okay.     She is still doing Yoga, walking and golfing.    Patient's complete Health Risk Assessment and screening values have been reviewed and are found in Flowsheets. The following problems were reviewed today and where indicated follow up appointments were made and/or referrals ordered.    No Positive Risk Factors identified today.                                Objective   Vitals:    10/30/24 1056   BP: 132/82   Pulse: 72   SpO2: 99%   Weight: 65.1 kg (143 lb 8 oz)   Height: 1.575 m (5' 2.01\")      Body mass index is 26.24 kg/m².        General Appearance: alert and oriented to person, place and time, well-developed and well-nourished, in no acute distress  Skin: warm and dry, no rash or erythema  Head: normocephalic and atraumatic  Eyes: pupils equal, round, and reactive to light, extraocular eye movements intact, conjunctivae normal  ENT: tympanic membrane, external ear and ear canal normal bilaterally, oropharynx clear and moist with normal mucous membranes, hearing grossly normal bilaterally, sinuses non-tender, and

## 2024-10-30 NOTE — PATIENT INSTRUCTIONS
to screen for glaucoma; cataracts, macular degeneration, and other eye disorders.  A preventive dental visit is recommended every 6 months.  Try to get at least 150 minutes of exercise per week or 10,000 steps per day on a pedometer .  Order or download the FREE \"Exercise & Physical Activity: Your Everyday Guide\" from [employer]. Call 1-471.434.2711 or search [employer] online.  You need 3886-9347 mg of calcium and 9356-5804 IU of vitamin D per day. It is possible to meet your calcium requirement with diet alone, but a vitamin D supplement is usually necessary to meet this goal.  When exposed to the sun, use a sunscreen that protects against both UVA and UVB radiation with an SPF of 30 or greater. Reapply every 2 to 3 hours or after sweating, drying off with a towel, or swimming.  Always wear a seat belt when traveling in a car. Always wear a helmet when riding a bicycle or motorcycle.

## 2025-01-06 ENCOUNTER — TELEPHONE (OUTPATIENT)
Dept: PRIMARY CARE CLINIC | Age: 80
End: 2025-01-06

## 2025-01-06 NOTE — TELEPHONE ENCOUNTER
Patient having cough, runny nose and headache. Asking for an appointment. No open appointments until 1/30/25

## 2025-01-07 ENCOUNTER — OFFICE VISIT (OUTPATIENT)
Dept: PRIMARY CARE CLINIC | Age: 80
End: 2025-01-07

## 2025-01-07 VITALS
TEMPERATURE: 98 F | BODY MASS INDEX: 26.5 KG/M2 | HEART RATE: 74 BPM | HEIGHT: 62 IN | DIASTOLIC BLOOD PRESSURE: 86 MMHG | SYSTOLIC BLOOD PRESSURE: 128 MMHG | OXYGEN SATURATION: 96 % | WEIGHT: 144 LBS

## 2025-01-07 DIAGNOSIS — J06.9 UPPER RESPIRATORY VIRUS: Primary | ICD-10-CM

## 2025-01-07 ASSESSMENT — ENCOUNTER SYMPTOMS
CONSTIPATION: 0
SINUS PRESSURE: 0
DIARRHEA: 0
SORE THROAT: 1
COUGH: 1
SINUS PAIN: 0
SHORTNESS OF BREATH: 0

## 2025-01-07 ASSESSMENT — PATIENT HEALTH QUESTIONNAIRE - PHQ9
SUM OF ALL RESPONSES TO PHQ9 QUESTIONS 1 & 2: 0
SUM OF ALL RESPONSES TO PHQ QUESTIONS 1-9: 0
SUM OF ALL RESPONSES TO PHQ QUESTIONS 1-9: 0
2. FEELING DOWN, DEPRESSED OR HOPELESS: NOT AT ALL
1. LITTLE INTEREST OR PLEASURE IN DOING THINGS: NOT AT ALL
SUM OF ALL RESPONSES TO PHQ QUESTIONS 1-9: 0
SUM OF ALL RESPONSES TO PHQ QUESTIONS 1-9: 0

## 2025-01-07 NOTE — PROGRESS NOTES
[facility]  [facility] PRIMARY CARE  [address] [facility] [address] [address] [facility] [address]  Dept: [phone]    Sandi Lima is a 79 y.o. female Established patient, who presents today for her medical conditions/complaints as noted below.      Chief Complaint   Patient presents with    Congestion     X3 weeks       HPI:     HPI   She has been sick since flying home from [place] on 12/28.  She states continues to cough.  Cough is not productive.  She feels like it is drainage.  She states sinuses are clear.  She is taking in a lot of fluids.  Cough is getting better.  She is resting but keeping moving.  She takes tylenol as needed for headache.  She does use a nasal spray as needed.  No pressure in ears or pain in hears.    Reviewed prior notes: None   Reviewed previous:        No components found for: \"LDLCHOLESTEROL\", \"LDLCALC\"    (goal LDL is <100)   AST (U/L)   Date Value   07/30/2024 26     ALT (U/L)   Date Value   07/30/2024 11     BUN (mg/dL)   Date Value   07/30/2024 13     Hemoglobin A1C (%)   Date Value   02/15/2022 5.9     TSH (uIU/mL)   Date Value   10/22/2024 1.43     BP Readings from Last 3 Encounters:   01/07/25 128/86   10/30/24 132/82   08/07/24 (!) 144/84          (goal 120/80)    Past Medical History:   Diagnosis Date    CLL (chronic lymphocytic leukemia) (HCC) 2004    Hyperlipidemia     Hypertension     Hypothyroidism     Leukocytosis     MRSA (methicillin resistant Staphylococcus aureus) infection     Thrombocytopenia (HCC)       Past Surgical History:   Procedure Laterality Date    BREAST BIOPSY Right     COLONOSCOPY      DILATION AND CURETTAGE OF UTERUS  05/17/2022    DILATATION AND CURETTAGE HYSTEROSCOPY MYOSURE WITH EXAM UNDER ANESTHESIA AND CERVICAL BIOPSY    DILATION AND CURETTAGE OF UTERUS N/A 05/17/2022    DILATATION AND CURETTAGE HYSTEROSCOPY MYOSURE WITH EXAM UNDER ANESTHESIA AND CERVICAL BIOPSY performed by Jovany Berman DO at [facility] OR    HYSTEROSCOPY

## 2025-01-09 ENCOUNTER — HOSPITAL ENCOUNTER (OUTPATIENT)
Age: 80
Discharge: HOME | End: 2025-01-09
Payer: MEDICARE

## 2025-01-09 DIAGNOSIS — R16.1 SPLENOMEGALY: ICD-10-CM

## 2025-01-09 DIAGNOSIS — C91.10 CLL (CHRONIC LYMPHOCYTIC LEUKEMIA) (HCC): ICD-10-CM

## 2025-01-09 DIAGNOSIS — D69.6 THROMBOCYTOPENIA (HCC): ICD-10-CM

## 2025-01-09 LAB
ALBUMIN SERPL-MCNC: 4.3 G/DL (ref 3.5–5.2)
ALBUMIN/GLOB SERPL: 2.3 {RATIO} (ref 1–2.5)
ALP SERPL-CCNC: 89 U/L (ref 35–104)
ALT SERPL-CCNC: 7 U/L (ref 5–33)
ANION GAP SERPL CALCULATED.3IONS-SCNC: 11 MMOL/L (ref 9–17)
AST SERPL-CCNC: 23 U/L
BASOPHILS # BLD: 0 K/UL (ref 0–0.2)
BASOPHILS NFR BLD: 0 % (ref 0–2)
BILIRUB SERPL-MCNC: 0.6 MG/DL (ref 0.3–1.2)
BUN SERPL-MCNC: 14 MG/DL (ref 8–23)
CALCIUM SERPL-MCNC: 9.3 MG/DL (ref 8.6–10.4)
CHLORIDE SERPL-SCNC: 98 MMOL/L (ref 98–107)
CO2 SERPL-SCNC: 25 MMOL/L (ref 20–31)
CREAT SERPL-MCNC: 0.7 MG/DL (ref 0.5–0.9)
EOSINOPHIL # BLD: 0 K/UL (ref 0–0.4)
EOSINOPHILS RELATIVE PERCENT: 0 % (ref 1–4)
ERYTHROCYTE [DISTWIDTH] IN BLOOD BY AUTOMATED COUNT: 14 % (ref 12.5–15.4)
GFR, ESTIMATED: 87 ML/MIN/1.73M2
GLUCOSE SERPL-MCNC: 96 MG/DL (ref 70–99)
HCT VFR BLD AUTO: 38.9 % (ref 36–46)
HGB BLD-MCNC: 12.7 G/DL (ref 12–16)
LDH SERPL-CCNC: 266 U/L (ref 135–214)
LYMPHOCYTES NFR BLD: 17.78 K/UL (ref 1–4.8)
LYMPHOCYTES RELATIVE PERCENT: 70 % (ref 24–44)
MCH RBC QN AUTO: 31.9 PG (ref 26–34)
MCHC RBC AUTO-ENTMCNC: 32.7 G/DL (ref 31–37)
MCV RBC AUTO: 97.8 FL (ref 80–100)
MONOCYTES NFR BLD: 0.51 K/UL (ref 0.1–0.8)
MONOCYTES NFR BLD: 2 % (ref 1–7)
MORPHOLOGY: ABNORMAL
NEUTROPHILS NFR BLD: 28 % (ref 36–66)
NEUTS SEG NFR BLD: 7.11 K/UL (ref 1.8–7.7)
PLATELET # BLD AUTO: 157 K/UL (ref 140–450)
PMV BLD AUTO: 6.5 FL (ref 6–12)
POTASSIUM SERPL-SCNC: 4.6 MMOL/L (ref 3.7–5.3)
PROT SERPL-MCNC: 6.2 G/DL (ref 6.4–8.3)
RBC # BLD AUTO: 3.98 M/UL (ref 4–5.2)
SODIUM SERPL-SCNC: 134 MMOL/L (ref 135–144)
WBC OTHER # BLD: 25.4 K/UL (ref 3.5–11)

## 2025-01-09 PROCEDURE — 80053 COMPREHEN METABOLIC PANEL: CPT

## 2025-01-09 PROCEDURE — 36415 COLL VENOUS BLD VENIPUNCTURE: CPT

## 2025-01-09 PROCEDURE — 85025 COMPLETE CBC W/AUTO DIFF WBC: CPT

## 2025-01-09 PROCEDURE — 83615 LACTATE (LD) (LDH) ENZYME: CPT

## 2025-01-15 ENCOUNTER — OFFICE VISIT (OUTPATIENT)
Dept: ONCOLOGY | Age: 80
End: 2025-01-15
Payer: MEDICARE

## 2025-01-15 ENCOUNTER — TELEPHONE (OUTPATIENT)
Dept: ONCOLOGY | Age: 80
End: 2025-01-15

## 2025-01-15 VITALS
WEIGHT: 145.3 LBS | BODY MASS INDEX: 26.57 KG/M2 | DIASTOLIC BLOOD PRESSURE: 84 MMHG | SYSTOLIC BLOOD PRESSURE: 160 MMHG | OXYGEN SATURATION: 100 % | RESPIRATION RATE: 18 BRPM | TEMPERATURE: 97.2 F | HEART RATE: 63 BPM

## 2025-01-15 DIAGNOSIS — D80.1 HYPOGAMMAGLOBULINEMIA (HCC): ICD-10-CM

## 2025-01-15 DIAGNOSIS — R16.1 SPLENOMEGALY: ICD-10-CM

## 2025-01-15 DIAGNOSIS — C91.10 CLL (CHRONIC LYMPHOCYTIC LEUKEMIA) (HCC): Primary | ICD-10-CM

## 2025-01-15 PROCEDURE — 1159F MED LIST DOCD IN RCRD: CPT | Performed by: INTERNAL MEDICINE

## 2025-01-15 PROCEDURE — 99214 OFFICE O/P EST MOD 30 MIN: CPT | Performed by: INTERNAL MEDICINE

## 2025-01-15 PROCEDURE — G8400 PT W/DXA NO RESULTS DOC: HCPCS | Performed by: INTERNAL MEDICINE

## 2025-01-15 PROCEDURE — 1036F TOBACCO NON-USER: CPT | Performed by: INTERNAL MEDICINE

## 2025-01-15 PROCEDURE — 99211 OFF/OP EST MAY X REQ PHY/QHP: CPT | Performed by: INTERNAL MEDICINE

## 2025-01-15 PROCEDURE — 1123F ACP DISCUSS/DSCN MKR DOCD: CPT | Performed by: INTERNAL MEDICINE

## 2025-01-15 PROCEDURE — G8427 DOCREV CUR MEDS BY ELIG CLIN: HCPCS | Performed by: INTERNAL MEDICINE

## 2025-01-15 PROCEDURE — 3079F DIAST BP 80-89 MM HG: CPT | Performed by: INTERNAL MEDICINE

## 2025-01-15 PROCEDURE — 1126F AMNT PAIN NOTED NONE PRSNT: CPT | Performed by: INTERNAL MEDICINE

## 2025-01-15 PROCEDURE — 3077F SYST BP >= 140 MM HG: CPT | Performed by: INTERNAL MEDICINE

## 2025-01-15 PROCEDURE — 1090F PRES/ABSN URINE INCON ASSESS: CPT | Performed by: INTERNAL MEDICINE

## 2025-01-15 PROCEDURE — G8417 CALC BMI ABV UP PARAM F/U: HCPCS | Performed by: INTERNAL MEDICINE

## 2025-01-15 NOTE — TELEPHONE ENCOUNTER
Instructions   from Dr. Home Braxton MD    Rv in 4 months with labs 1 week before rv     Rv scheduled on 5/16/2025 @11:15; gave pt a copy of their AVS and their labs

## 2025-01-15 NOTE — PROGRESS NOTES
ONCOLOGY NOTE    PCP: Blane Nolan, APRN - CNP  Referring Provider: No ref. provider found     Chief Complaint   Patient presents with    Follow-up     Review status of disease     Other     Recent Covid infection     Discuss Labs     DIAGNOSIS:   CLL    CURRENT TREATMENT  Active surveillance    BRIEF CASE HISTORY:   Sandi Lima is a very pleasant 74 y.o. female who is presenting for follow-up for CLL.  She was referred to me in 02/2020 for management of CLL/SLL which was diagnosed around September 2004.  She had been living in [place], [place] for quite some time. She moved to [facility] in early 2019.  She had MRSA infection in 2016.     INTERIM HISTORY:  Patient presents to the clinic for a follow-up visit and to discuss also his lab workup and other relevant clinical data.  Overall patient doing well.  Developed a respiratory tract infection in November but has now recovered.  Denies swollen glands denied unintentional weight loss.  Platelet count improved    During this visit patient's allergy, social, medical, surgical history and medications were reviewed and updated.    PAST MEDICAL HISTORY:      Diagnosis Date    CLL (chronic lymphocytic leukemia) (HCC) 2004    Hyperlipidemia     Hypertension     Hypothyroidism     Leukocytosis     MRSA (methicillin resistant Staphylococcus aureus) infection     Thrombocytopenia (HCC)        PAST SURGICAL HISTORY:      Procedure Laterality Date    BREAST BIOPSY Right     COLONOSCOPY      DILATION AND CURETTAGE OF UTERUS  05/17/2022    DILATATION AND CURETTAGE HYSTEROSCOPY MYOSURE WITH EXAM UNDER ANESTHESIA AND CERVICAL BIOPSY    DILATION AND CURETTAGE OF UTERUS N/A 05/17/2022    DILATATION AND CURETTAGE HYSTEROSCOPY MYOSURE WITH EXAM UNDER ANESTHESIA AND CERVICAL BIOPSY performed by Jovany Berman DO at [facility] OR    HYSTEROSCOPY  05/17/2022    TONSILLECTOMY      TUBAL LIGATION      UPPER GASTROINTESTINAL ENDOSCOPY         CURRENT MEDICATIONS:

## 2025-02-07 DIAGNOSIS — I10 ESSENTIAL HYPERTENSION: ICD-10-CM

## 2025-02-10 RX ORDER — METOPROLOL SUCCINATE 25 MG/1
12.5 TABLET, EXTENDED RELEASE ORAL DAILY
Qty: 90 TABLET | Refills: 3 | Status: SHIPPED | OUTPATIENT
Start: 2025-02-10

## 2025-02-10 RX ORDER — RAMIPRIL 5 MG/1
5 CAPSULE ORAL DAILY
Qty: 90 CAPSULE | Refills: 3 | Status: SHIPPED | OUTPATIENT
Start: 2025-02-10

## 2025-03-12 ENCOUNTER — HOSPITAL ENCOUNTER (OUTPATIENT)
Dept: MAMMOGRAPHY | Age: 80
Discharge: HOME | End: 2025-03-14
Payer: MEDICARE

## 2025-03-12 VITALS — WEIGHT: 144 LBS | HEIGHT: 62 IN | BODY MASS INDEX: 26.5 KG/M2

## 2025-03-12 DIAGNOSIS — Z12.31 VISIT FOR SCREENING MAMMOGRAM: ICD-10-CM

## 2025-03-12 PROCEDURE — 77063 BREAST TOMOSYNTHESIS BI: CPT

## 2025-03-13 ENCOUNTER — RESULTS FOLLOW-UP (OUTPATIENT)
Dept: MAMMOGRAPHY | Age: 80
End: 2025-03-13

## 2025-03-21 SDOH — ECONOMIC STABILITY: FOOD INSECURITY: WITHIN THE PAST 12 MONTHS, YOU WORRIED THAT YOUR FOOD WOULD RUN OUT BEFORE YOU GOT MONEY TO BUY MORE.: NEVER TRUE

## 2025-03-21 SDOH — ECONOMIC STABILITY: INCOME INSECURITY: IN THE LAST 12 MONTHS, WAS THERE A TIME WHEN YOU WERE NOT ABLE TO PAY THE MORTGAGE OR RENT ON TIME?: NO

## 2025-03-21 SDOH — ECONOMIC STABILITY: FOOD INSECURITY: WITHIN THE PAST 12 MONTHS, THE FOOD YOU BOUGHT JUST DIDN'T LAST AND YOU DIDN'T HAVE MONEY TO GET MORE.: NEVER TRUE

## 2025-03-21 SDOH — ECONOMIC STABILITY: TRANSPORTATION INSECURITY
IN THE PAST 12 MONTHS, HAS THE LACK OF TRANSPORTATION KEPT YOU FROM MEDICAL APPOINTMENTS OR FROM GETTING MEDICATIONS?: NO

## 2025-03-24 ENCOUNTER — OFFICE VISIT (OUTPATIENT)
Dept: PRIMARY CARE CLINIC | Age: 80
End: 2025-03-24

## 2025-03-24 VITALS
SYSTOLIC BLOOD PRESSURE: 128 MMHG | DIASTOLIC BLOOD PRESSURE: 82 MMHG | HEIGHT: 62 IN | OXYGEN SATURATION: 99 % | BODY MASS INDEX: 27.53 KG/M2 | WEIGHT: 149.6 LBS | HEART RATE: 70 BPM

## 2025-03-24 DIAGNOSIS — M25.551 RIGHT HIP PAIN: ICD-10-CM

## 2025-03-24 DIAGNOSIS — I87.2 VENOUS INSUFFICIENCY: Primary | ICD-10-CM

## 2025-03-24 DIAGNOSIS — R60.0 LOCALIZED EDEMA: ICD-10-CM

## 2025-03-24 DIAGNOSIS — M25.562 ACUTE PAIN OF LEFT KNEE: ICD-10-CM

## 2025-03-24 RX ORDER — FUROSEMIDE 20 MG/1
20 TABLET ORAL DAILY
Qty: 30 TABLET | Refills: 1 | Status: SHIPPED | OUTPATIENT
Start: 2025-03-24

## 2025-03-24 ASSESSMENT — ENCOUNTER SYMPTOMS
ABDOMINAL PAIN: 0
SORE THROAT: 0
NAUSEA: 0
BACK PAIN: 1
SHORTNESS OF BREATH: 0
WHEEZING: 0
COUGH: 0
EYE DISCHARGE: 0
EYE REDNESS: 0
RHINORRHEA: 0
DIARRHEA: 0
VOMITING: 0

## 2025-03-24 NOTE — PATIENT INSTRUCTIONS
Compression stockings on in the morning and off in the evening.    Lasix 20 mg daily    Complete lab work in 2-3 weeks.    Complete x-ray of left  knee and right hip.    Encouraged to walk.

## 2025-03-24 NOTE — PROGRESS NOTES
edema  -     furosemide (LASIX) 20 MG tablet; Take 1 tablet by mouth daily, Disp-30 tablet, R-1Normal  -     Compression Stockings MISC; Starting Mon 3/24/2025, Disp-1 each, R-0, NO PRINT  3. Right hip pain  -     XR HIP RIGHT (2-3 VIEWS); Future  4. Acute pain of left knee  -     XR KNEE LEFT (1-2 VIEWS); Future       Compression stockings on in the morning and off in the evening.    Lasix 20 mg daily    Complete lab work in 2-3 weeks.    Complete x-ray of left  knee and right hip.    Encouraged to walk.    She will get lab work ordered by Dr. Braxton completed next month.     I am patient's PCP and responsible for refilling medications, coordinating care, and reviewing diagnostics    Return in about 5 weeks (around 4/30/2025) for keep appt. to check BP and swelling .  Data Unavailable     Orders Placed This Encounter   Procedures    XR HIP RIGHT (2-3 VIEWS)     Standing Status:   Future     Expected Date:   3/24/2025     Expiration Date:   3/24/2026     Reason for exam::   pain    XR KNEE LEFT (1-2 VIEWS)     Standing Status:   Future     Expected Date:   3/24/2025     Expiration Date:   3/24/2026     Reason for exam::   pain     Orders Placed This Encounter   Medications    furosemide (LASIX) 20 MG tablet     Sig: Take 1 tablet by mouth daily     Dispense:  30 tablet     Refill:  1    Compression Stockings MISC     Sig: by Does not apply route     Dispense:  1 each     Refill:  0       Patient given educational materials - see patient instructions.  Discussed use, benefit, and side effects of prescribed medications.  All patient questions answered. Pt voiced understanding. Reviewed health maintenance.  Instructed to continue current medications, diet and exercise.  Patient agreed with treatment plan. Follow up as directed.     Electronically signed by KIRA Shin CNP on 3/24/2025 at 9:33 AM

## 2025-03-27 ENCOUNTER — HOSPITAL ENCOUNTER (OUTPATIENT)
Dept: GENERAL RADIOLOGY | Age: 80
Discharge: HOME | End: 2025-03-29
Payer: MEDICARE

## 2025-03-27 ENCOUNTER — HOSPITAL ENCOUNTER (OUTPATIENT)
Age: 80
Discharge: HOME | End: 2025-03-29
Payer: MEDICARE

## 2025-03-27 DIAGNOSIS — M25.551 RIGHT HIP PAIN: ICD-10-CM

## 2025-03-27 DIAGNOSIS — M25.562 ACUTE PAIN OF LEFT KNEE: ICD-10-CM

## 2025-03-27 PROCEDURE — 73560 X-RAY EXAM OF KNEE 1 OR 2: CPT

## 2025-03-27 PROCEDURE — 73502 X-RAY EXAM HIP UNI 2-3 VIEWS: CPT

## 2025-03-31 ENCOUNTER — RESULTS FOLLOW-UP (OUTPATIENT)
Dept: PRIMARY CARE CLINIC | Age: 80
End: 2025-03-31

## 2025-04-10 ENCOUNTER — PATIENT MESSAGE (OUTPATIENT)
Dept: PRIMARY CARE CLINIC | Age: 80
End: 2025-04-10

## 2025-04-10 DIAGNOSIS — M25.562 ACUTE PAIN OF LEFT KNEE: ICD-10-CM

## 2025-04-10 DIAGNOSIS — M25.551 RIGHT HIP PAIN: Primary | ICD-10-CM

## 2025-04-11 NOTE — PROGRESS NOTES
I entered an order for physical therapy.   I will try to deliver to [facility] tomorrow afternoon.  Let's see how physical therapy goes but if the spasm continue and/or disrupt therapy you can try a low dose muscle relaxant.

## 2025-04-15 DIAGNOSIS — E03.9 HYPOTHYROIDISM, UNSPECIFIED TYPE: ICD-10-CM

## 2025-04-15 RX ORDER — LEVOTHYROXINE SODIUM 88 UG/1
88 TABLET ORAL DAILY
Qty: 90 TABLET | Refills: 3 | Status: SHIPPED | OUTPATIENT
Start: 2025-04-15

## 2025-04-23 DIAGNOSIS — C91.10 CLL (CHRONIC LYMPHOCYTIC LEUKEMIA) (HCC): ICD-10-CM

## 2025-04-23 DIAGNOSIS — R60.0 LOCALIZED EDEMA: ICD-10-CM

## 2025-04-23 DIAGNOSIS — E78.2 MIXED HYPERLIPIDEMIA: ICD-10-CM

## 2025-04-23 DIAGNOSIS — I10 ESSENTIAL HYPERTENSION: ICD-10-CM

## 2025-04-23 DIAGNOSIS — E03.9 HYPOTHYROIDISM, UNSPECIFIED TYPE: Primary | ICD-10-CM

## 2025-04-24 NOTE — PROGRESS NOTES
Kee Streeter,    Please double up on your furosemide 20 mg tablets. Take 2 tablets for total dose of 40 mg daily until you see me next week. Also if possible increase your potassium intake because if the furosemide is working it may take potassium out of your system also.    Lab work ordered: CBC with diff, Comprehensive metabolic panel, TSH with reflex T4 and Lipid panel. You may go to any [facility] and have them drawn or we can send you the paper work and you can have them drawn anywhere.     Have a good day,  Blane

## 2025-04-25 ENCOUNTER — HOSPITAL ENCOUNTER (OUTPATIENT)
Age: 80
Discharge: HOME | End: 2025-04-25
Payer: MEDICARE

## 2025-04-25 DIAGNOSIS — C91.10 CLL (CHRONIC LYMPHOCYTIC LEUKEMIA) (HCC): ICD-10-CM

## 2025-04-25 DIAGNOSIS — E78.2 MIXED HYPERLIPIDEMIA: ICD-10-CM

## 2025-04-25 DIAGNOSIS — R60.0 LOCALIZED EDEMA: ICD-10-CM

## 2025-04-25 DIAGNOSIS — D80.1 HYPOGAMMAGLOBULINEMIA: ICD-10-CM

## 2025-04-25 DIAGNOSIS — E03.9 HYPOTHYROIDISM, UNSPECIFIED TYPE: ICD-10-CM

## 2025-04-25 DIAGNOSIS — R16.1 SPLENOMEGALY: ICD-10-CM

## 2025-04-25 DIAGNOSIS — I10 ESSENTIAL HYPERTENSION: ICD-10-CM

## 2025-04-25 LAB
ALBUMIN SERPL-MCNC: 4.3 G/DL (ref 3.5–5.2)
ALBUMIN SERPL-MCNC: 4.3 G/DL (ref 3.5–5.2)
ALBUMIN/GLOB SERPL: 1.7 {RATIO} (ref 1–2.5)
ALBUMIN/GLOB SERPL: 1.8 {RATIO} (ref 1–2.5)
ALP SERPL-CCNC: 131 U/L (ref 35–104)
ALP SERPL-CCNC: 131 U/L (ref 35–104)
ALT SERPL-CCNC: 10 U/L (ref 5–33)
ALT SERPL-CCNC: 10 U/L (ref 5–33)
ANION GAP SERPL CALCULATED.3IONS-SCNC: 12 MMOL/L (ref 9–17)
ANION GAP SERPL CALCULATED.3IONS-SCNC: 12 MMOL/L (ref 9–17)
AST SERPL-CCNC: 25 U/L
AST SERPL-CCNC: 25 U/L
BASOPHILS # BLD: 0 K/UL (ref 0–0.2)
BASOPHILS # BLD: 0 K/UL (ref 0–0.2)
BASOPHILS NFR BLD: 0 % (ref 0–2)
BASOPHILS NFR BLD: 0 % (ref 0–2)
BILIRUB SERPL-MCNC: 1.1 MG/DL (ref 0.3–1.2)
BILIRUB SERPL-MCNC: 1.1 MG/DL (ref 0.3–1.2)
BUN SERPL-MCNC: 14 MG/DL (ref 8–23)
BUN SERPL-MCNC: 14 MG/DL (ref 8–23)
CALCIUM SERPL-MCNC: 9.5 MG/DL (ref 8.6–10.4)
CALCIUM SERPL-MCNC: 9.5 MG/DL (ref 8.6–10.4)
CHLORIDE SERPL-SCNC: 92 MMOL/L (ref 98–107)
CHLORIDE SERPL-SCNC: 93 MMOL/L (ref 98–107)
CHOLEST SERPL-MCNC: 142 MG/DL (ref 0–199)
CHOLESTEROL/HDL RATIO: 2.1
CO2 SERPL-SCNC: 26 MMOL/L (ref 20–31)
CO2 SERPL-SCNC: 26 MMOL/L (ref 20–31)
CREAT SERPL-MCNC: 0.8 MG/DL (ref 0.5–0.9)
CREAT SERPL-MCNC: 0.8 MG/DL (ref 0.5–0.9)
EOSINOPHIL # BLD: 0 K/UL (ref 0–0.4)
EOSINOPHIL # BLD: 0 K/UL (ref 0–0.4)
EOSINOPHILS RELATIVE PERCENT: 0 % (ref 1–4)
EOSINOPHILS RELATIVE PERCENT: 0 % (ref 1–4)
ERYTHROCYTE [DISTWIDTH] IN BLOOD BY AUTOMATED COUNT: 13.9 % (ref 12.5–15.4)
ERYTHROCYTE [DISTWIDTH] IN BLOOD BY AUTOMATED COUNT: 13.9 % (ref 12.5–15.4)
GFR, ESTIMATED: 74 ML/MIN/1.73M2
GFR, ESTIMATED: 74 ML/MIN/1.73M2
GLUCOSE P FAST SERPL-MCNC: 134 MG/DL (ref 70–99)
GLUCOSE SERPL-MCNC: 132 MG/DL (ref 70–99)
HCT VFR BLD AUTO: 37.4 % (ref 36–46)
HCT VFR BLD AUTO: 37.4 % (ref 36–46)
HDLC SERPL-MCNC: 69 MG/DL
HGB BLD-MCNC: 12.2 G/DL (ref 12–16)
HGB BLD-MCNC: 12.2 G/DL (ref 12–16)
LDH SERPL-CCNC: 257 U/L (ref 135–214)
LDLC SERPL CALC-MCNC: 60 MG/DL (ref 0–100)
LYMPHOCYTES NFR BLD: 18.15 K/UL (ref 1–4.8)
LYMPHOCYTES NFR BLD: 18.15 K/UL (ref 1–4.8)
LYMPHOCYTES RELATIVE PERCENT: 72 % (ref 24–44)
LYMPHOCYTES RELATIVE PERCENT: 72 % (ref 24–44)
MCH RBC QN AUTO: 31.3 PG (ref 26–34)
MCH RBC QN AUTO: 31.3 PG (ref 26–34)
MCHC RBC AUTO-ENTMCNC: 32.7 G/DL (ref 31–37)
MCHC RBC AUTO-ENTMCNC: 32.7 G/DL (ref 31–37)
MCV RBC AUTO: 95.7 FL (ref 80–100)
MCV RBC AUTO: 95.7 FL (ref 80–100)
MONOCYTES NFR BLD: 0.5 K/UL (ref 0.1–0.8)
MONOCYTES NFR BLD: 0.5 K/UL (ref 0.1–0.8)
MONOCYTES NFR BLD: 2 % (ref 1–7)
MONOCYTES NFR BLD: 2 % (ref 1–7)
MORPHOLOGY: NORMAL
MORPHOLOGY: NORMAL
NEUTROPHILS NFR BLD: 26 % (ref 36–66)
NEUTROPHILS NFR BLD: 26 % (ref 36–66)
NEUTS SEG NFR BLD: 6.55 K/UL (ref 1.8–7.7)
NEUTS SEG NFR BLD: 6.55 K/UL (ref 1.8–7.7)
PLATELET # BLD AUTO: 181 K/UL (ref 140–450)
PLATELET # BLD AUTO: 181 K/UL (ref 140–450)
PMV BLD AUTO: 6.4 FL (ref 6–12)
PMV BLD AUTO: 6.4 FL (ref 6–12)
POTASSIUM SERPL-SCNC: 4 MMOL/L (ref 3.7–5.3)
POTASSIUM SERPL-SCNC: 4.3 MMOL/L (ref 3.7–5.3)
PROT SERPL-MCNC: 6.7 G/DL (ref 6.4–8.3)
PROT SERPL-MCNC: 6.8 G/DL (ref 6.4–8.3)
RBC # BLD AUTO: 3.91 M/UL (ref 4–5.2)
RBC # BLD AUTO: 3.91 M/UL (ref 4–5.2)
SODIUM SERPL-SCNC: 130 MMOL/L (ref 135–144)
SODIUM SERPL-SCNC: 131 MMOL/L (ref 135–144)
T4 FREE SERPL-MCNC: 1.9 NG/DL (ref 0.92–1.68)
TRIGL SERPL-MCNC: 64 MG/DL (ref 0–149)
TSH SERPL DL<=0.05 MIU/L-ACNC: 9.3 UIU/ML (ref 0.3–5)
VLDLC SERPL CALC-MCNC: 13 MG/DL (ref 1–30)
WBC OTHER # BLD: 25.2 K/UL (ref 3.5–11)
WBC OTHER # BLD: 25.2 K/UL (ref 3.5–11)

## 2025-04-25 PROCEDURE — 80053 COMPREHEN METABOLIC PANEL: CPT

## 2025-04-25 PROCEDURE — 85025 COMPLETE CBC W/AUTO DIFF WBC: CPT

## 2025-04-25 PROCEDURE — 83615 LACTATE (LD) (LDH) ENZYME: CPT

## 2025-04-25 PROCEDURE — 80061 LIPID PANEL: CPT

## 2025-04-25 PROCEDURE — 84439 ASSAY OF FREE THYROXINE: CPT

## 2025-04-25 PROCEDURE — 36415 COLL VENOUS BLD VENIPUNCTURE: CPT

## 2025-04-25 PROCEDURE — 84443 ASSAY THYROID STIM HORMONE: CPT

## 2025-04-28 DIAGNOSIS — R10.84 GENERALIZED ABDOMINAL PAIN: Primary | ICD-10-CM

## 2025-04-29 ENCOUNTER — HOSPITAL ENCOUNTER (OUTPATIENT)
Dept: CT IMAGING | Age: 80
Discharge: HOME | End: 2025-05-01
Payer: MEDICARE

## 2025-04-29 ENCOUNTER — TELEPHONE (OUTPATIENT)
Dept: PRIMARY CARE CLINIC | Age: 80
End: 2025-04-29

## 2025-04-29 ENCOUNTER — RESULTS FOLLOW-UP (OUTPATIENT)
Dept: PRIMARY CARE CLINIC | Age: 80
End: 2025-04-29

## 2025-04-29 DIAGNOSIS — R10.84 ABDOMINAL PAIN, GENERALIZED: Primary | ICD-10-CM

## 2025-04-29 DIAGNOSIS — R10.84 ABDOMINAL PAIN, GENERALIZED: ICD-10-CM

## 2025-04-29 PROCEDURE — 2580000003 HC RX 258: Performed by: NURSE PRACTITIONER

## 2025-04-29 PROCEDURE — 2500000003 HC RX 250 WO HCPCS: Performed by: NURSE PRACTITIONER

## 2025-04-29 PROCEDURE — 74177 CT ABD & PELVIS W/CONTRAST: CPT

## 2025-04-29 PROCEDURE — 6360000004 HC RX CONTRAST MEDICATION: Performed by: NURSE PRACTITIONER

## 2025-04-29 RX ORDER — SODIUM CHLORIDE 0.9 % (FLUSH) 0.9 %
10 SYRINGE (ML) INJECTION PRN
Status: DISCONTINUED | OUTPATIENT
Start: 2025-04-29 | End: 2025-05-02 | Stop reason: HOSPADM

## 2025-04-29 RX ORDER — IOPAMIDOL 755 MG/ML
100 INJECTION, SOLUTION INTRAVASCULAR
Status: COMPLETED | OUTPATIENT
Start: 2025-04-29 | End: 2025-04-29

## 2025-04-29 RX ORDER — 0.9 % SODIUM CHLORIDE 0.9 %
80 INTRAVENOUS SOLUTION INTRAVENOUS ONCE
Status: COMPLETED | OUTPATIENT
Start: 2025-04-29 | End: 2025-04-29

## 2025-04-29 RX ADMIN — IOPAMIDOL 100 ML: 755 INJECTION, SOLUTION INTRAVENOUS at 14:10

## 2025-04-29 RX ADMIN — SODIUM CHLORIDE, PRESERVATIVE FREE 10 ML: 5 INJECTION INTRAVENOUS at 14:10

## 2025-04-29 RX ADMIN — SODIUM CHLORIDE 80 ML: 9 INJECTION, SOLUTION INTRAVENOUS at 14:10

## 2025-04-29 NOTE — TELEPHONE ENCOUNTER
Patient is scheduled to get CT of Abdomen completed today at 2pm. [facility] called for clarification on the order.    They would like to know if contrast is needed. Usually for the diagnosis reason of Generalized Abdominal Pain, they would use contrast, but they want to make sure that's okay.    Please advise.

## 2025-04-30 ENCOUNTER — OFFICE VISIT (OUTPATIENT)
Dept: PRIMARY CARE CLINIC | Age: 80
End: 2025-04-30

## 2025-04-30 ENCOUNTER — TELEPHONE (OUTPATIENT)
Dept: PRIMARY CARE CLINIC | Age: 80
End: 2025-04-30

## 2025-04-30 VITALS
WEIGHT: 154.7 LBS | HEIGHT: 62 IN | SYSTOLIC BLOOD PRESSURE: 132 MMHG | BODY MASS INDEX: 28.47 KG/M2 | DIASTOLIC BLOOD PRESSURE: 84 MMHG | HEART RATE: 74 BPM | OXYGEN SATURATION: 95 %

## 2025-04-30 DIAGNOSIS — R73.09 ELEVATED GLUCOSE: ICD-10-CM

## 2025-04-30 DIAGNOSIS — R60.0 LOCALIZED EDEMA: ICD-10-CM

## 2025-04-30 DIAGNOSIS — E03.9 HYPOTHYROIDISM, UNSPECIFIED TYPE: ICD-10-CM

## 2025-04-30 DIAGNOSIS — I87.2 VENOUS INSUFFICIENCY: Primary | ICD-10-CM

## 2025-04-30 RX ORDER — LEVOTHYROXINE SODIUM 100 UG/1
100 TABLET ORAL DAILY
Qty: 90 TABLET | Refills: 1 | Status: SHIPPED | OUTPATIENT
Start: 2025-04-30

## 2025-04-30 RX ORDER — BUMETANIDE 1 MG/1
1 TABLET ORAL DAILY
Qty: 30 TABLET | Refills: 2 | Status: SHIPPED | OUTPATIENT
Start: 2025-04-30

## 2025-04-30 ASSESSMENT — ENCOUNTER SYMPTOMS
NAUSEA: 0
BACK PAIN: 1
CONSTIPATION: 0
ABDOMINAL DISTENTION: 0
SINUS PAIN: 0
ABDOMINAL PAIN: 1
DIARRHEA: 0

## 2025-04-30 NOTE — PROGRESS NOTES
[facility]  [facility]  [address] [address] [address]  Dept: [phone]    Sandi Lima is a 79 y.o. female Established patient, who presents today for her medical conditions/complaints as noted below.      Chief Complaint   Patient presents with    Discuss Labs    Edema    Spasms       HPI:     HPI   She feels okay. She knows she is off but stable.   She tolerated the IV contrast okay.  She has increased furosemide to 40 mg daily now she is taking 20 at 7 am and 20 mg at noon.    She bought 4 different types of compression stockings. She could not stand anything tight on knees.  Pill does not bother her but she is not seeing big difference.  She has experimented with how many steps she takes in the morning.    She has been eating low calorie, high protein and balanced diet and she has been gaining weight.      She does have some discomfort in upper abdomen. She has had muscle spasms starting in RUQ and moving across the top of the abdomen along diaphragm region. She has some tenderness or stitch in RUQ.      Reviewed prior notes: None   Reviewed previous:  Labs and Imaging     Thyroid stimulating hormone is elevating. Recommend increasing levothyroxine. White blood cell count stable with neutrophils and lymphocytes similar to what they have been. Sodium is low and glucose is high. Increase sodium intake and we can do a hemoglobin A1C test tomorrow at appointment.      Some ill defined changes in bilateral ilium that are concerning due to history of CLL.  Recommend PET-CT and image guided biopsy.  Also noted small pleural effusion, Compression deformity at T10 and mild atherosclerosis .  No components found for: \"LDLCHOLESTEROL\", \"LDLCALC\"    (goal LDL is <100)   AST (U/L)   Date Value   04/25/2025 25   04/25/2025 25     ALT (U/L)   Date Value   04/25/2025 10   04/25/2025 10     BUN (mg/dL)   Date Value   04/25/2025 14   04/25/2025 14     Hemoglobin A1C (%)

## 2025-04-30 NOTE — PATIENT INSTRUCTIONS
Stop taking furosemide    Start bumetanide 1 mg daily for swelling.    Increase levothyroxine to 100 mcg daily    Repeat BMP and hemoglobin A1c blood work in 2 weeks    Repeat TSH and Free T4 in 3 months    Follow up with Dr. Braxton  to review labs and CT.

## 2025-04-30 NOTE — TELEPHONE ENCOUNTER
Glucose was a little bit elevated on last blood draw.  Hemoglobin A1c lab draw entered.  Please asked patient to get hemoglobin A 1C drawn when she gets her basic metabolic panel drawn.  She may need the paper order depending on where she gets it drawn.

## 2025-05-01 DIAGNOSIS — E78.2 MIXED HYPERLIPIDEMIA: ICD-10-CM

## 2025-05-02 RX ORDER — ROSUVASTATIN CALCIUM 5 MG/1
5 TABLET, COATED ORAL DAILY
Qty: 90 TABLET | Refills: 2 | Status: SHIPPED | OUTPATIENT
Start: 2025-05-02

## 2025-05-06 ENCOUNTER — OFFICE VISIT (OUTPATIENT)
Dept: ORTHOPEDIC SURGERY | Age: 80
End: 2025-05-06
Payer: MEDICARE

## 2025-05-06 VITALS — HEIGHT: 62 IN | RESPIRATION RATE: 14 BRPM | BODY MASS INDEX: 28.34 KG/M2 | WEIGHT: 154 LBS

## 2025-05-06 DIAGNOSIS — M16.11 ARTHRITIS OF RIGHT HIP: Primary | ICD-10-CM

## 2025-05-06 PROCEDURE — 1125F AMNT PAIN NOTED PAIN PRSNT: CPT | Performed by: ORTHOPAEDIC SURGERY

## 2025-05-06 PROCEDURE — 1090F PRES/ABSN URINE INCON ASSESS: CPT | Performed by: ORTHOPAEDIC SURGERY

## 2025-05-06 PROCEDURE — G8427 DOCREV CUR MEDS BY ELIG CLIN: HCPCS | Performed by: ORTHOPAEDIC SURGERY

## 2025-05-06 PROCEDURE — 1036F TOBACCO NON-USER: CPT | Performed by: ORTHOPAEDIC SURGERY

## 2025-05-06 PROCEDURE — 1123F ACP DISCUSS/DSCN MKR DOCD: CPT | Performed by: ORTHOPAEDIC SURGERY

## 2025-05-06 PROCEDURE — 1159F MED LIST DOCD IN RCRD: CPT | Performed by: ORTHOPAEDIC SURGERY

## 2025-05-06 PROCEDURE — 99204 OFFICE O/P NEW MOD 45 MIN: CPT | Performed by: ORTHOPAEDIC SURGERY

## 2025-05-06 PROCEDURE — G8400 PT W/DXA NO RESULTS DOC: HCPCS | Performed by: ORTHOPAEDIC SURGERY

## 2025-05-06 PROCEDURE — G8417 CALC BMI ABV UP PARAM F/U: HCPCS | Performed by: ORTHOPAEDIC SURGERY

## 2025-05-06 NOTE — PROGRESS NOTES
[facility] ORTHOPEDICS AND SPORTS MEDICINE  [address] [address] [facility]  [address] [place], [place] [id number]  Dept: [phone]     Orthopedic Surgery    New Patient and Hip Pain (N.P R side groin pain started 4/29/25, back spasms, has done 3 PT sessions for back)       05/06/25  Sandi Lima is a 79 y.o. female presenting for evaluation of right hip pain into the groin.  I reviewed x-rays of the right hip which demonstrates advanced bone-on-bone arthritis of the hip joint itself.  She has been having pain for approximately 1 week.  She is very active.  She is wondering what option would allow her to resume her active lifestyle the best and most reliably.  My recommendation would be for right hip total hip replacement.  I will refer her to Dr. Aaron Chandra for consideration of this      Review of Systems:  Joint pain  All other systems reviewed and are negative.    Past Surgical History:   Procedure Laterality Date    BREAST BIOPSY Right     COLONOSCOPY      DILATION AND CURETTAGE OF UTERUS  05/17/2022    DILATATION AND CURETTAGE HYSTEROSCOPY MYOSURE WITH EXAM UNDER ANESTHESIA AND CERVICAL BIOPSY    DILATION AND CURETTAGE OF UTERUS N/A 05/17/2022    DILATATION AND CURETTAGE HYSTEROSCOPY MYOSURE WITH EXAM UNDER ANESTHESIA AND CERVICAL BIOPSY performed by Jovany Berman DO at [facility] OR    HYSTEROSCOPY  05/17/2022    TONSILLECTOMY      TUBAL LIGATION      UPPER GASTROINTESTINAL ENDOSCOPY        Past Medical History:   Diagnosis Date    CLL (chronic lymphocytic leukemia) (HCC) 2004    Hyperlipidemia     Hypertension     Hypothyroidism     Leukocytosis     MRSA (methicillin resistant Staphylococcus aureus) infection     Thrombocytopenia         Physical Exam:  Resp 14   Ht 1.575 m (5' 2\")   Wt 69.9 kg (154 lb)   BMI 28.17 kg/m²    Constitutional: No acute distress, comfortable  Respiratory: Unlabored breathing with normal rate, no

## 2025-05-13 ENCOUNTER — RESULTS FOLLOW-UP (OUTPATIENT)
Dept: PRIMARY CARE CLINIC | Age: 80
End: 2025-05-13

## 2025-05-13 ENCOUNTER — PATIENT MESSAGE (OUTPATIENT)
Dept: PRIMARY CARE CLINIC | Age: 80
End: 2025-05-13

## 2025-05-13 ENCOUNTER — HOSPITAL ENCOUNTER (OUTPATIENT)
Age: 80
Discharge: HOME | End: 2025-05-13
Payer: MEDICARE

## 2025-05-13 DIAGNOSIS — E03.9 HYPOTHYROIDISM, UNSPECIFIED TYPE: ICD-10-CM

## 2025-05-13 DIAGNOSIS — R73.09 ELEVATED GLUCOSE: ICD-10-CM

## 2025-05-13 DIAGNOSIS — R60.0 LOCALIZED EDEMA: ICD-10-CM

## 2025-05-13 DIAGNOSIS — I87.2 VENOUS INSUFFICIENCY: ICD-10-CM

## 2025-05-13 LAB
ANION GAP SERPL CALCULATED.3IONS-SCNC: 14 MMOL/L (ref 9–16)
BUN SERPL-MCNC: 16 MG/DL (ref 8–23)
CALCIUM SERPL-MCNC: 10.1 MG/DL (ref 8.6–10.4)
CHLORIDE SERPL-SCNC: 95 MMOL/L (ref 98–107)
CO2 SERPL-SCNC: 28 MMOL/L (ref 20–31)
CREAT SERPL-MCNC: 0.9 MG/DL (ref 0.6–0.9)
EST. AVERAGE GLUCOSE BLD GHB EST-MCNC: 117 MG/DL
GFR, ESTIMATED: 64 ML/MIN/1.73M2
GLUCOSE SERPL-MCNC: 102 MG/DL (ref 74–99)
HBA1C MFR BLD: 5.7 % (ref 4–6)
POTASSIUM SERPL-SCNC: 3.7 MMOL/L (ref 3.7–5.3)
SODIUM SERPL-SCNC: 137 MMOL/L (ref 136–145)
T4 FREE SERPL-MCNC: 1.7 NG/DL (ref 0.92–1.68)
TSH SERPL DL<=0.05 MIU/L-ACNC: 13.4 UIU/ML (ref 0.27–4.2)

## 2025-05-13 PROCEDURE — 80048 BASIC METABOLIC PNL TOTAL CA: CPT

## 2025-05-13 PROCEDURE — 84439 ASSAY OF FREE THYROXINE: CPT

## 2025-05-13 PROCEDURE — 36415 COLL VENOUS BLD VENIPUNCTURE: CPT

## 2025-05-13 PROCEDURE — 84443 ASSAY THYROID STIM HORMONE: CPT

## 2025-05-13 PROCEDURE — 83036 HEMOGLOBIN GLYCOSYLATED A1C: CPT

## 2025-05-13 RX ORDER — RAMIPRIL 5 MG/1
5 CAPSULE ORAL DAILY
Qty: 90 CAPSULE | Refills: 3 | Status: SHIPPED | OUTPATIENT
Start: 2025-05-13

## 2025-05-13 RX ORDER — LEVOTHYROXINE SODIUM 112 UG/1
112 TABLET ORAL DAILY
Qty: 30 TABLET | Refills: 2 | Status: SHIPPED | OUTPATIENT
Start: 2025-05-13

## 2025-05-13 NOTE — TELEPHONE ENCOUNTER
TSH remains high.  Increase levothyroxine to 112 mcg daily.  30-day prescription sent to [facility].  Repeat TSH and free T4 in about 3 months.    Due to continued swelling.  Try taking the Bumex or bumetanide 1 mg tablet 2 times per day once in the morning and once about noon for 3 days.  Then let me know how your swelling is doing.  Increase your potassium intake if possible while taking the Bumex 2 times per day

## 2025-05-14 DIAGNOSIS — I10 ESSENTIAL HYPERTENSION: ICD-10-CM

## 2025-05-14 RX ORDER — METOPROLOL SUCCINATE 25 MG/1
12.5 TABLET, EXTENDED RELEASE ORAL DAILY
Qty: 90 TABLET | Refills: 3 | Status: SHIPPED | OUTPATIENT
Start: 2025-05-14

## 2025-05-16 ENCOUNTER — TELEMEDICINE (OUTPATIENT)
Age: 80
End: 2025-05-16
Payer: MEDICARE

## 2025-05-16 DIAGNOSIS — M89.8X5 LYTIC BONE LESION OF HIP: ICD-10-CM

## 2025-05-16 DIAGNOSIS — C91.10 CLL (CHRONIC LYMPHOCYTIC LEUKEMIA) (HCC): Primary | ICD-10-CM

## 2025-05-16 DIAGNOSIS — D80.1 HYPOGAMMAGLOBULINEMIA: ICD-10-CM

## 2025-05-16 DIAGNOSIS — R16.1 SPLENOMEGALY: ICD-10-CM

## 2025-05-16 PROCEDURE — G8400 PT W/DXA NO RESULTS DOC: HCPCS | Performed by: INTERNAL MEDICINE

## 2025-05-16 PROCEDURE — G8428 CUR MEDS NOT DOCUMENT: HCPCS | Performed by: INTERNAL MEDICINE

## 2025-05-16 PROCEDURE — 1090F PRES/ABSN URINE INCON ASSESS: CPT | Performed by: INTERNAL MEDICINE

## 2025-05-16 PROCEDURE — 99214 OFFICE O/P EST MOD 30 MIN: CPT | Performed by: INTERNAL MEDICINE

## 2025-05-16 PROCEDURE — 1123F ACP DISCUSS/DSCN MKR DOCD: CPT | Performed by: INTERNAL MEDICINE

## 2025-05-16 NOTE — PROGRESS NOTES
visible  [] Abnormal -    HENT:   [x] Normocephalic, atraumatic.  [] Abnormal   [x] Mouth/Throat: Mucous membranes are moist.     External Ears [x] Normal  [] Abnormal-     Neck: [x] No visualized mass     Pulmonary/Chest: [x] Respiratory effort normal.  [x] No visualized signs of difficulty breathing or respiratory distress        [] Abnormal-      Musculoskeletal:   [x] Normal gait with no signs of ataxia         [x] Normal range of motion of neck        [] Abnormal-       Neurological:        [x] No Facial Asymmetry (Cranial nerve 7 motor function) (limited exam to video visit)          [x] No gaze palsy        [] Abnormal-         Skin:        [x] No significant exanthematous lesions or discoloration noted on facial skin         [] Abnormal-            Psychiatric:       [x] Normal Affect [x] No Hallucinations        [] Abnormal-     Other pertinent observable physical exam findings-     Due to this being a TeleHealth encounter, evaluation of the following organ systems is limited: Vitals/Constitutional/EENT/Resp/CV/GI//MS/Neuro/Skin/Heme-Lymph-Imm.      LABS:       Results for orders placed or performed during the hospital encounter of 05/13/25   Hemoglobin A1C   Result Value Ref Range    Hemoglobin A1C 5.7 4.0 - 6.0 %    Estimated Avg Glucose 117 mg/dL   Basic Metabolic Panel   Result Value Ref Range    Sodium 137 136 - 145 mmol/L    Potassium 3.7 3.7 - 5.3 mmol/L    Chloride 95 (L) 98 - 107 mmol/L    CO2 28 20 - 31 mmol/L    Anion Gap 14 9 - 16 mmol/L    Glucose 102 (H) 74 - 99 mg/dL    BUN 16 8 - 23 mg/dL    Creatinine 0.9 0.6 - 0.9 mg/dL    Est, Glom Filt Rate 64 >60 mL/min/1.73m2    Calcium 10.1 8.6 - 10.4 mg/dL   T4, Free   Result Value Ref Range    T4 Free 1.7 (H) 0.92 - 1.68 ng/dL   TSH   Result Value Ref Range    TSH 13.40 (H) 0.27 - 4.20 uIU/mL     IMPRESSION:     No diagnosis found.  Thrombocytopenia  Hypogammaglobulinemia    Patient Active Problem List   Diagnosis    Mixed hyperlipidemia

## 2025-05-20 ENCOUNTER — OFFICE VISIT (OUTPATIENT)
Age: 80
End: 2025-05-20
Payer: MEDICARE

## 2025-05-20 VITALS — BODY MASS INDEX: 28.34 KG/M2 | WEIGHT: 154 LBS | HEIGHT: 62 IN

## 2025-05-20 DIAGNOSIS — M16.11 ARTHRITIS OF RIGHT HIP: Primary | ICD-10-CM

## 2025-05-20 PROCEDURE — G8427 DOCREV CUR MEDS BY ELIG CLIN: HCPCS | Performed by: ORTHOPAEDIC SURGERY

## 2025-05-20 PROCEDURE — 1125F AMNT PAIN NOTED PAIN PRSNT: CPT | Performed by: ORTHOPAEDIC SURGERY

## 2025-05-20 PROCEDURE — 99214 OFFICE O/P EST MOD 30 MIN: CPT | Performed by: ORTHOPAEDIC SURGERY

## 2025-05-20 PROCEDURE — 1123F ACP DISCUSS/DSCN MKR DOCD: CPT | Performed by: ORTHOPAEDIC SURGERY

## 2025-05-20 PROCEDURE — G8417 CALC BMI ABV UP PARAM F/U: HCPCS | Performed by: ORTHOPAEDIC SURGERY

## 2025-05-20 PROCEDURE — G8400 PT W/DXA NO RESULTS DOC: HCPCS | Performed by: ORTHOPAEDIC SURGERY

## 2025-05-20 PROCEDURE — 1036F TOBACCO NON-USER: CPT | Performed by: ORTHOPAEDIC SURGERY

## 2025-05-20 PROCEDURE — 1159F MED LIST DOCD IN RCRD: CPT | Performed by: ORTHOPAEDIC SURGERY

## 2025-05-20 PROCEDURE — 1090F PRES/ABSN URINE INCON ASSESS: CPT | Performed by: ORTHOPAEDIC SURGERY

## 2025-05-20 NOTE — PROGRESS NOTES
[facility] Orthopedics & Sports Medicine      [facility] ORTHOPAEDICS AND SPORTS MEDICINE  [facility]  [place] [address]  Dept: [phone]  Dept Fax: [phone]    Chief Compliant:  Chief Complaint   Patient presents with    Hip Pain     R Hip Pain        History of Present Illness:  This is a 79 y.o. female who presents to the clinic today for evaluation / follow up of right hip pain.  She has had longstanding right hip pain from arthritis.  She has dull aching pain.  She at this point she has had to use a walker because she feels unstable given the amount of discomfort and pain.  She is here today for further evaluation.  She is tried ibuprofen and Tylenol..       Physical Exam:    On examination the right hip has loss of internal rotation she has crepitus range of motion and discomfort.  She maintains good hip flexion strength but she does not require a walker for ambulation.  She has bilateral lower extremity pitting edema which she is working with her family doctor to resolve.    Nursing note and vitals reviewed.     Labs and Imaging: X-rays of the right hip show complete loss of joint space osteophytes present subchondral sclerosis severe arthritis    XR taken today:  No results found.      No orders of the defined types were placed in this encounter.      Assessment and Plan:  No diagnosis found.      This is a 79 y.o. female with right hip arthritis.  I discussed with her I would recommend right anterior total hip replacement.  We did talk about the swelling in her legs needed to be optimized prior to surgery.  I would recommend compression socks as well.  She does have a thyroid issue that she is working out also with her family doctor so she will need medical clearance.  Otherwise when she is approved we will see her back for right anterior total hip replacement.         Review of Systems   Constitutional: Negative for fever,

## 2025-05-21 ENCOUNTER — PREP FOR PROCEDURE (OUTPATIENT)
Age: 80
End: 2025-05-21

## 2025-05-21 PROBLEM — M16.11 OSTEOARTHRITIS OF RIGHT HIP: Status: ACTIVE | Noted: 2025-05-21

## 2025-05-22 ENCOUNTER — TELEPHONE (OUTPATIENT)
Dept: PRIMARY CARE CLINIC | Age: 80
End: 2025-05-22

## 2025-05-22 DIAGNOSIS — I87.2 VENOUS INSUFFICIENCY: ICD-10-CM

## 2025-05-22 DIAGNOSIS — R60.0 LOCALIZED EDEMA: ICD-10-CM

## 2025-05-22 RX ORDER — BUMETANIDE 1 MG/1
1 TABLET ORAL 2 TIMES DAILY
Qty: 60 TABLET | Refills: 2 | Status: SHIPPED | OUTPATIENT
Start: 2025-05-22

## 2025-05-29 DIAGNOSIS — R60.0 LOCALIZED EDEMA: ICD-10-CM

## 2025-05-29 DIAGNOSIS — I87.2 VENOUS INSUFFICIENCY: ICD-10-CM

## 2025-05-29 DIAGNOSIS — E03.9 HYPOTHYROIDISM, UNSPECIFIED TYPE: Primary | ICD-10-CM

## 2025-06-02 ENCOUNTER — HOSPITAL ENCOUNTER (OUTPATIENT)
Age: 80
Discharge: HOME | End: 2025-06-02
Payer: MEDICARE

## 2025-06-02 DIAGNOSIS — I87.2 VENOUS INSUFFICIENCY: ICD-10-CM

## 2025-06-02 DIAGNOSIS — E03.9 HYPOTHYROIDISM, UNSPECIFIED TYPE: ICD-10-CM

## 2025-06-02 DIAGNOSIS — R60.0 LOCALIZED EDEMA: ICD-10-CM

## 2025-06-02 LAB
ANION GAP SERPL CALCULATED.3IONS-SCNC: 14 MMOL/L (ref 9–16)
BUN SERPL-MCNC: 15 MG/DL (ref 8–23)
CALCIUM SERPL-MCNC: 9.5 MG/DL (ref 8.6–10.4)
CHLORIDE SERPL-SCNC: 92 MMOL/L (ref 98–107)
CO2 SERPL-SCNC: 28 MMOL/L (ref 20–31)
CREAT SERPL-MCNC: 0.8 MG/DL (ref 0.6–0.9)
GFR, ESTIMATED: 74 ML/MIN/1.73M2
GLUCOSE SERPL-MCNC: 110 MG/DL (ref 74–99)
POTASSIUM SERPL-SCNC: 4.4 MMOL/L (ref 3.7–5.3)
SODIUM SERPL-SCNC: 134 MMOL/L (ref 136–145)
TSH SERPL DL<=0.05 MIU/L-ACNC: 5.55 UIU/ML (ref 0.27–4.2)

## 2025-06-02 PROCEDURE — 36415 COLL VENOUS BLD VENIPUNCTURE: CPT

## 2025-06-02 PROCEDURE — 84439 ASSAY OF FREE THYROXINE: CPT

## 2025-06-02 PROCEDURE — 80048 BASIC METABOLIC PNL TOTAL CA: CPT

## 2025-06-02 PROCEDURE — 84443 ASSAY THYROID STIM HORMONE: CPT

## 2025-06-03 ENCOUNTER — RESULTS FOLLOW-UP (OUTPATIENT)
Dept: PRIMARY CARE CLINIC | Age: 80
End: 2025-06-03

## 2025-06-03 LAB — T4 FREE SERPL-MCNC: 1.9 NG/DL (ref 0.92–1.68)

## 2025-06-04 ENCOUNTER — OFFICE VISIT (OUTPATIENT)
Dept: PRIMARY CARE CLINIC | Age: 80
End: 2025-06-04

## 2025-06-04 VITALS
DIASTOLIC BLOOD PRESSURE: 72 MMHG | WEIGHT: 154 LBS | HEART RATE: 68 BPM | BODY MASS INDEX: 28.34 KG/M2 | SYSTOLIC BLOOD PRESSURE: 110 MMHG | HEIGHT: 62 IN | OXYGEN SATURATION: 96 %

## 2025-06-04 DIAGNOSIS — I49.3 PREMATURE VENTRICULAR COMPLEX: ICD-10-CM

## 2025-06-04 DIAGNOSIS — E03.9 HYPOTHYROIDISM, UNSPECIFIED TYPE: Primary | ICD-10-CM

## 2025-06-04 DIAGNOSIS — C91.10 CLL (CHRONIC LYMPHOCYTIC LEUKEMIA) (HCC): ICD-10-CM

## 2025-06-04 ASSESSMENT — ENCOUNTER SYMPTOMS
CONSTIPATION: 0
DIARRHEA: 0
BACK PAIN: 1

## 2025-06-04 NOTE — PATIENT INSTRUCTIONS
May use  ibuprofen for a few days    Continue bumetanide 1 mg 2x/day    Continue increase salt intake

## 2025-06-04 NOTE — PROGRESS NOTES
[facility]  [facility] PRIMARY CARE  [address] [facility] [address]  Dept: [phone]    Sandi Lima is a 79 y.o. female Established patient, who presents today for her medical conditions/complaints as noted below.      Chief Complaint   Patient presents with    Edema     Bilateral Leg Swelling    Hip Pain    Discuss Procedure     Patient would like to discuss upcoming procedure and pet scan       HPI:     History of Present Illness  The patient presents for evaluation of lower back pain, sodium and potassium management, glucose management, thyroid management, premature ventricular complexes, blood pressure management, and health maintenance.    She is scheduled for a PET scan tomorrow, which she has been postponing due to a previous painful experience during a CT scan in 04/2025. Currently using a walker, she is awaiting hip surgery. Pain has been managed with Tylenol, which she must discontinue 5 days prior to surgery. Considering Advil for pain management, she performs home exercises and physical therapy but experiences difficulty in lifting her legs. Lower back pain is suspected to be related to her hip condition. Diagnosed with arthritis, she has been managing pain with Tylenol and performing pelvic tilts and attempting to straighten her spine before sleep, which has improved sleep quality. She also takes one or two daytime rests. Mobility is aided by a cane, though a walker is found more beneficial. Compression hose, recommended by her orthopedic surgeon, have improved leg swelling, allowing her to tie shoes tightly. Leg color has improved from a previous red hue. Plans to order new compression hose with a regular calf size are in place. Weight has decreased from 150 to between 143 and 144 pounds. Maintaining a balanced diet, she reports regular bowel movements and no abdominal tenderness. Bumex is taken twice daily.    Efforts to increase sodium intake through diet

## 2025-06-05 ENCOUNTER — HOSPITAL ENCOUNTER (OUTPATIENT)
Dept: NUCLEAR MEDICINE | Age: 80
Discharge: HOME | End: 2025-06-07
Attending: INTERNAL MEDICINE
Payer: MEDICARE

## 2025-06-05 ENCOUNTER — TELEPHONE (OUTPATIENT)
Age: 80
End: 2025-06-05

## 2025-06-05 DIAGNOSIS — D80.1 HYPOGAMMAGLOBULINEMIA: ICD-10-CM

## 2025-06-05 DIAGNOSIS — Z01.818 PRE-OP EXAM: Primary | ICD-10-CM

## 2025-06-05 DIAGNOSIS — M89.8X5 LYTIC BONE LESION OF HIP: ICD-10-CM

## 2025-06-05 DIAGNOSIS — R16.1 SPLENOMEGALY: ICD-10-CM

## 2025-06-05 DIAGNOSIS — C91.10 CLL (CHRONIC LYMPHOCYTIC LEUKEMIA) (HCC): ICD-10-CM

## 2025-06-05 LAB — GLUCOSE BLD-MCNC: 110 MG/DL (ref 65–105)

## 2025-06-05 PROCEDURE — 82947 ASSAY GLUCOSE BLOOD QUANT: CPT

## 2025-06-05 PROCEDURE — 2500000003 HC RX 250 WO HCPCS: Performed by: INTERNAL MEDICINE

## 2025-06-05 PROCEDURE — 78815 PET IMAGE W/CT SKULL-THIGH: CPT

## 2025-06-05 PROCEDURE — 3430000000 HC RX DIAGNOSTIC RADIOPHARMACEUTICAL: Performed by: INTERNAL MEDICINE

## 2025-06-05 PROCEDURE — A9609 HC RX DIAGNOSTIC RADIOPHARMACEUTICAL: HCPCS | Performed by: INTERNAL MEDICINE

## 2025-06-05 RX ORDER — FLUDEOXYGLUCOSE F 18 200 MCI/ML
10 INJECTION, SOLUTION INTRAVENOUS
Status: COMPLETED | OUTPATIENT
Start: 2025-06-05 | End: 2025-06-05

## 2025-06-05 RX ORDER — SODIUM CHLORIDE 0.9 % (FLUSH) 0.9 %
10 SYRINGE (ML) INJECTION PRN
Status: DISCONTINUED | OUTPATIENT
Start: 2025-06-05 | End: 2025-06-08 | Stop reason: HOSPADM

## 2025-06-05 RX ORDER — FLUDEOXYGLUCOSE F 18 200 MCI/ML
10 INJECTION, SOLUTION INTRAVENOUS
Status: DISCONTINUED | OUTPATIENT
Start: 2025-06-05 | End: 2025-06-05

## 2025-06-05 RX ADMIN — FLUDEOXYGLUCOSE F 18 11.42 MILLICURIE: 200 INJECTION, SOLUTION INTRAVENOUS at 14:40

## 2025-06-05 RX ADMIN — SODIUM CHLORIDE, PRESERVATIVE FREE 10 ML: 5 INJECTION INTRAVENOUS at 14:41

## 2025-06-05 NOTE — TELEPHONE ENCOUNTER
Patient has been scheduled for sx on 6/23. Instructions were given at the time of booking.  I called today and left a message confirming date/time/location of sx and PAT.  I also scheduled the post op appointment.  He/she may call back with questions or to change the appointment.  She called back and left me a message confirming all of this.  She has a walker, she lives at [facility].

## 2025-06-09 ENCOUNTER — TELEPHONE (OUTPATIENT)
Age: 80
End: 2025-06-09

## 2025-06-09 NOTE — TELEPHONE ENCOUNTER
Syl called again today and explained she needs to cancel surgery for 6/23 until further notice because she was diagnosed with chronic lymphocytic leukemia.    She can call back anytime to reschedule.

## 2025-06-11 DIAGNOSIS — C91.10 CLL (CHRONIC LYMPHOCYTIC LEUKEMIA) (HCC): Primary | ICD-10-CM

## 2025-06-11 DIAGNOSIS — M89.9 BONE LESION: ICD-10-CM

## 2025-06-23 ENCOUNTER — HOSPITAL ENCOUNTER (OUTPATIENT)
Dept: CT IMAGING | Age: 80
Discharge: HOME | End: 2025-06-25

## 2025-06-23 VITALS
OXYGEN SATURATION: 94 % | TEMPERATURE: 99.5 F | SYSTOLIC BLOOD PRESSURE: 122 MMHG | BODY MASS INDEX: 26.04 KG/M2 | WEIGHT: 141.5 LBS | DIASTOLIC BLOOD PRESSURE: 62 MMHG | RESPIRATION RATE: 16 BRPM | HEIGHT: 62 IN | HEART RATE: 93 BPM

## 2025-06-23 DIAGNOSIS — M89.9 BONE LESION: ICD-10-CM

## 2025-06-23 DIAGNOSIS — C91.10 CLL (CHRONIC LYMPHOCYTIC LEUKEMIA) (HCC): ICD-10-CM

## 2025-06-23 LAB
INR PPP: 1
PARTIAL THROMBOPLASTIN TIME: 30.4 SEC (ref 24–36)
PLATELET # BLD AUTO: 127 K/UL (ref 150–450)
PROTHROMBIN TIME: 14.5 SEC (ref 11.8–14.6)

## 2025-06-23 PROCEDURE — 7100000011 HC PHASE II RECOVERY - ADDTL 15 MIN: Performed by: RADIOLOGY

## 2025-06-23 PROCEDURE — 20220 BONE BIOPSY TROCAR/NDL SUPFC: CPT

## 2025-06-23 PROCEDURE — 85049 AUTOMATED PLATELET COUNT: CPT

## 2025-06-23 PROCEDURE — 6360000002 HC RX W HCPCS: Performed by: RADIOLOGY

## 2025-06-23 PROCEDURE — 85610 PROTHROMBIN TIME: CPT

## 2025-06-23 PROCEDURE — 36415 COLL VENOUS BLD VENIPUNCTURE: CPT

## 2025-06-23 PROCEDURE — 85730 THROMBOPLASTIN TIME PARTIAL: CPT

## 2025-06-23 PROCEDURE — 7100000010 HC PHASE II RECOVERY - FIRST 15 MIN: Performed by: RADIOLOGY

## 2025-06-23 RX ORDER — SODIUM CHLORIDE 9 MG/ML
INJECTION, SOLUTION INTRAVENOUS PRN
Status: DISCONTINUED | OUTPATIENT
Start: 2025-06-23 | End: 2025-06-26 | Stop reason: HOSPADM

## 2025-06-23 RX ORDER — SODIUM CHLORIDE 0.9 % (FLUSH) 0.9 %
5-40 SYRINGE (ML) INJECTION PRN
Status: DISCONTINUED | OUTPATIENT
Start: 2025-06-23 | End: 2025-06-26 | Stop reason: HOSPADM

## 2025-06-23 RX ORDER — FENTANYL CITRATE 50 UG/ML
INJECTION, SOLUTION INTRAMUSCULAR; INTRAVENOUS PRN
Status: COMPLETED | OUTPATIENT
Start: 2025-06-23 | End: 2025-06-23

## 2025-06-23 RX ORDER — SODIUM CHLORIDE 0.9 % (FLUSH) 0.9 %
5-40 SYRINGE (ML) INJECTION EVERY 12 HOURS SCHEDULED
Status: DISCONTINUED | OUTPATIENT
Start: 2025-06-23 | End: 2025-06-26 | Stop reason: HOSPADM

## 2025-06-23 RX ORDER — SODIUM CHLORIDE 9 MG/ML
INJECTION, SOLUTION INTRAVENOUS CONTINUOUS
Status: DISCONTINUED | OUTPATIENT
Start: 2025-06-23 | End: 2025-06-26 | Stop reason: HOSPADM

## 2025-06-23 RX ORDER — MIDAZOLAM HYDROCHLORIDE 2 MG/2ML
INJECTION, SOLUTION INTRAMUSCULAR; INTRAVENOUS PRN
Status: COMPLETED | OUTPATIENT
Start: 2025-06-23 | End: 2025-06-23

## 2025-06-23 RX ADMIN — FENTANYL CITRATE 50 MCG: 50 INJECTION, SOLUTION INTRAMUSCULAR; INTRAVENOUS at 13:46

## 2025-06-23 RX ADMIN — MIDAZOLAM HYDROCHLORIDE 0.5 MG: 1 INJECTION, SOLUTION INTRAMUSCULAR; INTRAVENOUS at 13:48

## 2025-06-23 ASSESSMENT — PAIN - FUNCTIONAL ASSESSMENT: PAIN_FUNCTIONAL_ASSESSMENT: NONE - DENIES PAIN

## 2025-06-23 ASSESSMENT — ENCOUNTER SYMPTOMS
APNEA: 0
SHORTNESS OF BREATH: 0
TROUBLE SWALLOWING: 0
GASTROINTESTINAL NEGATIVE: 1
SORE THROAT: 0
COUGH: 0
ABDOMINAL PAIN: 0
BACK PAIN: 1

## 2025-06-23 NOTE — DISCHARGE INSTRUCTIONS
RADIOLOGY POST BIOPSY INSTRUCTIONS    If you had IV Sedation:  No alcoholic beverages, no driving, operating machinery, or making legal or important decisions for 24 hours.    Diet:    May resume your usual diet.    Medications:  Use over the counter pain medications as directed on the bottle for pain control.  You may continue your home medications as instructed by the radiologist.    Post-Procedure Activity:    Avoid exercises that use your belly muscles and strenuous activities, such as bicycle riding, jogging, weight lifting, or aerobic exercise, for 1 week or until your doctor says it is okay.  Avoid other heavy work or sports for 2 days.    Limit activities for 24 hours.  Resume normal diet.  You will probably be able to shower the same day as the test, if your doctor says it is okay. Pat the puncture site dry. Do not take a bath for at least 2 days after the test, or until your doctor tells you it is okay.    Puncture Site:  Keep clean and dry for 24-48 hours.    Call Doctor if  Swelling occurs around puncture site.  Bleeding occurs at puncture site.  Shortness of breath occurs.  Extreme pain occurs.      IF YOU CANNOT REACH THE DOCTOR, GO TO THE NEAREST EMERGENCY FACILITY.     Phone:  Interventional Radiology  [phone]  After hours Radiology  [phone]    Procedure performed by Dr Conklin

## 2025-06-23 NOTE — PRE SEDATION
Sedation Pre-Procedure Note    Patient Name: Sandi Lima   YOB: 1945  Room/Bed: Room/bed info not found  Medical Record Number: [id number]  Date: 6/23/2025   Time: 1:18 PM       Indication:  Bone lesions    Consent: I have discussed with the patient and/or the patient representative the indication, alternatives, and the possible risks and/or complications of the planned procedure and the anesthesia methods. The patient and/or patient representative appear to understand and agree to proceed.    Vital Signs:   Vitals:    06/23/25 1156   BP: (!) 161/83   Pulse: (!) 104   Resp: 18   Temp: 98.1 °F (36.7 °C)   SpO2: 98%       Past Medical History:   has a past medical history of CLL (chronic lymphocytic leukemia) (HCC), Hyperlipidemia, Hypertension, Hypothyroidism, Leukocytosis, MRSA (methicillin resistant Staphylococcus aureus) infection, and Thrombocytopenia.    Past Surgical History:   has a past surgical history that includes Breast biopsy (Right); Tubal ligation; Colonoscopy; Dilation and curettage of uterus (05/17/2022); hysteroscopy (05/17/2022); Dilation and curettage of uterus (N/A, 05/17/2022); Tonsillectomy; and Upper gastrointestinal endoscopy.    Medications:   Scheduled Meds:    sodium chloride flush  5-40 mL IntraVENous 2 times per day     Continuous Infusions:    sodium chloride      sodium chloride       PRN Meds: sodium chloride flush, sodium chloride  Home Meds:   Prior to Admission medications    Medication Sig Start Date End Date Taking? Authorizing Provider   bumetanide (BUMEX) 1 MG tablet Take 1 tablet by mouth 2 times daily 5/22/25  Yes Blane Nolan APRN - CNP   metoprolol succinate (TOPROL XL) 25 MG extended release tablet Take 0.5 tablets by mouth daily 5/14/25  Yes Blane Nolan APRN - CNP   ramipril (ALTACE) 5 MG capsule Take 1 capsule by mouth daily 5/13/25  Yes Blane Nolan APRN - CNP   levothyroxine (SYNTHROID) 112 MCG tablet Take 1 tablet by mouth Daily 5/13/25

## 2025-06-23 NOTE — H&P
HISTORY and PHYSICAL  [facility]       NAME:  Sandi Lima  MRN: [id number]   YOB: 1945   Date: 6/23/2025   Age: 80 y.o.  Gender: female       COMPLAINT AND PRESENT HISTORY:     Sandi Lima is 80 y.o.,  female, presents for IR BIOPSY WITH CT  Primary dx: CLL (Chronic lymphocytic leukemia) and Bone lesion    Telehealth visit per Dr Braxton on 5/16/2025  BRIEF CASE HISTORY:   Sandi Lima is a very pleasant 74 y.o. female who is presenting for follow-up for CLL.  She was referred to me in 02/2020 for management of CLL/SLL which was diagnosed around September 2004.  She had been living in [place], [place] for quite some time. She moved to [facility] in early 2019.  She had MRSA infection in 2016.      INTERIM HISTORY:  Patient seen in clinic via virtual visit patient presents to the clinic for a follow-up visit and to discuss also his lab workup and other relevant clinical data.  Denies new complaints or interval events.  Denies swollen glands weight loss drenching night sweats.    UPDATE 6/23/2025    Sandi Lima is 80 y.o.,  female, here for IR BIOPSY WITH CT  Primary dx: CLL (Chronic lymphocytic leukemia).    The symptoms started 10 years   Pt C/O of right hip pain.  Per patient surgery was postponed d/t results of PET scan.  No recent falls or trauma. No redness, swelling or rashes.    Denies recent fever/chills.  Denies recent chest pain or SOB    Review of additional significant medical hx:   (See chart for additional detail, including current medications /see ROS for current S/S):     NPO status: NPO since MN  Anticoagulation status: ibuprofen last dose 2 weeks ago    Denies personal hx of blood clots.  Personal hx of MRSA infection.  Denies any personal or family hx of previous complications w/anesthesia.    RECENT IMAGING R/T HPI   PET CT SKULL BASE TO MID THIGH  Result Date: 6/8/2025  1.  Scattered areas of abnormally increased metabolic activity

## 2025-06-30 DIAGNOSIS — R60.0 LOCALIZED EDEMA: ICD-10-CM

## 2025-06-30 DIAGNOSIS — I87.2 VENOUS INSUFFICIENCY: ICD-10-CM

## 2025-06-30 LAB — SURGICAL PATHOLOGY REPORT: NORMAL

## 2025-06-30 RX ORDER — BUMETANIDE 1 MG/1
1 TABLET ORAL DAILY
Qty: 30 TABLET | Refills: 2
Start: 2025-06-30

## 2025-06-30 NOTE — PROGRESS NOTES
Decrease the bumetanide 1 mg tablet to 1 time per day.  If that will control the swelling along with the compression stockings then great.  If it does not you can always bump it back up to 2.

## 2025-07-09 ENCOUNTER — OFFICE VISIT (OUTPATIENT)
Age: 80
End: 2025-07-09
Payer: MEDICARE

## 2025-07-09 ENCOUNTER — TELEPHONE (OUTPATIENT)
Age: 80
End: 2025-07-09

## 2025-07-09 VITALS
RESPIRATION RATE: 18 BRPM | TEMPERATURE: 98.1 F | DIASTOLIC BLOOD PRESSURE: 75 MMHG | HEART RATE: 78 BPM | OXYGEN SATURATION: 98 % | SYSTOLIC BLOOD PRESSURE: 145 MMHG | BODY MASS INDEX: 25.11 KG/M2 | WEIGHT: 137.3 LBS

## 2025-07-09 DIAGNOSIS — M89.8X5 LYTIC BONE LESION OF HIP: ICD-10-CM

## 2025-07-09 DIAGNOSIS — C91.10 CLL (CHRONIC LYMPHOCYTIC LEUKEMIA) (HCC): Primary | ICD-10-CM

## 2025-07-09 PROCEDURE — G8427 DOCREV CUR MEDS BY ELIG CLIN: HCPCS | Performed by: INTERNAL MEDICINE

## 2025-07-09 PROCEDURE — G8400 PT W/DXA NO RESULTS DOC: HCPCS | Performed by: INTERNAL MEDICINE

## 2025-07-09 PROCEDURE — 1159F MED LIST DOCD IN RCRD: CPT | Performed by: INTERNAL MEDICINE

## 2025-07-09 PROCEDURE — 1126F AMNT PAIN NOTED NONE PRSNT: CPT | Performed by: INTERNAL MEDICINE

## 2025-07-09 PROCEDURE — 1090F PRES/ABSN URINE INCON ASSESS: CPT | Performed by: INTERNAL MEDICINE

## 2025-07-09 PROCEDURE — 99214 OFFICE O/P EST MOD 30 MIN: CPT | Performed by: INTERNAL MEDICINE

## 2025-07-09 PROCEDURE — 3078F DIAST BP <80 MM HG: CPT | Performed by: INTERNAL MEDICINE

## 2025-07-09 PROCEDURE — 1123F ACP DISCUSS/DSCN MKR DOCD: CPT | Performed by: INTERNAL MEDICINE

## 2025-07-09 PROCEDURE — 3077F SYST BP >= 140 MM HG: CPT | Performed by: INTERNAL MEDICINE

## 2025-07-09 PROCEDURE — 99215 OFFICE O/P EST HI 40 MIN: CPT | Performed by: INTERNAL MEDICINE

## 2025-07-09 PROCEDURE — 1036F TOBACCO NON-USER: CPT | Performed by: INTERNAL MEDICINE

## 2025-07-09 PROCEDURE — G8417 CALC BMI ABV UP PARAM F/U: HCPCS | Performed by: INTERNAL MEDICINE

## 2025-07-09 NOTE — TELEPHONE ENCOUNTER
Oncology pharmacy consult   Brukinsa prescription written   Rv in 5 weeks with labs         Patient is scheduled on 8/13 to return to see Dr. Braxton to go over her labs. Patient will be scheduled for oral teach, let her know pharmacist should be calling her tomorrow. Gave patient AVS and lab orders.

## 2025-07-09 NOTE — PROGRESS NOTES
however, orthopedic evaluation confirmed the absence of a fracture.    FAMILY HISTORY  Father had arthritis and CLL.    During this visit patient's allergy, social, medical, surgical history and medications were reviewed and updated.    PAST MEDICAL HISTORY:      Diagnosis Date    CLL (chronic lymphocytic leukemia) (HCC) 2004    Hyperlipidemia     Hypertension     Hypothyroidism     Leukocytosis     MRSA (methicillin resistant Staphylococcus aureus) infection     Thrombocytopenia        PAST SURGICAL HISTORY:      Procedure Laterality Date    BREAST BIOPSY Right     COLONOSCOPY      CT BIOPSY SUPERFICIAL BONE PERCUTANEOUS  6/23/2025    CT BIOPSY SUPERFICIAL BONE PERCUTANEOUS 6/23/2025 [facility] CT SCAN    DILATION AND CURETTAGE OF UTERUS  05/17/2022    DILATATION AND CURETTAGE HYSTEROSCOPY MYOSURE WITH EXAM UNDER ANESTHESIA AND CERVICAL BIOPSY    DILATION AND CURETTAGE OF UTERUS N/A 05/17/2022    DILATATION AND CURETTAGE HYSTEROSCOPY MYOSURE WITH EXAM UNDER ANESTHESIA AND CERVICAL BIOPSY performed by Jovany Berman DO at [facility]    HYSTEROSCOPY  05/17/2022    TONSILLECTOMY      TUBAL LIGATION      UPPER GASTROINTESTINAL ENDOSCOPY         CURRENT MEDICATIONS:   Current Outpatient Medications   Medication Sig Dispense Refill    bumetanide (BUMEX) 1 MG tablet Take 1 tablet by mouth daily 30 tablet 2    metoprolol succinate (TOPROL XL) 25 MG extended release tablet Take 0.5 tablets by mouth daily 90 tablet 3    ramipril (ALTACE) 5 MG capsule Take 1 capsule by mouth daily 90 capsule 3    levothyroxine (SYNTHROID) 112 MCG tablet Take 1 tablet by mouth Daily 30 tablet 2    rosuvastatin (CRESTOR) 5 MG tablet Take 1 tablet by mouth daily 90 tablet 2    Compression Stockings MISC by Does not apply route 1 each 0    acetaminophen (TYLENOL) 500 MG tablet Take 2 tablets by mouth every 8 hours as needed for Pain 180 tablet 0     No current facility-administered medications for this visit.         Vitals:    07/09/25 0820

## 2025-07-23 ENCOUNTER — TELEPHONE (OUTPATIENT)
Dept: INFUSION THERAPY | Facility: MEDICAL CENTER | Age: 80
End: 2025-07-23

## 2025-07-23 NOTE — TELEPHONE ENCOUNTER
Patient called to clarify dose of Brukinsa.  Dr. Braxton said that she should take 160 mg BID.  Patient updated via  with callback number.

## 2025-07-29 ENCOUNTER — TELEPHONE (OUTPATIENT)
Age: 80
End: 2025-07-29

## 2025-07-29 NOTE — TELEPHONE ENCOUNTER
[facility] Note  Called patient re: oral chemo - Brukinsa  LM for patient to return call.    Had originally reached out to patient 7/11, she had a lot going on because her  was in the hospital. Then I had been notified by specialty pharmacy liaison that patient had planned to wait to start the medication until she had a dental cleaning last week.    Still need to complete oral chemo teach, await callback.    Melia Patterson, PharmD  [facility]  7/29/2025 1:14 PM

## 2025-08-04 DIAGNOSIS — E03.9 HYPOTHYROIDISM, UNSPECIFIED TYPE: ICD-10-CM

## 2025-08-04 RX ORDER — LEVOTHYROXINE SODIUM 112 UG/1
112 TABLET ORAL DAILY
Qty: 30 TABLET | Refills: 2 | Status: SHIPPED | OUTPATIENT
Start: 2025-08-04

## 2025-08-20 ENCOUNTER — HOSPITAL ENCOUNTER (OUTPATIENT)
Age: 80
Discharge: HOME | End: 2025-08-20
Payer: MEDICARE

## 2025-08-20 DIAGNOSIS — M89.8X5 LYTIC BONE LESION OF HIP: ICD-10-CM

## 2025-08-20 DIAGNOSIS — C91.10 CLL (CHRONIC LYMPHOCYTIC LEUKEMIA) (HCC): ICD-10-CM

## 2025-08-20 LAB
ALBUMIN SERPL-MCNC: 4.5 G/DL (ref 3.5–5.2)
ALBUMIN/GLOB SERPL: 2 {RATIO} (ref 1–2.5)
ALP SERPL-CCNC: 147 U/L (ref 35–104)
ALT SERPL-CCNC: 8 U/L (ref 10–35)
ANION GAP SERPL CALCULATED.3IONS-SCNC: 11 MMOL/L (ref 9–16)
AST SERPL-CCNC: 27 U/L (ref 10–35)
BASOPHILS # BLD: 0 K/UL (ref 0–0.2)
BASOPHILS NFR BLD: 0 % (ref 0–2)
BILIRUB SERPL-MCNC: 0.7 MG/DL (ref 0–1.2)
BUN SERPL-MCNC: 26 MG/DL (ref 8–23)
CALCIUM SERPL-MCNC: 9.5 MG/DL (ref 8.6–10.4)
CHLORIDE SERPL-SCNC: 96 MMOL/L (ref 98–107)
CO2 SERPL-SCNC: 27 MMOL/L (ref 20–31)
CREAT SERPL-MCNC: 1.1 MG/DL (ref 0.6–0.9)
EOSINOPHIL # BLD: 0 K/UL (ref 0–0.4)
EOSINOPHILS RELATIVE PERCENT: 0 % (ref 1–4)
ERYTHROCYTE [DISTWIDTH] IN BLOOD BY AUTOMATED COUNT: 16.4 % (ref 12.5–15.4)
GFR, ESTIMATED: 50 ML/MIN/1.73M2
GLUCOSE SERPL-MCNC: 145 MG/DL (ref 74–99)
HCT VFR BLD AUTO: 35.1 % (ref 36–46)
HGB BLD-MCNC: 11.1 G/DL (ref 12–16)
LDH SERPL-CCNC: 192 U/L (ref 135–214)
LYMPHOCYTES NFR BLD: 33.02 K/UL (ref 1–4.8)
LYMPHOCYTES RELATIVE PERCENT: 89 % (ref 24–44)
MCH RBC QN AUTO: 30.5 PG (ref 26–34)
MCHC RBC AUTO-ENTMCNC: 31.6 G/DL (ref 31–37)
MCV RBC AUTO: 96.6 FL (ref 80–100)
MONOCYTES NFR BLD: 0 % (ref 1–7)
MONOCYTES NFR BLD: 0 K/UL (ref 0.1–0.8)
MORPHOLOGY: ABNORMAL
MORPHOLOGY: ABNORMAL
NEUTROPHILS NFR BLD: 11 % (ref 36–66)
NEUTS SEG NFR BLD: 4.08 K/UL (ref 1.8–7.7)
PLATELET # BLD AUTO: 164 K/UL (ref 140–450)
PMV BLD AUTO: 8.1 FL (ref 6–12)
POTASSIUM SERPL-SCNC: 4.3 MMOL/L (ref 3.7–5.3)
PROT SERPL-MCNC: 6.8 G/DL (ref 6.6–8.7)
RBC # BLD AUTO: 3.64 M/UL (ref 4–5.2)
SODIUM SERPL-SCNC: 134 MMOL/L (ref 136–145)
WBC OTHER # BLD: 37.1 K/UL (ref 3.5–11)

## 2025-08-20 PROCEDURE — 83615 LACTATE (LD) (LDH) ENZYME: CPT

## 2025-08-20 PROCEDURE — 85025 COMPLETE CBC W/AUTO DIFF WBC: CPT

## 2025-08-20 PROCEDURE — 80053 COMPREHEN METABOLIC PANEL: CPT

## 2025-08-20 PROCEDURE — 36415 COLL VENOUS BLD VENIPUNCTURE: CPT

## 2025-08-21 ENCOUNTER — TELEPHONE (OUTPATIENT)
Dept: INFUSION THERAPY | Age: 80
End: 2025-08-21

## 2025-08-27 ENCOUNTER — OFFICE VISIT (OUTPATIENT)
Age: 80
End: 2025-08-27
Payer: MEDICARE

## 2025-08-27 VITALS
RESPIRATION RATE: 15 BRPM | BODY MASS INDEX: 24.73 KG/M2 | OXYGEN SATURATION: 100 % | WEIGHT: 135.2 LBS | TEMPERATURE: 97.3 F | HEART RATE: 64 BPM | SYSTOLIC BLOOD PRESSURE: 125 MMHG | DIASTOLIC BLOOD PRESSURE: 73 MMHG

## 2025-08-27 DIAGNOSIS — M89.8X9 LYTIC LESION OF BONE ON X-RAY: ICD-10-CM

## 2025-08-27 DIAGNOSIS — C91.10 CLL (CHRONIC LYMPHOCYTIC LEUKEMIA) (HCC): ICD-10-CM

## 2025-08-27 DIAGNOSIS — C91.10 CLL (CHRONIC LYMPHOCYTIC LEUKEMIA) (HCC): Primary | ICD-10-CM

## 2025-08-27 PROCEDURE — 99213 OFFICE O/P EST LOW 20 MIN: CPT | Performed by: INTERNAL MEDICINE

## 2025-08-27 PROCEDURE — 99211 OFF/OP EST MAY X REQ PHY/QHP: CPT | Performed by: INTERNAL MEDICINE

## 2025-08-27 RX ORDER — ACETAMINOPHEN 325 MG/1
650 TABLET ORAL
OUTPATIENT
Start: 2025-08-27

## 2025-08-27 RX ORDER — ONDANSETRON 2 MG/ML
8 INJECTION INTRAMUSCULAR; INTRAVENOUS
OUTPATIENT
Start: 2025-08-27

## 2025-08-27 RX ORDER — DIPHENHYDRAMINE HYDROCHLORIDE 50 MG/ML
50 INJECTION, SOLUTION INTRAMUSCULAR; INTRAVENOUS
OUTPATIENT
Start: 2025-08-27

## 2025-08-27 RX ORDER — EPINEPHRINE 1 MG/ML
0.3 INJECTION, SOLUTION, CONCENTRATE INTRAVENOUS PRN
OUTPATIENT
Start: 2025-08-27

## 2025-08-27 RX ORDER — ALBUTEROL SULFATE 90 UG/1
4 INHALANT RESPIRATORY (INHALATION) PRN
OUTPATIENT
Start: 2025-08-27

## 2025-08-27 RX ORDER — MEPERIDINE HYDROCHLORIDE 50 MG/ML
12.5 INJECTION INTRAMUSCULAR; INTRAVENOUS; SUBCUTANEOUS PRN
OUTPATIENT
Start: 2025-08-27

## 2025-08-27 RX ORDER — HYDROCORTISONE SODIUM SUCCINATE 100 MG/2ML
100 INJECTION INTRAMUSCULAR; INTRAVENOUS
OUTPATIENT
Start: 2025-08-27

## 2025-08-27 RX ORDER — FAMOTIDINE 10 MG/ML
20 INJECTION, SOLUTION INTRAVENOUS
OUTPATIENT
Start: 2025-08-27

## 2025-08-27 RX ORDER — SODIUM CHLORIDE 9 MG/ML
INJECTION, SOLUTION INTRAVENOUS PRN
OUTPATIENT
Start: 2025-08-27

## 2025-08-29 ENCOUNTER — TELEPHONE (OUTPATIENT)
Age: 80
End: 2025-08-29

## (undated) DEVICE — SET ENDOSCP SEAL HYSTEROSCOPE RIG OUTFLO CHN DISP MYOSURE

## (undated) DEVICE — SOLUTION SCRB 4OZ 10% POVIDONE IOD ANTIMIC BTL

## (undated) DEVICE — MHPB GYN MINOR PACK: Brand: MEDLINE INDUSTRIES, INC.

## (undated) DEVICE — DRAPE,UNDRBUT,WHT GRAD PCH,CAPPORT,20/CS: Brand: MEDLINE

## (undated) DEVICE — DRAPE,REIN 53X77,STERILE: Brand: MEDLINE

## (undated) DEVICE — SOLUTION IV IRRIG POUR BRL 0.9% SODIUM CHL 2F7124

## (undated) DEVICE — SOLUTION PREP POVIDONE IOD FOR SKIN MUCOUS MEM PRIOR TO

## (undated) DEVICE — GLOVE ORANGE PI 7   MSG9070

## (undated) DEVICE — STRAP RESTRAIN W3.5XL19IN TECLIN STRRP POS LEG DURING LITH

## (undated) DEVICE — SINGLE PORT MANIFOLD: Brand: NEPTUNE 2